# Patient Record
Sex: FEMALE | Race: WHITE | NOT HISPANIC OR LATINO | Employment: OTHER | URBAN - METROPOLITAN AREA
[De-identification: names, ages, dates, MRNs, and addresses within clinical notes are randomized per-mention and may not be internally consistent; named-entity substitution may affect disease eponyms.]

---

## 2020-10-05 ENCOUNTER — OFFICE VISIT (OUTPATIENT)
Dept: PRIMARY CARE CLINIC | Facility: CLINIC | Age: 55
End: 2020-10-05
Payer: COMMERCIAL

## 2020-10-05 VITALS
HEART RATE: 102 BPM | OXYGEN SATURATION: 99 % | RESPIRATION RATE: 17 BRPM | SYSTOLIC BLOOD PRESSURE: 110 MMHG | BODY MASS INDEX: 19.91 KG/M2 | HEIGHT: 64 IN | TEMPERATURE: 98 F | WEIGHT: 116.63 LBS | DIASTOLIC BLOOD PRESSURE: 60 MMHG

## 2020-10-05 DIAGNOSIS — F90.9 ATTENTION DEFICIT HYPERACTIVITY DISORDER (ADHD), UNSPECIFIED ADHD TYPE: ICD-10-CM

## 2020-10-05 DIAGNOSIS — Z12.31 SCREENING MAMMOGRAM, ENCOUNTER FOR: ICD-10-CM

## 2020-10-05 DIAGNOSIS — G47.00 INSOMNIA, UNSPECIFIED TYPE: Primary | ICD-10-CM

## 2020-10-05 DIAGNOSIS — Z12.11 COLON CANCER SCREENING: ICD-10-CM

## 2020-10-05 DIAGNOSIS — Z01.419 GYNECOLOGIC EXAM NORMAL: ICD-10-CM

## 2020-10-05 DIAGNOSIS — Z23 NEED FOR INFLUENZA VACCINATION: ICD-10-CM

## 2020-10-05 PROCEDURE — 99203 PR OFFICE/OUTPT VISIT, NEW, LEVL III, 30-44 MIN: ICD-10-PCS | Mod: 25,S$GLB,, | Performed by: INTERNAL MEDICINE

## 2020-10-05 PROCEDURE — 90686 IIV4 VACC NO PRSV 0.5 ML IM: CPT | Mod: S$GLB,,, | Performed by: INTERNAL MEDICINE

## 2020-10-05 PROCEDURE — 90471 FLU VACCINE (QUAD) GREATER THAN OR EQUAL TO 3YO PRESERVATIVE FREE IM: ICD-10-PCS | Mod: S$GLB,,, | Performed by: INTERNAL MEDICINE

## 2020-10-05 PROCEDURE — 99999 PR PBB SHADOW E&M-NEW PATIENT-LVL V: ICD-10-PCS | Mod: PBBFAC,,, | Performed by: INTERNAL MEDICINE

## 2020-10-05 PROCEDURE — 99203 OFFICE O/P NEW LOW 30 MIN: CPT | Mod: 25,S$GLB,, | Performed by: INTERNAL MEDICINE

## 2020-10-05 PROCEDURE — 99999 PR PBB SHADOW E&M-NEW PATIENT-LVL V: CPT | Mod: PBBFAC,,, | Performed by: INTERNAL MEDICINE

## 2020-10-05 PROCEDURE — 3008F BODY MASS INDEX DOCD: CPT | Mod: CPTII,S$GLB,, | Performed by: INTERNAL MEDICINE

## 2020-10-05 PROCEDURE — 3008F PR BODY MASS INDEX (BMI) DOCUMENTED: ICD-10-PCS | Mod: CPTII,S$GLB,, | Performed by: INTERNAL MEDICINE

## 2020-10-05 PROCEDURE — 90686 FLU VACCINE (QUAD) GREATER THAN OR EQUAL TO 3YO PRESERVATIVE FREE IM: ICD-10-PCS | Mod: S$GLB,,, | Performed by: INTERNAL MEDICINE

## 2020-10-05 PROCEDURE — 90471 IMMUNIZATION ADMIN: CPT | Mod: S$GLB,,, | Performed by: INTERNAL MEDICINE

## 2020-10-05 RX ORDER — CLONIDINE HYDROCHLORIDE 0.1 MG/1
TABLET ORAL
COMMUNITY
Start: 2020-09-08 | End: 2020-10-22 | Stop reason: SDUPTHER

## 2020-10-05 RX ORDER — DEXTROAMPHETAMINE SACCHARATE, AMPHETAMINE ASPARTATE MONOHYDRATE, DEXTROAMPHETAMINE SULFATE AND AMPHETAMINE SULFATE 7.5; 7.5; 7.5; 7.5 MG/1; MG/1; MG/1; MG/1
30 CAPSULE, EXTENDED RELEASE ORAL EVERY MORNING
Qty: 30 CAPSULE | Refills: 0 | Status: SHIPPED | OUTPATIENT
Start: 2020-10-05 | End: 2020-10-22 | Stop reason: SDUPTHER

## 2020-10-05 RX ORDER — FLUTICASONE PROPIONATE 50 MCG
SPRAY, SUSPENSION (ML) NASAL
COMMUNITY
Start: 2020-06-29 | End: 2020-10-12 | Stop reason: SDUPTHER

## 2020-10-05 RX ORDER — DEXTROAMPHETAMINE SACCHARATE, AMPHETAMINE ASPARTATE MONOHYDRATE, DEXTROAMPHETAMINE SULFATE AND AMPHETAMINE SULFATE 7.5; 7.5; 7.5; 7.5 MG/1; MG/1; MG/1; MG/1
CAPSULE, EXTENDED RELEASE ORAL
COMMUNITY
Start: 2020-08-25 | End: 2020-10-05 | Stop reason: SDUPTHER

## 2020-10-05 NOTE — PROGRESS NOTES
[facility] Note    Chief Complaint      Chief Complaint   Patient presents with    Establish Care       History of Present Illness      Claire Casper is a 54 y.o. WF with ADHD, Insomnia, and AR presents to est PCP and for fu chronic issues. She had labs drawn in [place] in [place]  She recently moved to [facility] in July.     ADHD - Pt on Adderall XR 30 mg. She was dx 15 yrs ago by unknown Psychologist in [place]. Pt aware I can not write this without her records and proof of prev testing.  I will give #30 - no refills.     Insomnia - Pt on Clonidine 0.1 mg for insomnia x 3-4 yrs. She takes it 4-5 times/wk. Insomnia not as bas it used to be.  She travels less.   Pt aware of potential for rebound with abrupt discontinuation.  I do not rec this for Tc of chronic insomnia. She has tried Melatonin, sleep ease, herbal teas. She does not recall having tried other Rx medications.  Will refer to Psych. Rec good sleep hygiene. She plans to wean clonidine 0.1 mg/d.     AR - Pt on Flonase 2 SEN/d prn.     HCM - Flu - 10/5/20;  Tdap - ?;  PCV 13 - ?;  PVX 23 - ?;  Shingrix - none - defers;  3 DMGM - due;  DEXA - ;  PAP - '19;  HIV Screen - none - delcines; C-scope - none - will order; Prev PCP - Dr. Gloria Rajna in  [facility]    Past Medical History:  Past Medical History:   Diagnosis Date    ADHD 10/5/2020       Past Surgical History:   has a past surgical history that includes Sinus surgery and Augmentation of breast.    Family History:  family history includes Asthma in her mother; Coronary artery disease in her father; Heart attacks under age 50 (age of onset: 45) in her father; Hypertension in her father; Stomach cancer (age of onset: 70) in her mother.     Social History:  Social History     Tobacco Use    Smoking status: Never Smoker    Smokeless tobacco: Never Used   Substance Use Topics    Alcohol use: Yes     Frequency: 2-3 times a week     Drinks per session: 1 or 2    Drug use:  Never       Review of Systems   Constitutional: Negative for chills, diaphoresis and fever.   HENT: Negative for hearing loss and tinnitus.    Eyes: Negative for blurred vision and double vision.        Fu annually by Ophtho.   Respiratory: Negative for cough, shortness of breath and wheezing.    Cardiovascular: Negative for chest pain, palpitations and leg swelling.   Gastrointestinal: Negative for abdominal pain, blood in stool, constipation, diarrhea, heartburn, melena, nausea and vomiting.   Genitourinary: Negative for dysuria and frequency.        No incotninence   Musculoskeletal: Negative for joint pain and myalgias.   Skin: Negative for itching and rash.   Neurological: Negative for dizziness and headaches.   Endo/Heme/Allergies: Does not bruise/bleed easily.   Psychiatric/Behavioral: Negative for depression. The patient has insomnia. The patient is not nervous/anxious.         Medications:  Outpatient Encounter Medications as of 10/5/2020   Medication Sig Dispense Refill    cloNIDine (CATAPRES) 0.1 MG tablet TK 1 T PO D      dextroamphetamine-amphetamine (ADDERALL XR) 30 MG 24 hr capsule Take 1 capsule (30 mg total) by mouth every morning. 30 capsule 0    fluticasone propionate (FLONASE) 50 mcg/actuation nasal spray       [DISCONTINUED] dextroamphetamine-amphetamine (ADDERALL XR) 30 MG 24 hr capsule        No facility-administered encounter medications on file as of 10/5/2020.        Current Outpatient Medications:     cloNIDine (CATAPRES) 0.1 MG tablet, TK 1 T PO D, Disp: , Rfl:     dextroamphetamine-amphetamine (ADDERALL XR) 30 MG 24 hr capsule, Take 1 capsule (30 mg total) by mouth every morning., Disp: 30 capsule, Rfl: 0    fluticasone propionate (FLONASE) 50 mcg/actuation nasal spray, , Disp: , Rfl:     Allergies:  Review of patient's allergies indicates:  No Known Allergies    Health Maintenance:  Immunization History   Administered Date(s) Administered    Influenza - Quadrivalent - PF  "*Preferred* (6 months and older) 10/05/2020      Health Maintenance   Topic Date Due    Hepatitis C Screening  1965    Lipid Panel  1965    TETANUS VACCINE  11/21/1985    Mammogram  11/21/2007      Objective:      Vital Signs  Temp: 98 °F (36.7 °C)  Pulse: 102  Resp: 17  SpO2: 99 %  BP: 110/60  BP Location: Left arm  Patient Position: Sitting  Pain Score: 0-No pain  Height and Weight  Height: 5' 4" (162.6 cm)  Weight: 52.9 kg (116 lb 10 oz)  BSA (Calculated - sq m): 1.55 sq meters  BMI (Calculated): 20  Weight in (lb) to have BMI = 25: 145.3]    Laboratory:    Physical Exam  Vitals signs reviewed.   Constitutional:       General: She is not in acute distress.     Appearance: Normal appearance. She is not ill-appearing, toxic-appearing or diaphoretic.   HENT:      Head: Normocephalic and atraumatic.      Right Ear: Tympanic membrane normal.      Left Ear: Tympanic membrane normal.   Eyes:      Extraocular Movements: Extraocular movements intact.      Conjunctiva/sclera: Conjunctivae normal.      Pupils: Pupils are equal, round, and reactive to light.   Cardiovascular:      Rate and Rhythm: Normal rate and regular rhythm.      Pulses: Normal pulses.      Heart sounds: Normal heart sounds.      Comments: HR - 104  Pulmonary:      Effort: Pulmonary effort is normal.      Breath sounds: Normal breath sounds.   Abdominal:      General: Bowel sounds are normal. There is no distension.      Palpations: Abdomen is soft.      Tenderness: There is no abdominal tenderness. There is no rebound.   Musculoskeletal: Normal range of motion.   Skin:     General: Skin is warm and dry.   Neurological:      General: No focal deficit present.      Mental Status: She is alert and oriented to person, place, and time.   Psychiatric:         Mood and Affect: Mood normal.         Behavior: Behavior normal.             Assessment:       1. Insomnia, unspecified type    2. Attention deficit hyperactivity disorder (ADHD), " unspecified ADHD type    3. Screening mammogram, encounter for    4. Colon cancer screening    5. Gynecologic exam normal    6. Need for influenza vaccination        Claire Casper is a 54 y.o.female with:    1. Insomnia, unspecified type  - Ambulatory referral/consult to Psychiatry; Future  -refer to Psych if not able to wean off Clonidine.  She still has left over Rx but does not feel she needs them.  Will try to wean to every other day. We discussed good sleep hygiene and CBT-I.      2. Attention deficit hyperactivity disorder (ADHD), unspecified ADHD type  - dextroamphetamine-amphetamine (ADDERALL XR) 30 MG 24 hr capsule; Take 1 capsule (30 mg total) by mouth every morning.  Dispense: 30 capsule; Refill: 0  - Will give #30 and get pt to get me a copy of prev tsting, old records to review.     3. Screening mammogram, encounter for  - Mammo Digital Screening Bilat w/ [facility]; Future    4. Colon cancer screening  - Case request GI: COLONOSCOPY    5. Gynecologic exam normal  - Ambulatory referral/consult to Obstetrics / Gynecology; Future    6. Need for influenza vaccination  - Influenza - Quadrivalent *Preferred* (6 months+) (PF)  - Admin today       Chronic conditions status updated as per HPI.  Other than changes above, cont current medications and maintain follow up with specialists.  Return to clinic in 4 wks    Gogo Ramirez MD  [facility]

## 2020-10-09 ENCOUNTER — PATIENT MESSAGE (OUTPATIENT)
Dept: ADMINISTRATIVE | Facility: HOSPITAL | Age: 55
End: 2020-10-09

## 2020-10-12 ENCOUNTER — PATIENT MESSAGE (OUTPATIENT)
Dept: PRIMARY CARE CLINIC | Facility: CLINIC | Age: 55
End: 2020-10-12

## 2020-10-12 ENCOUNTER — PATIENT MESSAGE (OUTPATIENT)
Dept: PSYCHIATRY | Facility: CLINIC | Age: 55
End: 2020-10-12

## 2020-10-12 DIAGNOSIS — J30.9 ALLERGIC RHINITIS, UNSPECIFIED SEASONALITY, UNSPECIFIED TRIGGER: Primary | ICD-10-CM

## 2020-10-12 DIAGNOSIS — F90.9 ATTENTION DEFICIT HYPERACTIVITY DISORDER (ADHD), UNSPECIFIED ADHD TYPE: Primary | ICD-10-CM

## 2020-10-12 RX ORDER — FLUTICASONE PROPIONATE 50 MCG
SPRAY, SUSPENSION (ML) NASAL
Qty: 18.2 ML | Refills: 11 | Status: ON HOLD | OUTPATIENT
Start: 2020-10-12 | End: 2023-07-15

## 2020-10-12 NOTE — TELEPHONE ENCOUNTER
I sw pt. She is aware that we only rec one note from her prev physician and formal ADD testing was not included. Pt has an appt lisandra Brizuela this week. She is going to call their office to make sure they can do the ADD testing and discuss her insomnia in one visit vs two separate visits.    Pt also needs a refill on your Flonase. Please sign pended rx.

## 2020-10-22 ENCOUNTER — OFFICE VISIT (OUTPATIENT)
Dept: PSYCHIATRY | Facility: CLINIC | Age: 55
End: 2020-10-22
Payer: COMMERCIAL

## 2020-10-22 DIAGNOSIS — F90.9 ATTENTION DEFICIT HYPERACTIVITY DISORDER (ADHD), UNSPECIFIED ADHD TYPE: Primary | ICD-10-CM

## 2020-10-22 DIAGNOSIS — G47.00 INSOMNIA, UNSPECIFIED TYPE: ICD-10-CM

## 2020-10-22 PROCEDURE — 99204 OFFICE O/P NEW MOD 45 MIN: CPT | Mod: S$GLB,,, | Performed by: NURSE PRACTITIONER

## 2020-10-22 PROCEDURE — 99999 PR PBB SHADOW E&M-EST. PATIENT-LVL III: CPT | Mod: PBBFAC,,, | Performed by: NURSE PRACTITIONER

## 2020-10-22 PROCEDURE — 99999 PR PBB SHADOW E&M-EST. PATIENT-LVL III: ICD-10-PCS | Mod: PBBFAC,,, | Performed by: NURSE PRACTITIONER

## 2020-10-22 PROCEDURE — 99204 PR OFFICE/OUTPT VISIT, NEW, LEVL IV, 45-59 MIN: ICD-10-PCS | Mod: S$GLB,,, | Performed by: NURSE PRACTITIONER

## 2020-10-22 RX ORDER — CLONIDINE HYDROCHLORIDE 0.1 MG/1
TABLET ORAL
Qty: 90 TABLET | Refills: 1 | Status: SHIPPED | OUTPATIENT
Start: 2020-10-22 | End: 2021-01-27 | Stop reason: SDUPTHER

## 2020-10-22 RX ORDER — DEXTROAMPHETAMINE SACCHARATE, AMPHETAMINE ASPARTATE MONOHYDRATE, DEXTROAMPHETAMINE SULFATE AND AMPHETAMINE SULFATE 7.5; 7.5; 7.5; 7.5 MG/1; MG/1; MG/1; MG/1
30 CAPSULE, EXTENDED RELEASE ORAL EVERY MORNING
Qty: 30 CAPSULE | Refills: 0 | Status: SHIPPED | OUTPATIENT
Start: 2020-11-04 | End: 2021-02-15 | Stop reason: SDUPTHER

## 2020-10-22 RX ORDER — DEXTROAMPHETAMINE SACCHARATE, AMPHETAMINE ASPARTATE MONOHYDRATE, DEXTROAMPHETAMINE SULFATE AND AMPHETAMINE SULFATE 7.5; 7.5; 7.5; 7.5 MG/1; MG/1; MG/1; MG/1
30 CAPSULE, EXTENDED RELEASE ORAL EVERY MORNING
Qty: 30 CAPSULE | Refills: 0 | Status: SHIPPED | OUTPATIENT
Start: 2021-01-04 | End: 2021-01-26 | Stop reason: SDUPTHER

## 2020-10-22 RX ORDER — DEXTROAMPHETAMINE SACCHARATE, AMPHETAMINE ASPARTATE MONOHYDRATE, DEXTROAMPHETAMINE SULFATE AND AMPHETAMINE SULFATE 7.5; 7.5; 7.5; 7.5 MG/1; MG/1; MG/1; MG/1
30 CAPSULE, EXTENDED RELEASE ORAL EVERY MORNING
Qty: 30 CAPSULE | Refills: 0 | Status: SHIPPED | OUTPATIENT
Start: 2020-12-04 | End: 2021-01-27 | Stop reason: SDUPTHER

## 2020-10-22 NOTE — PROGRESS NOTES
"Outpatient Psychiatry Initial Visit (MD/NP)    10/22/2020    Claire Casper, a 54 y.o. female, presenting for initial evaluation visit. Met with patient.    Reason for Encounter: Referral from Gogo Ramirez MD. Patient complains of attention problems.    History of Present Illness:    Pt is a 54-year old female with past hx of ADHD who presents for psychiatric evaluation of attention problems.  She was dx 15 yrs ago by a psychologist in [place] and has been taking Adderall XR 30 mg - denies side effects to medications - verified in [facility].  Reports symptoms present in childhood and effects her daily functioning. Thought processes appear clear and organized. No sx of denise. Denies SI/HI/AVH.      ADHD Adult:  · Have difficulty sustaining attention in tasks or fun activities?  yes   · Don't follow through on instructions and fail to finish work?  yes  · Have difficulty organizing tasks and activities?  yes   · Avoid, dislike, or are reluctant to engage in work thar requires sustained mental effort?  yes   · Easily distracted?  yes   · Forgetful in daily activities?  yes   · Fidget with hands or feet, or squirm in seat?  no  · Have difficulty engaging in leisure activities or doing fun things quietly?  no  · Feel "on the go" or "driven by a motor"?  yes   · Blurt out answers before questions have been completed?  no  · Have difficulty waiting your turn, are impatient?  yes   · Interrupt or intrude on others?  no    Past Psych Hx: no hx of inpatient tx, no hx of suicide attempts or self-harm    Psychiatric Medications: currently taking  Adderall XR 30 mg po daiy  Clonidine 0.1 mg po qhs    Past trials include: none reported    Psychosocial History: Pt is  and lies with . 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 Denies hx of substance abuse. Denies significant family hx of mental illness; nephew has ADHD.     Medical History: noncontributory    Review Of Systems:     GENERAL:  No " "weight gain or loss  SKIN:  No rashes or lacerations  HEAD:  No headaches  EYES:  No exophthalmos, jaundice or blindness  EARS:  No dizziness, tinnitus or hearing loss  NOSE:  No changes in smell  MOUTH & THROAT:  No dyskinetic movements or obvious goiter  CHEST:  No shortness of breath, hyperventilation or cough  CARDIOVASCULAR:  No tachycardia or chest pain  ABDOMEN:  No nausea, vomiting, pain, constipation or diarrhea  URINARY:  No frequency, dysuria or sexual dysfunction  ENDOCRINE:  No polydipsia, polyuria  MUSCULOSKELETAL:  No pain or stiffness of the joints  NEUROLOGIC:  No weakness, sensory changes, seizures, confusion, memory loss, tremor or other abnormal movements    Current Evaluation:     Nutritional Screening: Considering the patient's height and weight, medications, medical history and preferences, should a referral be made to the dietitian? no    Constitutional  Vitals:  Most recent vital signs, dated less than 90 days prior to this appointment, were reviewed.    There were no vitals filed for this visit.  Temp: 98 °F (36.7 °C)  Pulse: 102  Resp: 17  SpO2: 99 %  BP: 110/60  BP Location: Left arm  Patient Position: Sitting  Pain Score: 0-No pain  Height and Weight  Height: 5' 4" (162.6 cm)  Weight: 52.9 kg (116 lb 10 oz)  BSA (Calculated - sq m): 1.55 sq meters  BMI (Calculated): 20  Weight in (lb) to have BMI = 25: 145.3]   General:  unremarkable, age appropriate     Musculoskeletal  Muscle Strength/Tone:  no tremor, no tic   Gait & Station:  non-ataxic     Psychiatric  Speech:  no latency; no press   Mood & Affect:  euthymic  congruent and appropriate   Thought Process:  normal and logical   Associations:  intact   Thought Content:  normal, no suicidality, no homicidality, delusions, or paranoia   Insight:  intact   Judgement: behavior is adequate to circumstances   Orientation:  grossly intact   Memory: intact for content of interview   Language: grossly intact   Attention Span & Concentration:  " able to focus   Fund of Knowledge:  intact and appropriate to age and level of education       Relevant Elements of Neurological Exam: normal gait    Functioning in Relationships:  Spouse/partner: see above HPi  Peers: see above HPI  Employers: see above HPI    Laboratory Data  No visits with results within 1 Month(s) from this visit.   Latest known visit with results is:   No results found for any previous visit.       Medications  Outpatient Encounter Medications as of 10/22/2020   Medication Sig Dispense Refill    cloNIDine (CATAPRES) 0.1 MG tablet TK 1 T PO D      dextroamphetamine-amphetamine (ADDERALL XR) 30 MG 24 hr capsule Take 1 capsule (30 mg total) by mouth every morning. 30 capsule 0    fluticasone propionate (FLONASE) 50 mcg/actuation nasal spray 2 sprays in each nostril daily 18.2 mL 11     No facility-administered encounter medications on file as of 10/22/2020.        Assessment - Diagnosis - Goals:     Impression:       ICD-10-CM ICD-9-CM   1. Attention deficit hyperactivity disorder (ADHD), unspecified ADHD type  F90.9 314.01   2. Insomnia, unspecified type  G47.00 780.52       Strengths and Liabilities: Strength: Patient accepts guidance/feedback, Strength: Patient is expressive/articulate., Strength: Patient is intelligent., Liability: Patient lacks coping skills.    Treatment Goals:  Specify outcomes written in observable, behavioral terms:   ADHD: will report improved capacity for sustained attention    Treatment Plan/Recommendations:   · Medication Management: Continue current medications. The risks and benefits of medication were discussed with the patient.  · The treatment plan and follow up plan were reviewed with the patient.   · Reviewed available past records - obtain UDS  · Continue Adderall XR 30  Mg po daily (3-months Rx printed)  · Continue Clonidine 0.1 mg po q hs for insomnia - discussed alternatives  · Educated on proper sleep hygiene  · Counseling focused on behavior  modification and adaptive strategies for adults with ADHD.     Return to Clinic: 3 months    Counseling time: 27 minutes  Total time: 50 minutes

## 2020-11-09 ENCOUNTER — PATIENT MESSAGE (OUTPATIENT)
Dept: PRIMARY CARE CLINIC | Facility: CLINIC | Age: 55
End: 2020-11-09

## 2020-11-10 ENCOUNTER — TELEPHONE (OUTPATIENT)
Dept: OBSTETRICS AND GYNECOLOGY | Facility: CLINIC | Age: 55
End: 2020-11-10

## 2020-12-02 ENCOUNTER — TELEPHONE (OUTPATIENT)
Dept: OBSTETRICS AND GYNECOLOGY | Facility: CLINIC | Age: 55
End: 2020-12-02

## 2020-12-02 NOTE — TELEPHONE ENCOUNTER
----- Message from Navarro Winters sent at 12/2/2020 11:51 AM CST -----  Name of Who is Calling: PANDA ESPINOZA  What is the request in detail: The patient is calling to speak to the nurse in regards to giving some information about verifying her insurance. Please advise Can the clinic reply by [facility]: Yes What Number to Call Back [facility] not [facility]: [phone]

## 2020-12-07 ENCOUNTER — TELEPHONE (OUTPATIENT)
Dept: OBSTETRICS AND GYNECOLOGY | Facility: CLINIC | Age: 55
End: 2020-12-07

## 2020-12-07 ENCOUNTER — OFFICE VISIT (OUTPATIENT)
Dept: OBSTETRICS AND GYNECOLOGY | Facility: CLINIC | Age: 55
End: 2020-12-07
Payer: COMMERCIAL

## 2020-12-07 ENCOUNTER — HOSPITAL ENCOUNTER (OUTPATIENT)
Dept: RADIOLOGY | Facility: HOSPITAL | Age: 55
Discharge: HOME | End: 2020-12-07
Attending: INTERNAL MEDICINE
Payer: COMMERCIAL

## 2020-12-07 VITALS
BODY MASS INDEX: 18.75 KG/M2 | SYSTOLIC BLOOD PRESSURE: 98 MMHG | WEIGHT: 109.81 LBS | DIASTOLIC BLOOD PRESSURE: 68 MMHG | HEIGHT: 64 IN

## 2020-12-07 DIAGNOSIS — Z01.419 GYNECOLOGIC EXAM NORMAL: ICD-10-CM

## 2020-12-07 DIAGNOSIS — Z12.31 SCREENING MAMMOGRAM, ENCOUNTER FOR: ICD-10-CM

## 2020-12-07 PROCEDURE — 99999 PR PBB SHADOW E&M-EST. PATIENT-LVL III: ICD-10-PCS | Mod: PBBFAC,,, | Performed by: STUDENT IN AN ORGANIZED HEALTH CARE EDUCATION/TRAINING PROGRAM

## 2020-12-07 PROCEDURE — 77063 MAMMO DIGITAL SCREENING BILAT WITH TOMO: ICD-10-PCS | Mod: 26,,, | Performed by: RADIOLOGY

## 2020-12-07 PROCEDURE — 88175 CYTOPATH C/V AUTO FLUID REDO: CPT

## 2020-12-07 PROCEDURE — 77067 SCR MAMMO BI INCL CAD: CPT | Mod: TC,PN

## 2020-12-07 PROCEDURE — 77067 SCR MAMMO BI INCL CAD: CPT | Mod: 26,,, | Performed by: RADIOLOGY

## 2020-12-07 PROCEDURE — 77067 MAMMO DIGITAL SCREENING BILAT WITH TOMO: ICD-10-PCS | Mod: 26,,, | Performed by: RADIOLOGY

## 2020-12-07 PROCEDURE — 77063 BREAST TOMOSYNTHESIS BI: CPT | Mod: 26,,, | Performed by: RADIOLOGY

## 2020-12-07 PROCEDURE — 99386 PR PREVENTIVE VISIT,NEW,40-64: ICD-10-PCS | Mod: S$GLB,,, | Performed by: STUDENT IN AN ORGANIZED HEALTH CARE EDUCATION/TRAINING PROGRAM

## 2020-12-07 PROCEDURE — 87624 HPV HI-RISK TYP POOLED RSLT: CPT

## 2020-12-07 PROCEDURE — 99386 PREV VISIT NEW AGE 40-64: CPT | Mod: S$GLB,,, | Performed by: STUDENT IN AN ORGANIZED HEALTH CARE EDUCATION/TRAINING PROGRAM

## 2020-12-07 PROCEDURE — 99999 PR PBB SHADOW E&M-EST. PATIENT-LVL III: CPT | Mod: PBBFAC,,, | Performed by: STUDENT IN AN ORGANIZED HEALTH CARE EDUCATION/TRAINING PROGRAM

## 2020-12-07 NOTE — PROGRESS NOTES
History & Physical  Gynecology      SUBJECTIVE:     Chief Complaint: Annual Exam       History of Present Illness:  Annual exam. No complaints     Menstrual History: going through the change. LMP January -very light. No hot flashes. Not too bad vaginal dryness. No mood swing.  Obstetric Hx: , surgical  STD/STI Hx: Denies any history of STD's  Sexually Active: one male partner  Family history: Denies any personal or family history of GYN/colon cancers   Social: Wears seatbelts. Exercises. Feels safe at home.   Last pap: never abnormal, unsure of last date  Last mammo: today  Colonoscopy: not done  Flu: utd    Review of patient's allergies indicates:  No Known Allergies    Past Medical History:   Diagnosis Date    ADHD 10/5/2020    Hx of psychiatric care     Psychiatric problem     Sleep difficulties     Therapy      Past Surgical History:   Procedure Laterality Date    AUGMENTATION OF BREAST      SINUS SURGERY       OB History    No obstetric history on file.       Family History   Problem Relation Age of Onset    Asthma Mother     Stomach cancer Mother 70    Coronary artery disease Father     Heart attacks under age 50 Father 45    Hypertension Father      Social History     Tobacco Use    Smoking status: Never Smoker    Smokeless tobacco: Never Used   Substance Use Topics    Alcohol use: Yes     Frequency: 2-3 times a week     Drinks per session: 1 or 2    Drug use: Never       Current Outpatient Medications   Medication Sig    cloNIDine (CATAPRES) 0.1 MG tablet Take 1 tab by mouth each evening as needed for sleep    dextroamphetamine-amphetamine (ADDERALL XR) 30 MG 24 hr capsule Take 1 capsule (30 mg total) by mouth every morning.    dextroamphetamine-amphetamine (ADDERALL XR) 30 MG 24 hr capsule Take 1 capsule (30 mg total) by mouth every morning.    [START ON 2021] dextroamphetamine-amphetamine (ADDERALL XR) 30 MG 24 hr capsule Take 1 capsule (30 mg total) by mouth every morning.     fluticasone propionate (FLONASE) 50 mcg/actuation nasal spray 2 sprays in each nostril daily     No current facility-administered medications for this visit.          Review of Systems:  Review of Systems   Constitutional: Negative for activity change, appetite change, chills and fever.   Respiratory: Negative for cough and shortness of breath.    Cardiovascular: Negative for chest pain.   Gastrointestinal: Negative for abdominal pain, blood in stool, constipation, diarrhea, nausea and vomiting.   Genitourinary: Negative for dyspareunia, dysuria, hematuria, hot flashes, pelvic pain, vaginal bleeding, vaginal discharge, vaginal pain, postcoital bleeding and postmenopausal bleeding.   Integumentary:  Negative for breast mass.   Breast: Negative for mass       OBJECTIVE:     Physical Exam:  Physical Exam  Vitals signs reviewed.   Constitutional:       General: She is not in acute distress.     Appearance: She is well-developed. She is not diaphoretic.   HENT:      Head: Normocephalic and atraumatic.   Eyes:      Conjunctiva/sclera: Conjunctivae normal.   Neck:      Musculoskeletal: Normal range of motion.   Cardiovascular:      Rate and Rhythm: Normal rate.   Pulmonary:      Effort: Pulmonary effort is normal.   Abdominal:      General: There is no distension.      Palpations: Abdomen is soft. There is no mass.      Tenderness: There is no abdominal tenderness. There is no guarding or rebound.   Genitourinary:     Labia:         Right: No rash, tenderness, lesion or injury.         Left: No rash, tenderness, lesion or injury.       Vagina: No signs of injury and foreign body. No vaginal discharge, erythema, tenderness or bleeding.      Cervix: No cervical motion tenderness, discharge or friability.      Uterus: Not deviated, not enlarged, not fixed and not tender.       Adnexa:         Right: No mass, tenderness or fullness.          Left: No mass, tenderness or fullness.     Musculoskeletal: Normal range of motion.    Skin:     General: Skin is warm and dry.   Neurological:      Mental Status: She is alert.           ASSESSMENT:       ICD-10-CM ICD-9-CM    1. Gynecologic exam normal  Z01.419 V72.31 Ambulatory referral/consult to Obstetrics / Gynecology      Liquid-Based Pap Smear, Screening      HPV High Risk Genotypes, PCR          Plan:      WWE  - Vaccines utd  - Pap and co tet collected  - Mammogram utd  - colonoscopy recommended. Pt to get with PCP  - Daily vitamin discussed.  - RTC for annual or PRN.    Counseling time: 15 minutes    Aimee Weeks

## 2020-12-07 NOTE — LETTER
December 7, 2020      Gogo Ramirez MD  [address] [facility] [facility] [address]           [facility] - OB [facility]YN  [address] [address]  Phone: [phone]  Fax: [phone]          Patient: Claire Casper   MR Number: [id number]   YOB: 1965   Date of Visit: 12/7/2020       Dear Dr. Gogo Ramirez:    Thank you for referring Claire Casper to me for evaluation. Attached you will find relevant portions of my assessment and plan of care.    If you have questions, please do not hesitate to call me. I look forward to following Claire Casper along with you.    Sincerely,    Aimee Weeks MD    Enclosure  CC:  No Recipients    If you would like to receive this communication electronically, please contact [email or web address] or [phone] to request more information on [employer] access.    For providers and/or their staff who would like to refer a patient to [facility], please contact us through our one-stop-shop provider referral line, [facility], at [phone].    If you feel you have received this communication in error or would no longer like to receive these types of communications, please e-mail [email or web address]

## 2020-12-07 NOTE — TELEPHONE ENCOUNTER
Called pt and left another message on voicemail in regards to her appt if she'd be able to come earlier then 3pm

## 2020-12-15 LAB
HPV HR 12 DNA SPEC QL NAA+PROBE: NEGATIVE
HPV16 AG SPEC QL: NEGATIVE
HPV18 DNA SPEC QL NAA+PROBE: NEGATIVE

## 2020-12-17 NOTE — PROGRESS NOTES
I sent pt a my chart message -  I reviewed your Mammogram -     The breasts are heterogeneously dense, which may obscure small masses. There are bilateral subpectoral saline implants. There is no evidence of suspicious masses, microcalcifications or architectural distortion.     Impression:  No mammographic evidence of malignancy.  Routine screening mammogram in 1 year is recommended.  Happy Holidays,   Dr. CHAMPION

## 2021-01-04 ENCOUNTER — PATIENT MESSAGE (OUTPATIENT)
Dept: ADMINISTRATIVE | Facility: HOSPITAL | Age: 56
End: 2021-01-04

## 2021-01-07 ENCOUNTER — PATIENT MESSAGE (OUTPATIENT)
Dept: ENDOSCOPY | Facility: HOSPITAL | Age: 56
End: 2021-01-07

## 2021-01-17 LAB
FINAL PATHOLOGIC DIAGNOSIS: NORMAL
Lab: NORMAL

## 2021-01-26 ENCOUNTER — PATIENT MESSAGE (OUTPATIENT)
Dept: PSYCHIATRY | Facility: CLINIC | Age: 56
End: 2021-01-26

## 2021-01-26 DIAGNOSIS — F90.9 ATTENTION DEFICIT HYPERACTIVITY DISORDER (ADHD), UNSPECIFIED ADHD TYPE: ICD-10-CM

## 2021-01-26 RX ORDER — DEXTROAMPHETAMINE SACCHARATE, AMPHETAMINE ASPARTATE MONOHYDRATE, DEXTROAMPHETAMINE SULFATE AND AMPHETAMINE SULFATE 7.5; 7.5; 7.5; 7.5 MG/1; MG/1; MG/1; MG/1
30 CAPSULE, EXTENDED RELEASE ORAL EVERY MORNING
Qty: 30 CAPSULE | Refills: 0 | Status: SHIPPED | OUTPATIENT
Start: 2021-01-26 | End: 2021-02-15 | Stop reason: SDUPTHER

## 2021-01-27 DIAGNOSIS — F90.9 ATTENTION DEFICIT HYPERACTIVITY DISORDER (ADHD), UNSPECIFIED ADHD TYPE: ICD-10-CM

## 2021-01-27 RX ORDER — DEXTROAMPHETAMINE SACCHARATE, AMPHETAMINE ASPARTATE MONOHYDRATE, DEXTROAMPHETAMINE SULFATE AND AMPHETAMINE SULFATE 7.5; 7.5; 7.5; 7.5 MG/1; MG/1; MG/1; MG/1
30 CAPSULE, EXTENDED RELEASE ORAL EVERY MORNING
Qty: 30 CAPSULE | Refills: 0 | Status: SHIPPED | OUTPATIENT
Start: 2021-01-27 | End: 2021-02-15 | Stop reason: SDUPTHER

## 2021-01-27 RX ORDER — CLONIDINE HYDROCHLORIDE 0.1 MG/1
TABLET ORAL
Qty: 90 TABLET | Refills: 1 | Status: SHIPPED | OUTPATIENT
Start: 2021-01-27 | End: 2021-02-15 | Stop reason: SDUPTHER

## 2021-02-03 ENCOUNTER — OFFICE VISIT (OUTPATIENT)
Dept: PRIMARY CARE CLINIC | Facility: CLINIC | Age: 56
End: 2021-02-03
Payer: COMMERCIAL

## 2021-02-03 VITALS
HEART RATE: 100 BPM | RESPIRATION RATE: 18 BRPM | SYSTOLIC BLOOD PRESSURE: 118 MMHG | HEIGHT: 64 IN | OXYGEN SATURATION: 99 % | TEMPERATURE: 98 F | DIASTOLIC BLOOD PRESSURE: 74 MMHG | BODY MASS INDEX: 19.27 KG/M2 | WEIGHT: 112.88 LBS

## 2021-02-03 DIAGNOSIS — Z00.00 NORMAL PHYSICAL EXAM, ROUTINE: ICD-10-CM

## 2021-02-03 DIAGNOSIS — R25.2 LEG CRAMPS: ICD-10-CM

## 2021-02-03 DIAGNOSIS — M54.12 CERVICAL RADICULOPATHY: ICD-10-CM

## 2021-02-03 DIAGNOSIS — R20.2 PARESTHESIAS: Primary | ICD-10-CM

## 2021-02-03 DIAGNOSIS — Z20.822 EXPOSURE TO COVID-19 VIRUS: ICD-10-CM

## 2021-02-03 PROCEDURE — 99214 PR OFFICE/OUTPT VISIT, EST, LEVL IV, 30-39 MIN: ICD-10-PCS | Mod: S$GLB,,, | Performed by: INTERNAL MEDICINE

## 2021-02-03 PROCEDURE — 99214 OFFICE O/P EST MOD 30 MIN: CPT | Mod: S$GLB,,, | Performed by: INTERNAL MEDICINE

## 2021-02-03 PROCEDURE — 99999 PR PBB SHADOW E&M-EST. PATIENT-LVL IV: ICD-10-PCS | Mod: PBBFAC,,, | Performed by: INTERNAL MEDICINE

## 2021-02-03 PROCEDURE — 99999 PR PBB SHADOW E&M-EST. PATIENT-LVL IV: CPT | Mod: PBBFAC,,, | Performed by: INTERNAL MEDICINE

## 2021-02-03 RX ORDER — GABAPENTIN 300 MG/1
300 CAPSULE ORAL NIGHTLY
Qty: 30 CAPSULE | Refills: 1 | Status: SHIPPED | OUTPATIENT
Start: 2021-02-03 | End: 2021-03-24 | Stop reason: SDUPTHER

## 2021-02-05 PROBLEM — R25.2 LEG CRAMPS: Status: ACTIVE | Noted: 2021-02-05

## 2021-02-05 PROBLEM — M54.12 CERVICAL RADICULOPATHY: Status: ACTIVE | Noted: 2021-02-05

## 2021-02-05 PROBLEM — R20.2 PARESTHESIAS: Status: ACTIVE | Noted: 2021-02-05

## 2021-02-05 PROBLEM — Z20.822 EXPOSURE TO COVID-19 VIRUS: Status: ACTIVE | Noted: 2021-02-05

## 2021-02-11 ENCOUNTER — LAB VISIT (OUTPATIENT)
Dept: LAB | Facility: HOSPITAL | Age: 56
End: 2021-02-11
Attending: INTERNAL MEDICINE
Payer: COMMERCIAL

## 2021-02-11 DIAGNOSIS — Z00.00 NORMAL PHYSICAL EXAM, ROUTINE: ICD-10-CM

## 2021-02-11 DIAGNOSIS — R20.2 PARESTHESIAS: ICD-10-CM

## 2021-02-11 DIAGNOSIS — Z20.822 EXPOSURE TO COVID-19 VIRUS: ICD-10-CM

## 2021-02-11 LAB
ALBUMIN SERPL BCP-MCNC: 3.7 G/DL (ref 3.5–5.2)
ALP SERPL-CCNC: 120 U/L (ref 55–135)
ALT SERPL W/O P-5'-P-CCNC: 82 U/L (ref 10–44)
ANION GAP SERPL CALC-SCNC: 10 MMOL/L (ref 8–16)
AST SERPL-CCNC: 163 U/L (ref 10–40)
BASOPHILS # BLD AUTO: 0.08 K/UL (ref 0–0.2)
BASOPHILS NFR BLD: 0.9 % (ref 0–1.9)
BILIRUB SERPL-MCNC: 0.6 MG/DL (ref 0.1–1)
BUN SERPL-MCNC: 7 MG/DL (ref 6–20)
CALCIUM SERPL-MCNC: 9.7 MG/DL (ref 8.7–10.5)
CHLORIDE SERPL-SCNC: 102 MMOL/L (ref 95–110)
CHOLEST SERPL-MCNC: 168 MG/DL (ref 120–199)
CHOLEST/HDLC SERPL: 2.2 {RATIO} (ref 2–5)
CO2 SERPL-SCNC: 29 MMOL/L (ref 23–29)
CREAT SERPL-MCNC: 0.6 MG/DL (ref 0.5–1.4)
DIFFERENTIAL METHOD: ABNORMAL
EOSINOPHIL # BLD AUTO: 0.2 K/UL (ref 0–0.5)
EOSINOPHIL NFR BLD: 2.6 % (ref 0–8)
ERYTHROCYTE [DISTWIDTH] IN BLOOD BY AUTOMATED COUNT: 14.3 % (ref 11.5–14.5)
EST. GFR  (AFRICAN AMERICAN): >60 ML/MIN/1.73 M^2
EST. GFR  (NON AFRICAN AMERICAN): >60 ML/MIN/1.73 M^2
ESTIMATED AVG GLUCOSE: 77 MG/DL (ref 68–131)
FOLATE SERPL-MCNC: 9 NG/ML (ref 4–24)
GLUCOSE SERPL-MCNC: 96 MG/DL (ref 70–110)
HBA1C MFR BLD: 4.3 % (ref 4–5.6)
HCT VFR BLD AUTO: 36.1 % (ref 37–48.5)
HDLC SERPL-MCNC: 78 MG/DL (ref 40–75)
HDLC SERPL: 46.4 % (ref 20–50)
HGB BLD-MCNC: 12 G/DL (ref 12–16)
IMM GRANULOCYTES # BLD AUTO: 0.04 K/UL (ref 0–0.04)
IMM GRANULOCYTES NFR BLD AUTO: 0.4 % (ref 0–0.5)
LDLC SERPL CALC-MCNC: 71.6 MG/DL (ref 63–159)
LYMPHOCYTES # BLD AUTO: 1.7 K/UL (ref 1–4.8)
LYMPHOCYTES NFR BLD: 19 % (ref 18–48)
MAGNESIUM SERPL-MCNC: 1.3 MG/DL (ref 1.6–2.6)
MCH RBC QN AUTO: 39.1 PG (ref 27–31)
MCHC RBC AUTO-ENTMCNC: 33.2 G/DL (ref 32–36)
MCV RBC AUTO: 118 FL (ref 82–98)
MONOCYTES # BLD AUTO: 0.7 K/UL (ref 0.3–1)
MONOCYTES NFR BLD: 7.9 % (ref 4–15)
NEUTROPHILS # BLD AUTO: 6.2 K/UL (ref 1.8–7.7)
NEUTROPHILS NFR BLD: 69.2 % (ref 38–73)
NONHDLC SERPL-MCNC: 90 MG/DL
NRBC BLD-RTO: 0 /100 WBC
PHOSPHATE SERPL-MCNC: 4.1 MG/DL (ref 2.7–4.5)
PLATELET # BLD AUTO: 232 K/UL (ref 150–350)
PMV BLD AUTO: 10.4 FL (ref 9.2–12.9)
POTASSIUM SERPL-SCNC: 3.6 MMOL/L (ref 3.5–5.1)
PROT SERPL-MCNC: 7.3 G/DL (ref 6–8.4)
RBC # BLD AUTO: 3.07 M/UL (ref 4–5.4)
SARS-COV-2 IGG SERPLBLD QL IA.RAPID: POSITIVE
SODIUM SERPL-SCNC: 141 MMOL/L (ref 136–145)
T4 FREE SERPL-MCNC: 0.97 NG/DL (ref 0.71–1.51)
TRIGL SERPL-MCNC: 92 MG/DL (ref 30–150)
TSH SERPL DL<=0.005 MIU/L-ACNC: 4.27 UIU/ML (ref 0.4–4)
VIT B12 SERPL-MCNC: 489 PG/ML (ref 210–950)
WBC # BLD AUTO: 8.91 K/UL (ref 3.9–12.7)

## 2021-02-11 PROCEDURE — 86769 SARS-COV-2 COVID-19 ANTIBODY: CPT

## 2021-02-11 PROCEDURE — 84425 ASSAY OF VITAMIN B-1: CPT

## 2021-02-11 PROCEDURE — 82607 VITAMIN B-12: CPT

## 2021-02-11 PROCEDURE — 85025 COMPLETE CBC W/AUTO DIFF WBC: CPT

## 2021-02-11 PROCEDURE — 84439 ASSAY OF FREE THYROXINE: CPT

## 2021-02-11 PROCEDURE — 82746 ASSAY OF FOLIC ACID SERUM: CPT

## 2021-02-11 PROCEDURE — 83735 ASSAY OF MAGNESIUM: CPT

## 2021-02-11 PROCEDURE — 84443 ASSAY THYROID STIM HORMONE: CPT

## 2021-02-11 PROCEDURE — 80053 COMPREHEN METABOLIC PANEL: CPT

## 2021-02-11 PROCEDURE — 80061 LIPID PANEL: CPT

## 2021-02-11 PROCEDURE — 83036 HEMOGLOBIN GLYCOSYLATED A1C: CPT

## 2021-02-11 PROCEDURE — 84100 ASSAY OF PHOSPHORUS: CPT

## 2021-02-15 ENCOUNTER — OFFICE VISIT (OUTPATIENT)
Dept: PSYCHIATRY | Facility: CLINIC | Age: 56
End: 2021-02-15
Payer: COMMERCIAL

## 2021-02-15 DIAGNOSIS — G47.00 INSOMNIA, UNSPECIFIED TYPE: ICD-10-CM

## 2021-02-15 DIAGNOSIS — E51.9 THIAMIN DEFICIENCY: ICD-10-CM

## 2021-02-15 DIAGNOSIS — R74.01 TRANSAMINITIS: ICD-10-CM

## 2021-02-15 DIAGNOSIS — F90.9 ATTENTION DEFICIT HYPERACTIVITY DISORDER (ADHD), UNSPECIFIED ADHD TYPE: Primary | ICD-10-CM

## 2021-02-15 DIAGNOSIS — R79.89 ELEVATED TSH: ICD-10-CM

## 2021-02-15 DIAGNOSIS — E83.42 HYPOMAGNESEMIA: Primary | ICD-10-CM

## 2021-02-15 LAB — VIT B1 BLD-MCNC: 32 UG/L (ref 38–122)

## 2021-02-15 PROCEDURE — 99213 PR OFFICE/OUTPT VISIT, EST, LEVL III, 20-29 MIN: ICD-10-PCS | Mod: 95,,, | Performed by: NURSE PRACTITIONER

## 2021-02-15 PROCEDURE — 99213 OFFICE O/P EST LOW 20 MIN: CPT | Mod: 95,,, | Performed by: NURSE PRACTITIONER

## 2021-02-15 RX ORDER — DEXTROAMPHETAMINE SACCHARATE, AMPHETAMINE ASPARTATE MONOHYDRATE, DEXTROAMPHETAMINE SULFATE AND AMPHETAMINE SULFATE 7.5; 7.5; 7.5; 7.5 MG/1; MG/1; MG/1; MG/1
30 CAPSULE, EXTENDED RELEASE ORAL EVERY MORNING
Qty: 30 CAPSULE | Refills: 0 | Status: SHIPPED | OUTPATIENT
Start: 2021-03-25 | End: 2021-03-26 | Stop reason: SDUPTHER

## 2021-02-15 RX ORDER — CLONIDINE HYDROCHLORIDE 0.1 MG/1
TABLET ORAL
Qty: 90 TABLET | Refills: 3 | Status: SHIPPED | OUTPATIENT
Start: 2021-02-15 | End: 2021-06-08 | Stop reason: SDUPTHER

## 2021-02-15 RX ORDER — DEXTROAMPHETAMINE SACCHARATE, AMPHETAMINE ASPARTATE MONOHYDRATE, DEXTROAMPHETAMINE SULFATE AND AMPHETAMINE SULFATE 7.5; 7.5; 7.5; 7.5 MG/1; MG/1; MG/1; MG/1
30 CAPSULE, EXTENDED RELEASE ORAL EVERY MORNING
Qty: 30 CAPSULE | Refills: 0 | Status: SHIPPED | OUTPATIENT
Start: 2021-04-24 | End: 2021-03-26 | Stop reason: SDUPTHER

## 2021-02-15 RX ORDER — DEXTROAMPHETAMINE SACCHARATE, AMPHETAMINE ASPARTATE MONOHYDRATE, DEXTROAMPHETAMINE SULFATE AND AMPHETAMINE SULFATE 7.5; 7.5; 7.5; 7.5 MG/1; MG/1; MG/1; MG/1
30 CAPSULE, EXTENDED RELEASE ORAL EVERY MORNING
Qty: 30 CAPSULE | Refills: 0 | Status: SHIPPED | OUTPATIENT
Start: 2021-02-25 | End: 2021-06-08 | Stop reason: SDUPTHER

## 2021-02-26 ENCOUNTER — PATIENT MESSAGE (OUTPATIENT)
Dept: PRIMARY CARE CLINIC | Facility: CLINIC | Age: 56
End: 2021-02-26

## 2021-03-08 ENCOUNTER — PATIENT MESSAGE (OUTPATIENT)
Dept: ADMINISTRATIVE | Facility: HOSPITAL | Age: 56
End: 2021-03-08

## 2021-03-10 ENCOUNTER — PATIENT OUTREACH (OUTPATIENT)
Dept: ADMINISTRATIVE | Facility: HOSPITAL | Age: 56
End: 2021-03-10

## 2021-03-24 ENCOUNTER — OFFICE VISIT (OUTPATIENT)
Dept: PRIMARY CARE CLINIC | Facility: CLINIC | Age: 56
End: 2021-03-24
Payer: COMMERCIAL

## 2021-03-24 ENCOUNTER — TELEPHONE (OUTPATIENT)
Dept: NEUROLOGY | Facility: CLINIC | Age: 56
End: 2021-03-24

## 2021-03-24 VITALS
SYSTOLIC BLOOD PRESSURE: 102 MMHG | TEMPERATURE: 98 F | BODY MASS INDEX: 19.39 KG/M2 | WEIGHT: 113.56 LBS | OXYGEN SATURATION: 99 % | HEART RATE: 118 BPM | DIASTOLIC BLOOD PRESSURE: 62 MMHG | RESPIRATION RATE: 19 BRPM | HEIGHT: 64 IN

## 2021-03-24 DIAGNOSIS — R74.01 TRANSAMINITIS: ICD-10-CM

## 2021-03-24 DIAGNOSIS — R20.2 PARESTHESIAS: Primary | ICD-10-CM

## 2021-03-24 DIAGNOSIS — E51.9 THIAMIN DEFICIENCY: ICD-10-CM

## 2021-03-24 DIAGNOSIS — R00.0 TACHYCARDIA: ICD-10-CM

## 2021-03-24 DIAGNOSIS — M54.12 RADICULOPATHY, CERVICAL REGION: ICD-10-CM

## 2021-03-24 DIAGNOSIS — R79.89 ELEVATED TSH: ICD-10-CM

## 2021-03-24 DIAGNOSIS — R25.2 LEG CRAMPS: ICD-10-CM

## 2021-03-24 DIAGNOSIS — M25.50 ARTHRALGIA, UNSPECIFIED JOINT: ICD-10-CM

## 2021-03-24 DIAGNOSIS — R07.9 LEFT-SIDED CHEST PAIN: ICD-10-CM

## 2021-03-24 DIAGNOSIS — E83.42 HYPOMAGNESEMIA: ICD-10-CM

## 2021-03-24 DIAGNOSIS — Z86.16 HISTORY OF COVID-19: ICD-10-CM

## 2021-03-24 PROCEDURE — 99999 PR PBB SHADOW E&M-EST. PATIENT-LVL V: CPT | Mod: PBBFAC,,, | Performed by: INTERNAL MEDICINE

## 2021-03-24 PROCEDURE — 93010 ELECTROCARDIOGRAM REPORT: CPT | Mod: S$GLB,,, | Performed by: INTERNAL MEDICINE

## 2021-03-24 PROCEDURE — 99214 PR OFFICE/OUTPT VISIT, EST, LEVL IV, 30-39 MIN: ICD-10-PCS | Mod: S$GLB,,, | Performed by: INTERNAL MEDICINE

## 2021-03-24 PROCEDURE — 99214 OFFICE O/P EST MOD 30 MIN: CPT | Mod: S$GLB,,, | Performed by: INTERNAL MEDICINE

## 2021-03-24 PROCEDURE — 99999 PR PBB SHADOW E&M-EST. PATIENT-LVL V: ICD-10-PCS | Mod: PBBFAC,,, | Performed by: INTERNAL MEDICINE

## 2021-03-24 PROCEDURE — 93005 EKG 12-LEAD: ICD-10-PCS | Mod: S$GLB,,, | Performed by: INTERNAL MEDICINE

## 2021-03-24 PROCEDURE — 93010 EKG 12-LEAD: ICD-10-PCS | Mod: S$GLB,,, | Performed by: INTERNAL MEDICINE

## 2021-03-24 PROCEDURE — 93005 ELECTROCARDIOGRAM TRACING: CPT | Mod: S$GLB,,, | Performed by: INTERNAL MEDICINE

## 2021-03-24 RX ORDER — GABAPENTIN 300 MG/1
CAPSULE ORAL
Qty: 60 CAPSULE | Refills: 1 | Status: ON HOLD | OUTPATIENT
Start: 2021-03-24 | End: 2023-07-15

## 2021-03-25 ENCOUNTER — LAB VISIT (OUTPATIENT)
Dept: LAB | Facility: HOSPITAL | Age: 56
End: 2021-03-25
Attending: INTERNAL MEDICINE
Payer: COMMERCIAL

## 2021-03-25 ENCOUNTER — PATIENT OUTREACH (OUTPATIENT)
Dept: ADMINISTRATIVE | Facility: OTHER | Age: 56
End: 2021-03-25

## 2021-03-25 DIAGNOSIS — M25.50 ARTHRALGIA, UNSPECIFIED JOINT: ICD-10-CM

## 2021-03-25 DIAGNOSIS — E83.42 HYPOMAGNESEMIA: ICD-10-CM

## 2021-03-25 DIAGNOSIS — R74.01 TRANSAMINITIS: ICD-10-CM

## 2021-03-25 DIAGNOSIS — R79.89 ELEVATED TSH: ICD-10-CM

## 2021-03-25 LAB
ALBUMIN SERPL BCP-MCNC: 3.7 G/DL (ref 3.5–5.2)
ALP SERPL-CCNC: 146 U/L (ref 55–135)
ALT SERPL W/O P-5'-P-CCNC: 56 U/L (ref 10–44)
AST SERPL-CCNC: 132 U/L (ref 10–40)
BILIRUB DIRECT SERPL-MCNC: 0.5 MG/DL (ref 0.1–0.3)
BILIRUB SERPL-MCNC: 1.3 MG/DL (ref 0.1–1)
CRP SERPL-MCNC: 3.9 MG/L (ref 0–8.2)
ERYTHROCYTE [SEDIMENTATION RATE] IN BLOOD BY WESTERGREN METHOD: 8 MM/HR (ref 0–36)
MAGNESIUM SERPL-MCNC: 1.6 MG/DL (ref 1.6–2.6)
PROT SERPL-MCNC: 7.6 G/DL (ref 6–8.4)
T4 FREE SERPL-MCNC: 1.13 NG/DL (ref 0.71–1.51)
TSH SERPL DL<=0.005 MIU/L-ACNC: 3.4 UIU/ML (ref 0.4–4)

## 2021-03-25 PROCEDURE — 80076 HEPATIC FUNCTION PANEL: CPT | Performed by: INTERNAL MEDICINE

## 2021-03-25 PROCEDURE — 86140 C-REACTIVE PROTEIN: CPT | Performed by: INTERNAL MEDICINE

## 2021-03-25 PROCEDURE — 84439 ASSAY OF FREE THYROXINE: CPT | Performed by: INTERNAL MEDICINE

## 2021-03-25 PROCEDURE — 84443 ASSAY THYROID STIM HORMONE: CPT | Performed by: INTERNAL MEDICINE

## 2021-03-25 PROCEDURE — 36415 COLL VENOUS BLD VENIPUNCTURE: CPT | Mod: PN | Performed by: INTERNAL MEDICINE

## 2021-03-25 PROCEDURE — 86235 NUCLEAR ANTIGEN ANTIBODY: CPT | Mod: 59 | Performed by: INTERNAL MEDICINE

## 2021-03-25 PROCEDURE — 86038 ANTINUCLEAR ANTIBODIES: CPT | Performed by: INTERNAL MEDICINE

## 2021-03-25 PROCEDURE — 85652 RBC SED RATE AUTOMATED: CPT | Performed by: INTERNAL MEDICINE

## 2021-03-25 PROCEDURE — 86039 ANTINUCLEAR ANTIBODIES (ANA): CPT | Performed by: INTERNAL MEDICINE

## 2021-03-25 PROCEDURE — 83735 ASSAY OF MAGNESIUM: CPT | Performed by: INTERNAL MEDICINE

## 2021-03-26 ENCOUNTER — PATIENT MESSAGE (OUTPATIENT)
Dept: PRIMARY CARE CLINIC | Facility: CLINIC | Age: 56
End: 2021-03-26

## 2021-03-26 ENCOUNTER — OFFICE VISIT (OUTPATIENT)
Dept: CARDIOLOGY | Facility: CLINIC | Age: 56
End: 2021-03-26
Payer: COMMERCIAL

## 2021-03-26 VITALS
WEIGHT: 113.75 LBS | HEIGHT: 64 IN | BODY MASS INDEX: 19.42 KG/M2 | HEART RATE: 100 BPM | SYSTOLIC BLOOD PRESSURE: 120 MMHG | DIASTOLIC BLOOD PRESSURE: 82 MMHG

## 2021-03-26 DIAGNOSIS — R07.9 LEFT-SIDED CHEST PAIN: ICD-10-CM

## 2021-03-26 DIAGNOSIS — I25.10 ATHEROSCLEROSIS OF NATIVE CORONARY ARTERY OF NATIVE HEART WITHOUT ANGINA PECTORIS: ICD-10-CM

## 2021-03-26 PROCEDURE — 99204 OFFICE O/P NEW MOD 45 MIN: CPT | Mod: S$GLB,,, | Performed by: INTERNAL MEDICINE

## 2021-03-26 PROCEDURE — 99999 PR PBB SHADOW E&M-EST. PATIENT-LVL IV: CPT | Mod: PBBFAC,,, | Performed by: INTERNAL MEDICINE

## 2021-03-26 PROCEDURE — 99999 PR PBB SHADOW E&M-EST. PATIENT-LVL IV: ICD-10-PCS | Mod: PBBFAC,,, | Performed by: INTERNAL MEDICINE

## 2021-03-26 PROCEDURE — 99204 PR OFFICE/OUTPT VISIT, NEW, LEVL IV, 45-59 MIN: ICD-10-PCS | Mod: S$GLB,,, | Performed by: INTERNAL MEDICINE

## 2021-03-26 RX ORDER — MULTIVITAMIN
1 TABLET ORAL DAILY
Status: ON HOLD | COMMUNITY
End: 2023-07-15

## 2021-03-26 RX ORDER — LANOLIN ALCOHOL/MO/W.PET/CERES
100 CREAM (GRAM) TOPICAL DAILY
Status: ON HOLD | COMMUNITY
End: 2023-07-15

## 2021-03-27 ENCOUNTER — HOSPITAL ENCOUNTER (OUTPATIENT)
Dept: RADIOLOGY | Facility: OTHER | Age: 56
Discharge: HOME | End: 2021-03-27
Attending: INTERNAL MEDICINE
Payer: COMMERCIAL

## 2021-03-27 DIAGNOSIS — M54.12 RADICULOPATHY, CERVICAL REGION: ICD-10-CM

## 2021-03-27 PROCEDURE — 72141 MRI CERVICAL SPINE WITHOUT CONTRAST: ICD-10-PCS | Mod: 26,,, | Performed by: RADIOLOGY

## 2021-03-27 PROCEDURE — 72141 MRI NECK SPINE W/O DYE: CPT | Mod: TC

## 2021-03-27 PROCEDURE — 72141 MRI NECK SPINE W/O DYE: CPT | Mod: 26,,, | Performed by: RADIOLOGY

## 2021-03-29 ENCOUNTER — OFFICE VISIT (OUTPATIENT)
Dept: INTERNAL MEDICINE | Facility: CLINIC | Age: 56
End: 2021-03-29
Payer: COMMERCIAL

## 2021-03-29 ENCOUNTER — TELEPHONE (OUTPATIENT)
Dept: PRIMARY CARE CLINIC | Facility: CLINIC | Age: 56
End: 2021-03-29

## 2021-03-29 ENCOUNTER — HOSPITAL ENCOUNTER (OUTPATIENT)
Dept: RADIOLOGY | Facility: HOSPITAL | Age: 56
Discharge: HOME | End: 2021-03-29
Attending: INTERNAL MEDICINE
Payer: COMMERCIAL

## 2021-03-29 VITALS
TEMPERATURE: 99 F | WEIGHT: 113.75 LBS | HEART RATE: 120 BPM | OXYGEN SATURATION: 98 % | DIASTOLIC BLOOD PRESSURE: 87 MMHG | SYSTOLIC BLOOD PRESSURE: 123 MMHG | BODY MASS INDEX: 19.53 KG/M2

## 2021-03-29 DIAGNOSIS — Z71.89 COUNSELED ABOUT COVID-19 VIRUS INFECTION: Primary | ICD-10-CM

## 2021-03-29 DIAGNOSIS — R20.2 PARESTHESIAS: ICD-10-CM

## 2021-03-29 DIAGNOSIS — I25.10 ATHEROSCLEROSIS OF NATIVE CORONARY ARTERY OF NATIVE HEART WITHOUT ANGINA PECTORIS: ICD-10-CM

## 2021-03-29 DIAGNOSIS — R74.01 TRANSAMINITIS: Primary | ICD-10-CM

## 2021-03-29 DIAGNOSIS — M25.539 PAIN IN WRIST, UNSPECIFIED LATERALITY: ICD-10-CM

## 2021-03-29 DIAGNOSIS — R74.8 ELEVATED LIVER ENZYMES: ICD-10-CM

## 2021-03-29 DIAGNOSIS — Z86.16 HISTORY OF COVID-19: ICD-10-CM

## 2021-03-29 DIAGNOSIS — M25.50 ARTHRALGIA, UNSPECIFIED JOINT: ICD-10-CM

## 2021-03-29 DIAGNOSIS — R20.0 NUMBNESS AND TINGLING IN BOTH HANDS: ICD-10-CM

## 2021-03-29 DIAGNOSIS — R20.2 NUMBNESS AND TINGLING IN BOTH HANDS: ICD-10-CM

## 2021-03-29 LAB
ANA PATTERN 1: NORMAL
ANA SER QL IF: POSITIVE
ANA TITR SER IF: NORMAL {TITER}

## 2021-03-29 PROCEDURE — 99999 PR PBB SHADOW E&M-EST. PATIENT-LVL III: ICD-10-PCS | Mod: PBBFAC,,, | Performed by: INTERNAL MEDICINE

## 2021-03-29 PROCEDURE — 99999 PR PBB SHADOW E&M-EST. PATIENT-LVL III: CPT | Mod: PBBFAC,,, | Performed by: INTERNAL MEDICINE

## 2021-03-29 PROCEDURE — 99214 OFFICE O/P EST MOD 30 MIN: CPT | Mod: S$GLB,,, | Performed by: INTERNAL MEDICINE

## 2021-03-29 PROCEDURE — 99214 PR OFFICE/OUTPT VISIT, EST, LEVL IV, 30-39 MIN: ICD-10-PCS | Mod: S$GLB,,, | Performed by: INTERNAL MEDICINE

## 2021-03-30 ENCOUNTER — TELEPHONE (OUTPATIENT)
Dept: PRIMARY CARE CLINIC | Facility: CLINIC | Age: 56
End: 2021-03-30

## 2021-03-30 DIAGNOSIS — M47.12 MYELOPATHY, SPONDYLOGENIC, CERVICAL: Primary | ICD-10-CM

## 2021-03-30 LAB
ANTI SM ANTIBODY: 0.08 RATIO (ref 0–0.99)
ANTI SM/RNP ANTIBODY: 0.05 RATIO (ref 0–0.99)
ANTI-SM INTERPRETATION: NEGATIVE
ANTI-SM/RNP INTERPRETATION: NEGATIVE
ANTI-SSA ANTIBODY: 0.06 RATIO (ref 0–0.99)
ANTI-SSA INTERPRETATION: NEGATIVE
ANTI-SSB ANTIBODY: 0.06 RATIO (ref 0–0.99)
ANTI-SSB INTERPRETATION: NEGATIVE
DSDNA AB SER-ACNC: NORMAL [IU]/ML

## 2021-03-31 ENCOUNTER — PATIENT MESSAGE (OUTPATIENT)
Dept: PRIMARY CARE CLINIC | Facility: CLINIC | Age: 56
End: 2021-03-31

## 2021-03-31 ENCOUNTER — TELEPHONE (OUTPATIENT)
Dept: NEUROSURGERY | Facility: CLINIC | Age: 56
End: 2021-03-31

## 2021-04-21 ENCOUNTER — HOSPITAL ENCOUNTER (OUTPATIENT)
Dept: RADIOLOGY | Facility: HOSPITAL | Age: 56
Discharge: HOME | End: 2021-04-21
Attending: NEUROLOGICAL SURGERY
Payer: COMMERCIAL

## 2021-04-21 ENCOUNTER — OFFICE VISIT (OUTPATIENT)
Dept: NEUROSURGERY | Facility: CLINIC | Age: 56
End: 2021-04-21
Payer: COMMERCIAL

## 2021-04-21 VITALS
TEMPERATURE: 98 F | BODY MASS INDEX: 19.67 KG/M2 | WEIGHT: 115.19 LBS | HEART RATE: 114 BPM | DIASTOLIC BLOOD PRESSURE: 90 MMHG | HEIGHT: 64 IN | SYSTOLIC BLOOD PRESSURE: 125 MMHG

## 2021-04-21 DIAGNOSIS — M47.12 MYELOPATHY, SPONDYLOGENIC, CERVICAL: ICD-10-CM

## 2021-04-21 PROCEDURE — 72040 X-RAY EXAM NECK SPINE 2-3 VW: CPT | Mod: TC

## 2021-04-21 PROCEDURE — 72040 X-RAY EXAM NECK SPINE 2-3 VW: CPT | Mod: 26,,, | Performed by: RADIOLOGY

## 2021-04-21 PROCEDURE — 99999 PR PBB SHADOW E&M-EST. PATIENT-LVL IV: CPT | Mod: PBBFAC,,, | Performed by: NEUROLOGICAL SURGERY

## 2021-04-21 PROCEDURE — 99999 PR PBB SHADOW E&M-EST. PATIENT-LVL IV: ICD-10-PCS | Mod: PBBFAC,,, | Performed by: NEUROLOGICAL SURGERY

## 2021-04-21 PROCEDURE — 99204 PR OFFICE/OUTPT VISIT, NEW, LEVL IV, 45-59 MIN: ICD-10-PCS | Mod: S$GLB,,, | Performed by: NEUROLOGICAL SURGERY

## 2021-04-21 PROCEDURE — 99204 OFFICE O/P NEW MOD 45 MIN: CPT | Mod: S$GLB,,, | Performed by: NEUROLOGICAL SURGERY

## 2021-04-21 PROCEDURE — 72040 XR CERVICAL SPINE FLEXION  AND EXTENSION ONLY: ICD-10-PCS | Mod: 26,,, | Performed by: RADIOLOGY

## 2021-04-22 ENCOUNTER — PATIENT MESSAGE (OUTPATIENT)
Dept: NEUROSURGERY | Facility: CLINIC | Age: 56
End: 2021-04-22

## 2021-05-13 ENCOUNTER — PATIENT OUTREACH (OUTPATIENT)
Dept: ADMINISTRATIVE | Facility: HOSPITAL | Age: 56
End: 2021-05-13

## 2021-06-07 ENCOUNTER — PATIENT MESSAGE (OUTPATIENT)
Dept: PSYCHIATRY | Facility: CLINIC | Age: 56
End: 2021-06-07

## 2021-06-08 ENCOUNTER — OFFICE VISIT (OUTPATIENT)
Dept: PSYCHIATRY | Facility: CLINIC | Age: 56
End: 2021-06-08

## 2021-06-08 DIAGNOSIS — G47.00 INSOMNIA, UNSPECIFIED TYPE: ICD-10-CM

## 2021-06-08 DIAGNOSIS — F90.9 ATTENTION DEFICIT HYPERACTIVITY DISORDER (ADHD), UNSPECIFIED ADHD TYPE: Primary | ICD-10-CM

## 2021-06-08 PROCEDURE — 99213 PR OFFICE/OUTPT VISIT, EST, LEVL III, 20-29 MIN: ICD-10-PCS | Mod: 95,,, | Performed by: NURSE PRACTITIONER

## 2021-06-08 PROCEDURE — 99213 OFFICE O/P EST LOW 20 MIN: CPT | Mod: 95,,, | Performed by: NURSE PRACTITIONER

## 2021-06-08 RX ORDER — DEXTROAMPHETAMINE SACCHARATE, AMPHETAMINE ASPARTATE MONOHYDRATE, DEXTROAMPHETAMINE SULFATE AND AMPHETAMINE SULFATE 7.5; 7.5; 7.5; 7.5 MG/1; MG/1; MG/1; MG/1
30 CAPSULE, EXTENDED RELEASE ORAL EVERY MORNING
Qty: 30 CAPSULE | Refills: 0 | Status: SHIPPED | OUTPATIENT
Start: 2021-06-08 | End: 2021-09-27 | Stop reason: SDUPTHER

## 2021-06-08 RX ORDER — CLONIDINE HYDROCHLORIDE 0.1 MG/1
TABLET ORAL
Qty: 90 TABLET | Refills: 3 | Status: SHIPPED | OUTPATIENT
Start: 2021-06-08 | End: 2022-01-18 | Stop reason: SDUPTHER

## 2021-06-08 RX ORDER — DEXTROAMPHETAMINE SACCHARATE, AMPHETAMINE ASPARTATE MONOHYDRATE, DEXTROAMPHETAMINE SULFATE AND AMPHETAMINE SULFATE 7.5; 7.5; 7.5; 7.5 MG/1; MG/1; MG/1; MG/1
30 CAPSULE, EXTENDED RELEASE ORAL EVERY MORNING
Qty: 30 CAPSULE | Refills: 0 | Status: SHIPPED | OUTPATIENT
Start: 2021-07-08 | End: 2021-09-27 | Stop reason: SDUPTHER

## 2021-06-08 RX ORDER — DEXTROAMPHETAMINE SACCHARATE, AMPHETAMINE ASPARTATE MONOHYDRATE, DEXTROAMPHETAMINE SULFATE AND AMPHETAMINE SULFATE 7.5; 7.5; 7.5; 7.5 MG/1; MG/1; MG/1; MG/1
30 CAPSULE, EXTENDED RELEASE ORAL EVERY MORNING
Qty: 30 CAPSULE | Refills: 0 | Status: SHIPPED | OUTPATIENT
Start: 2021-08-08 | End: 2021-09-27 | Stop reason: SDUPTHER

## 2021-09-03 ENCOUNTER — PATIENT MESSAGE (OUTPATIENT)
Dept: NEUROSURGERY | Facility: CLINIC | Age: 56
End: 2021-09-03

## 2021-09-27 ENCOUNTER — OFFICE VISIT (OUTPATIENT)
Dept: PSYCHIATRY | Facility: CLINIC | Age: 56
End: 2021-09-27

## 2021-09-27 DIAGNOSIS — F90.9 ATTENTION DEFICIT HYPERACTIVITY DISORDER (ADHD), UNSPECIFIED ADHD TYPE: ICD-10-CM

## 2021-09-27 PROCEDURE — 99213 OFFICE O/P EST LOW 20 MIN: CPT | Mod: 95,,, | Performed by: NURSE PRACTITIONER

## 2021-09-27 PROCEDURE — 99213 PR OFFICE/OUTPT VISIT, EST, LEVL III, 20-29 MIN: ICD-10-PCS | Mod: 95,,, | Performed by: NURSE PRACTITIONER

## 2021-09-27 RX ORDER — DEXTROAMPHETAMINE SACCHARATE, AMPHETAMINE ASPARTATE MONOHYDRATE, DEXTROAMPHETAMINE SULFATE AND AMPHETAMINE SULFATE 7.5; 7.5; 7.5; 7.5 MG/1; MG/1; MG/1; MG/1
30 CAPSULE, EXTENDED RELEASE ORAL EVERY MORNING
Qty: 30 CAPSULE | Refills: 0 | Status: SHIPPED | OUTPATIENT
Start: 2021-11-27 | End: 2022-01-18 | Stop reason: SDUPTHER

## 2021-09-27 RX ORDER — DEXTROAMPHETAMINE SACCHARATE, AMPHETAMINE ASPARTATE MONOHYDRATE, DEXTROAMPHETAMINE SULFATE AND AMPHETAMINE SULFATE 7.5; 7.5; 7.5; 7.5 MG/1; MG/1; MG/1; MG/1
30 CAPSULE, EXTENDED RELEASE ORAL EVERY MORNING
Qty: 30 CAPSULE | Refills: 0 | Status: SHIPPED | OUTPATIENT
Start: 2021-09-27 | End: 2021-09-27 | Stop reason: SDUPTHER

## 2021-09-27 RX ORDER — DEXTROAMPHETAMINE SACCHARATE, AMPHETAMINE ASPARTATE MONOHYDRATE, DEXTROAMPHETAMINE SULFATE AND AMPHETAMINE SULFATE 7.5; 7.5; 7.5; 7.5 MG/1; MG/1; MG/1; MG/1
30 CAPSULE, EXTENDED RELEASE ORAL EVERY MORNING
Qty: 30 CAPSULE | Refills: 0 | Status: SHIPPED | OUTPATIENT
Start: 2021-10-27 | End: 2021-11-02 | Stop reason: SDUPTHER

## 2021-12-29 ENCOUNTER — TELEPHONE (OUTPATIENT)
Dept: OBSTETRICS AND GYNECOLOGY | Facility: CLINIC | Age: 56
End: 2021-12-29

## 2021-12-29 DIAGNOSIS — Z12.31 ENCOUNTER FOR SCREENING MAMMOGRAM FOR MALIGNANT NEOPLASM OF BREAST: Primary | ICD-10-CM

## 2022-01-18 ENCOUNTER — PATIENT MESSAGE (OUTPATIENT)
Dept: ADMINISTRATIVE | Facility: HOSPITAL | Age: 57
End: 2022-01-18
Payer: COMMERCIAL

## 2022-01-18 ENCOUNTER — OFFICE VISIT (OUTPATIENT)
Dept: PSYCHIATRY | Facility: CLINIC | Age: 57
End: 2022-01-18
Payer: COMMERCIAL

## 2022-01-18 DIAGNOSIS — F90.9 ATTENTION DEFICIT HYPERACTIVITY DISORDER (ADHD), UNSPECIFIED ADHD TYPE: ICD-10-CM

## 2022-01-18 DIAGNOSIS — G47.00 INSOMNIA, UNSPECIFIED TYPE: ICD-10-CM

## 2022-01-18 PROCEDURE — 99213 PR OFFICE/OUTPT VISIT, EST, LEVL III, 20-29 MIN: ICD-10-PCS | Mod: 95,,, | Performed by: NURSE PRACTITIONER

## 2022-01-18 PROCEDURE — 99213 OFFICE O/P EST LOW 20 MIN: CPT | Mod: 95,,, | Performed by: NURSE PRACTITIONER

## 2022-01-18 RX ORDER — CLONIDINE HYDROCHLORIDE 0.1 MG/1
TABLET ORAL
Qty: 90 TABLET | Refills: 3 | Status: SHIPPED | OUTPATIENT
Start: 2022-01-18 | End: 2022-05-05 | Stop reason: SDUPTHER

## 2022-01-18 RX ORDER — DEXTROAMPHETAMINE SACCHARATE, AMPHETAMINE ASPARTATE MONOHYDRATE, DEXTROAMPHETAMINE SULFATE AND AMPHETAMINE SULFATE 7.5; 7.5; 7.5; 7.5 MG/1; MG/1; MG/1; MG/1
30 CAPSULE, EXTENDED RELEASE ORAL EVERY MORNING
Qty: 30 CAPSULE | Refills: 0 | Status: SHIPPED | OUTPATIENT
Start: 2022-03-17 | End: 2022-05-05 | Stop reason: SDUPTHER

## 2022-01-18 RX ORDER — DEXTROAMPHETAMINE SACCHARATE, AMPHETAMINE ASPARTATE MONOHYDRATE, DEXTROAMPHETAMINE SULFATE AND AMPHETAMINE SULFATE 7.5; 7.5; 7.5; 7.5 MG/1; MG/1; MG/1; MG/1
30 CAPSULE, EXTENDED RELEASE ORAL EVERY MORNING
Qty: 30 CAPSULE | Refills: 0 | Status: SHIPPED | OUTPATIENT
Start: 2022-01-18 | End: 2022-05-05 | Stop reason: SDUPTHER

## 2022-01-18 RX ORDER — DEXTROAMPHETAMINE SACCHARATE, AMPHETAMINE ASPARTATE MONOHYDRATE, DEXTROAMPHETAMINE SULFATE AND AMPHETAMINE SULFATE 7.5; 7.5; 7.5; 7.5 MG/1; MG/1; MG/1; MG/1
30 CAPSULE, EXTENDED RELEASE ORAL EVERY MORNING
Qty: 30 CAPSULE | Refills: 0 | Status: SHIPPED | OUTPATIENT
Start: 2022-02-17 | End: 2022-05-05 | Stop reason: SDUPTHER

## 2022-01-18 NOTE — PROGRESS NOTES
Outpatient Psychiatry Follow-Up Visit (MD/NP)    1/18/2022    Clinical Status of Patient:  Outpatient (Ambulatory)    Chief Complaint:  Claire Casper is a 56 y.o. female who presents today for follow-up of attention problems.  Met with patient.      Last visit was: 9/27/2021. Chart and  reviewed.   The patient location is: [facility]   The chief complaint leading to consultation is: attention problems    Visit type: audiovisual    Face to Face time with patient: 27 minutes  30 minutes of total time spent on the encounter, which includes face to face time and non-face to face time preparing to see the patient (eg, review of tests), Obtaining and/or reviewing separately obtained history, Documenting clinical information in the electronic or other health record, Independently interpreting results (not separately reported) and communicating results to the patient/family/caregiver, or Care coordination (not separately reported).     Each patient to whom he or she provides medical services by telemedicine is:  (1) informed of the relationship between the physician and patient and the respective role of any other health care provider with respect to management of the patient; and (2) notified that he or she may decline to receive medical services by telemedicine and may withdraw from such care at any time.    Interval History and Content of Current Session:  Current Psychiatric Medications/changes  · Continue Adderall XR 30  Mg po daily (3-months Rx printed)  · Continue Clonidine 0.1 mg po q hs for insomnia - discussed alternatives    Virtual Visit:  Pt present bright affect and euthymic mood. Reports effective response from medications and denies side effects. Denies any unmanaged ADHD or insomnia symptoms.  Denies SI/HI/AVH.     Psychotherapy:  · Target symptoms: distractability  · Why chosen therapy is appropriate versus another modality: relevant to diagnosis  · Outcome monitoring methods:  self-report  · Therapeutic intervention type: insight oriented psychotherapy  · Topics discussed/themes: building skills sets for symptom management, symptom recognition  · The patient's response to the intervention is accepting. The patient's progress toward treatment goals is good.   · Duration of intervention: 12 minutes.    Review of Systems   · PSYCHIATRIC: Pertinant items are noted in the narrative.  · CONSTITUTIONAL: No weight gain or loss.   · MUSCULOSKELETAL: No pain or stiffness of the joints.  · NEUROLOGIC: No weakness, sensory changes, seizures, confusion, memory loss, tremor or other abnormal movements.  · ENDOCRINE: No polydipsia or polyuria.  · INTEGUMENTARY: No rashes or lacerations.  · EYES: No exophthalmos, jaundice or blindness.  · ENT: No dizziness, tinnitus or hearing loss.  · RESPIRATORY: No shortness of breath.  · CARDIOVASCULAR: No tachycardia or chest pain.  · GASTROINTESTINAL: No nausea, vomiting, pain, constipation or diarrhea.  · GENITOURINARY: No frequency, dysuria or sexual dysfunction.  · HEMATOLOGIC/LYMPHATIC: No excessive bleeding, prolonged or excessive bleeding after dental extraction/injury.  · ALLERGIC/IMMUNOLOGIC: No allergic response to materials, foods or animals at this time.    Past Medical, Family and Social History: The patient's past medical, family and social history have been reviewed and updated as appropriate within the electronic medical record - see encounter notes.    Compliance: no    Side effects: None    Risk Parameters:  Patient reports no suicidal ideation  Patient reports no homicidal ideation  Patient reports no self-injurious behavior  Patient reports no violent behavior    Exam (detailed: at least 9 elements; comprehensive: all 15 elements)   Constitutional  Vitals:  Most recent vital signs, dated greater than 90 days prior to this appointment, were reviewed.   There were no vitals filed for this visit.     General:  unremarkable, age appropriate      Musculoskeletal  Muscle Strength/Tone:  no tremor, no tic   Gait & Station:  non-ataxic     Psychiatric  Speech:  no latency; no press   Mood & Affect:  steady  congruent and appropriate   Thought Process:  normal and logical   Associations:  intact   Thought Content:  normal, no suicidality, no homicidality, delusions, or paranoia   Insight:  intact   Judgement: behavior is adequate to circumstances   Orientation:  grossly intact   Memory: intact for content of interview   Language: grossly intact   Attention Span & Concentration:  able to focus   Fund of Knowledge:  intact and appropriate to age and level of education     Assessment and Diagnosis   Status/Progress: Based on the examination today, the patient's problem(s) is/are improved and well controlled.  New problems have not been presented today.   Co-morbidities and Lack of compliance are not complicating management of the primary condition.  There are no active rule-out diagnoses for this patient at this time.     General Impression:       ICD-10-CM ICD-9-CM   1. Attention deficit hyperactivity disorder (ADHD), unspecified ADHD type  F90.9 314.01   2. Insomnia, unspecified type  G47.00 780.52       Intervention/Counseling/Treatment Plan   · Medication Management: Continue current medications. The risks and benefits of medication were discussed with the patient.  · Continue Adderall XR 30  Mg po daily   · Continue Clonidine 0.1 mg po q hs for insomnia -     Return to Clinic: 3 months    Risks, benefits, side effects and alternative treatments discussed with patient. Patient agrees with the current plan as documented.  Encouraged Patient to keep future appointments.  Take medications as prescribed and abstain from substance abuse.  Pt to present to ED for thoughts to harm herself or others

## 2022-05-05 ENCOUNTER — OFFICE VISIT (OUTPATIENT)
Dept: PSYCHIATRY | Facility: CLINIC | Age: 57
End: 2022-05-05
Payer: COMMERCIAL

## 2022-05-05 DIAGNOSIS — G47.00 INSOMNIA, UNSPECIFIED TYPE: ICD-10-CM

## 2022-05-05 DIAGNOSIS — F90.9 ATTENTION DEFICIT HYPERACTIVITY DISORDER (ADHD), UNSPECIFIED ADHD TYPE: ICD-10-CM

## 2022-05-05 PROCEDURE — 99213 OFFICE O/P EST LOW 20 MIN: CPT | Mod: 95,,, | Performed by: NURSE PRACTITIONER

## 2022-05-05 PROCEDURE — 99213 PR OFFICE/OUTPT VISIT, EST, LEVL III, 20-29 MIN: ICD-10-PCS | Mod: 95,,, | Performed by: NURSE PRACTITIONER

## 2022-05-05 RX ORDER — CLONIDINE HYDROCHLORIDE 0.1 MG/1
TABLET ORAL
Qty: 90 TABLET | Refills: 3 | Status: SHIPPED | OUTPATIENT
Start: 2022-05-05 | End: 2022-09-01 | Stop reason: SDUPTHER

## 2022-05-05 RX ORDER — DEXTROAMPHETAMINE SACCHARATE, AMPHETAMINE ASPARTATE MONOHYDRATE, DEXTROAMPHETAMINE SULFATE AND AMPHETAMINE SULFATE 7.5; 7.5; 7.5; 7.5 MG/1; MG/1; MG/1; MG/1
30 CAPSULE, EXTENDED RELEASE ORAL EVERY MORNING
Qty: 30 CAPSULE | Refills: 0 | Status: SHIPPED | OUTPATIENT
Start: 2022-06-05 | End: 2022-09-01 | Stop reason: SDUPTHER

## 2022-05-05 RX ORDER — DEXTROAMPHETAMINE SACCHARATE, AMPHETAMINE ASPARTATE MONOHYDRATE, DEXTROAMPHETAMINE SULFATE AND AMPHETAMINE SULFATE 7.5; 7.5; 7.5; 7.5 MG/1; MG/1; MG/1; MG/1
30 CAPSULE, EXTENDED RELEASE ORAL EVERY MORNING
Qty: 30 CAPSULE | Refills: 0 | Status: SHIPPED | OUTPATIENT
Start: 2022-05-05 | End: 2022-09-01 | Stop reason: SDUPTHER

## 2022-05-05 RX ORDER — DEXTROAMPHETAMINE SACCHARATE, AMPHETAMINE ASPARTATE MONOHYDRATE, DEXTROAMPHETAMINE SULFATE AND AMPHETAMINE SULFATE 7.5; 7.5; 7.5; 7.5 MG/1; MG/1; MG/1; MG/1
30 CAPSULE, EXTENDED RELEASE ORAL EVERY MORNING
Qty: 30 CAPSULE | Refills: 0 | Status: SHIPPED | OUTPATIENT
Start: 2022-07-05 | End: 2022-09-01 | Stop reason: SDUPTHER

## 2022-05-05 NOTE — PROGRESS NOTES
Outpatient Psychiatry Follow-Up Visit (MD/NP)    5/5/2022    Clinical Status of Patient:  Outpatient (Ambulatory)    Chief Complaint:  Claire Casper is a 56 y.o. female who presents today for follow-up of attention problems.  Met with patient.      Last visit was: 1/18/22. Chart and  reviewed.   The patient location is: [facility]   The chief complaint leading to consultation is: attention problems    Visit type: audiovisual    Face to Face time with patient: 27 minutes  30 minutes of total time spent on the encounter, which includes face to face time and non-face to face time preparing to see the patient (eg, review of tests), Obtaining and/or reviewing separately obtained history, Documenting clinical information in the electronic or other health record, Independently interpreting results (not separately reported) and communicating results to the patient/family/caregiver, or Care coordination (not separately reported).     Each patient to whom he or she provides medical services by telemedicine is:  (1) informed of the relationship between the physician and patient and the respective role of any other health care provider with respect to management of the patient; and (2) notified that he or she may decline to receive medical services by telemedicine and may withdraw from such care at any time.    Interval History and Content of Current Session:  Current Psychiatric Medications/changes  · Continue Adderall XR 30  Mg po daily (3-months Rx printed)  · Continue Clonidine 0.1 mg po q hs for insomnia - discussed alternatives    Virtual Visit:  Pt present bright affect and euthymic mood. Reports effective response from medications and denies side effects. Denies any unmanaged ADHD or insomnia symptoms.  Denies SI/HI/AVH.     Psychotherapy:  · Target symptoms: distractability  · Why chosen therapy is appropriate versus another modality: relevant to diagnosis  · Outcome monitoring methods:  self-report  · Therapeutic intervention type: insight oriented psychotherapy  · Topics discussed/themes: building skills sets for symptom management, symptom recognition  · The patient's response to the intervention is accepting. The patient's progress toward treatment goals is good.   · Duration of intervention: 12 minutes.    Review of Systems   · PSYCHIATRIC: Pertinant items are noted in the narrative.  · CONSTITUTIONAL: No weight gain or loss.   · MUSCULOSKELETAL: No pain or stiffness of the joints.  · NEUROLOGIC: No weakness, sensory changes, seizures, confusion, memory loss, tremor or other abnormal movements.  · ENDOCRINE: No polydipsia or polyuria.  · INTEGUMENTARY: No rashes or lacerations.  · EYES: No exophthalmos, jaundice or blindness.  · ENT: No dizziness, tinnitus or hearing loss.  · RESPIRATORY: No shortness of breath.  · CARDIOVASCULAR: No tachycardia or chest pain.  · GASTROINTESTINAL: No nausea, vomiting, pain, constipation or diarrhea.  · GENITOURINARY: No frequency, dysuria or sexual dysfunction.  · HEMATOLOGIC/LYMPHATIC: No excessive bleeding, prolonged or excessive bleeding after dental extraction/injury.  · ALLERGIC/IMMUNOLOGIC: No allergic response to materials, foods or animals at this time.    Past Medical, Family and Social History: The patient's past medical, family and social history have been reviewed and updated as appropriate within the electronic medical record - see encounter notes.    Compliance: no    Side effects: None    Risk Parameters:  Patient reports no suicidal ideation  Patient reports no homicidal ideation  Patient reports no self-injurious behavior  Patient reports no violent behavior    Exam (detailed: at least 9 elements; comprehensive: all 15 elements)   Constitutional  Vitals:  Most recent vital signs, dated greater than 90 days prior to this appointment, were reviewed.   There were no vitals filed for this visit.     General:  unremarkable, age appropriate      Musculoskeletal  Muscle Strength/Tone:  no tremor, no tic   Gait & Station:  non-ataxic     Psychiatric  Speech:  no latency; no press   Mood & Affect:  steady  congruent and appropriate   Thought Process:  normal and logical   Associations:  intact   Thought Content:  normal, no suicidality, no homicidality, delusions, or paranoia   Insight:  intact   Judgement: behavior is adequate to circumstances   Orientation:  grossly intact   Memory: intact for content of interview   Language: grossly intact   Attention Span & Concentration:  able to focus   Fund of Knowledge:  intact and appropriate to age and level of education     Assessment and Diagnosis   Status/Progress: Based on the examination today, the patient's problem(s) is/are improved and well controlled.  New problems have not been presented today.   Co-morbidities and Lack of compliance are not complicating management of the primary condition.  There are no active rule-out diagnoses for this patient at this time.     General Impression:       ICD-10-CM ICD-9-CM   1. Attention deficit hyperactivity disorder (ADHD), unspecified ADHD type  F90.9 314.01   2. Insomnia, unspecified type  G47.00 780.52       Intervention/Counseling/Treatment Plan   · Medication Management: Continue current medications. The risks and benefits of medication were discussed with the patient.  · Continue Adderall XR 30  Mg po daily   · Continue Clonidine 0.1 mg po q hs for insomnia -     Return to Clinic: 3 months    Risks, benefits, side effects and alternative treatments discussed with patient. Patient agrees with the current plan as documented.  Encouraged Patient to keep future appointments.  Take medications as prescribed and abstain from substance abuse.  Pt to present to ED for thoughts to harm herself or others

## 2022-05-30 ENCOUNTER — PATIENT MESSAGE (OUTPATIENT)
Dept: ADMINISTRATIVE | Facility: HOSPITAL | Age: 57
End: 2022-05-30
Payer: COMMERCIAL

## 2022-07-11 ENCOUNTER — PATIENT OUTREACH (OUTPATIENT)
Dept: ADMINISTRATIVE | Facility: HOSPITAL | Age: 57
End: 2022-07-11
Payer: COMMERCIAL

## 2022-07-11 NOTE — PROGRESS NOTES
Patient due for the following    Hepatitis C Screening     HIV Screening     TETANUS VACCINE     Colorectal Cancer Screening     Shingles Vaccine (1 of 2)    Mammogram     COVID-19 Vaccine (4 - Booster for Pfizer series)        Immunizations: reviewed and updated  Care Everywhere: triggered  Care Teams: up to datte  Outreach: patient will return call.

## 2022-07-12 ENCOUNTER — PATIENT MESSAGE (OUTPATIENT)
Dept: ADMINISTRATIVE | Facility: HOSPITAL | Age: 57
End: 2022-07-12
Payer: COMMERCIAL

## 2022-09-01 ENCOUNTER — OFFICE VISIT (OUTPATIENT)
Dept: PSYCHIATRY | Facility: CLINIC | Age: 57
End: 2022-09-01
Payer: COMMERCIAL

## 2022-09-01 DIAGNOSIS — F90.9 ATTENTION DEFICIT HYPERACTIVITY DISORDER (ADHD), UNSPECIFIED ADHD TYPE: Primary | ICD-10-CM

## 2022-09-01 DIAGNOSIS — G47.00 INSOMNIA, UNSPECIFIED TYPE: ICD-10-CM

## 2022-09-01 PROCEDURE — 99213 PR OFFICE/OUTPT VISIT, EST, LEVL III, 20-29 MIN: ICD-10-PCS | Mod: 95,,, | Performed by: NURSE PRACTITIONER

## 2022-09-01 PROCEDURE — 99213 OFFICE O/P EST LOW 20 MIN: CPT | Mod: 95,,, | Performed by: NURSE PRACTITIONER

## 2022-09-01 RX ORDER — DEXTROAMPHETAMINE SACCHARATE, AMPHETAMINE ASPARTATE MONOHYDRATE, DEXTROAMPHETAMINE SULFATE AND AMPHETAMINE SULFATE 7.5; 7.5; 7.5; 7.5 MG/1; MG/1; MG/1; MG/1
30 CAPSULE, EXTENDED RELEASE ORAL EVERY MORNING
Qty: 30 CAPSULE | Refills: 0 | Status: SHIPPED | OUTPATIENT
Start: 2022-11-01 | End: 2022-10-12 | Stop reason: SDUPTHER

## 2022-09-01 RX ORDER — DEXTROAMPHETAMINE SACCHARATE, AMPHETAMINE ASPARTATE MONOHYDRATE, DEXTROAMPHETAMINE SULFATE AND AMPHETAMINE SULFATE 7.5; 7.5; 7.5; 7.5 MG/1; MG/1; MG/1; MG/1
30 CAPSULE, EXTENDED RELEASE ORAL EVERY MORNING
Qty: 30 CAPSULE | Refills: 0 | Status: SHIPPED | OUTPATIENT
Start: 2022-09-01 | End: 2023-01-06 | Stop reason: SDUPTHER

## 2022-09-01 RX ORDER — CLONIDINE HYDROCHLORIDE 0.1 MG/1
TABLET ORAL
Qty: 90 TABLET | Refills: 3 | Status: SHIPPED | OUTPATIENT
Start: 2022-09-01 | End: 2023-01-06 | Stop reason: SDUPTHER

## 2022-09-01 RX ORDER — DEXTROAMPHETAMINE SACCHARATE, AMPHETAMINE ASPARTATE MONOHYDRATE, DEXTROAMPHETAMINE SULFATE AND AMPHETAMINE SULFATE 7.5; 7.5; 7.5; 7.5 MG/1; MG/1; MG/1; MG/1
30 CAPSULE, EXTENDED RELEASE ORAL EVERY MORNING
Qty: 30 CAPSULE | Refills: 0 | Status: SHIPPED | OUTPATIENT
Start: 2022-10-01 | End: 2022-11-11 | Stop reason: SDUPTHER

## 2022-09-01 NOTE — PROGRESS NOTES
Outpatient Psychiatry Follow-Up Visit (MD/NP)    9/1/2022    Clinical Status of Patient:  Outpatient (Ambulatory)    Chief Complaint:  Claire Casper is a 56 y.o. female who presents today for follow-up of attention problems.  Met with patient.      Last visit was:  5/05/22. Chart and  reviewed.   The patient location is: [facility]   The chief complaint leading to consultation is: attention problems    Visit type: audiovisual    Face to Face time with patient: 30 minutes  30 minutes of total time spent on the encounter, which includes face to face time and non-face to face time preparing to see the patient (eg, review of tests), Obtaining and/or reviewing separately obtained history, Documenting clinical information in the electronic or other health record, Independently interpreting results (not separately reported) and communicating results to the patient/family/caregiver, or Care coordination (not separately reported).     Each patient to whom he or she provides medical services by telemedicine is:  (1) informed of the relationship between the physician and patient and the respective role of any other health care provider with respect to management of the patient; and (2) notified that he or she may decline to receive medical services by telemedicine and may withdraw from such care at any time.    Interval History and Content of Current Session:  Current Psychiatric Medications/changes  Continue Adderall XR 30  Mg po daily (3-months Rx printed)  Continue Clonidine 0.1 mg po q hs for insomnia - discussed alternatives    Virtual Visit:  Pt present bright affect and euthymic mood. Reports effective response from medications and denies side effects. Denies any unmanaged ADHD or insomnia symptoms.  Denies SI/HI/AVH.     Psychotherapy:  Target symptoms: distractability  Why chosen therapy is appropriate versus another modality: relevant to diagnosis  Outcome monitoring methods:  self-report  Therapeutic intervention type: insight oriented psychotherapy  Topics discussed/themes: building skills sets for symptom management, symptom recognition  The patient's response to the intervention is accepting. The patient's progress toward treatment goals is good.   Duration of intervention: 12 minutes.    Review of Systems   PSYCHIATRIC: Pertinant items are noted in the narrative.  CONSTITUTIONAL: No weight gain or loss.   MUSCULOSKELETAL: No pain or stiffness of the joints.  NEUROLOGIC: No weakness, sensory changes, seizures, confusion, memory loss, tremor or other abnormal movements.  ENDOCRINE: No polydipsia or polyuria.  INTEGUMENTARY: No rashes or lacerations.  EYES: No exophthalmos, jaundice or blindness.  ENT: No dizziness, tinnitus or hearing loss.  RESPIRATORY: No shortness of breath.  CARDIOVASCULAR: No tachycardia or chest pain.  GASTROINTESTINAL: No nausea, vomiting, pain, constipation or diarrhea.  GENITOURINARY: No frequency, dysuria or sexual dysfunction.  HEMATOLOGIC/LYMPHATIC: No excessive bleeding, prolonged or excessive bleeding after dental extraction/injury.  ALLERGIC/IMMUNOLOGIC: No allergic response to materials, foods or animals at this time.    Past Medical, Family and Social History: The patient's past medical, family and social history have been reviewed and updated as appropriate within the electronic medical record - see encounter notes.    Compliance: no    Side effects: None    Risk Parameters:  Patient reports no suicidal ideation  Patient reports no homicidal ideation  Patient reports no self-injurious behavior  Patient reports no violent behavior    Exam (detailed: at least 9 elements; comprehensive: all 15 elements)   Constitutional  Vitals:  Most recent vital signs, dated greater than 90 days prior to this appointment, were reviewed.   There were no vitals filed for this visit.     General:  unremarkable, age appropriate     Musculoskeletal  Muscle Strength/Tone:   no tremor, no tic   Gait & Station:  non-ataxic     Psychiatric  Speech:  no latency; no press   Mood & Affect:  steady  congruent and appropriate   Thought Process:  normal and logical   Associations:  intact   Thought Content:  normal, no suicidality, no homicidality, delusions, or paranoia   Insight:  intact   Judgement: behavior is adequate to circumstances   Orientation:  grossly intact   Memory: intact for content of interview   Language: grossly intact   Attention Span & Concentration:  able to focus   Fund of Knowledge:  intact and appropriate to age and level of education     Assessment and Diagnosis   Status/Progress: Based on the examination today, the patient's problem(s) is/are improved and well controlled.  New problems have not been presented today.   Co-morbidities and Lack of compliance are not complicating management of the primary condition.  There are no active rule-out diagnoses for this patient at this time.     General Impression:       ICD-10-CM ICD-9-CM   1. Insomnia, unspecified type  G47.00 780.52   2. Attention deficit hyperactivity disorder (ADHD), unspecified ADHD type  F90.9 314.01         Intervention/Counseling/Treatment Plan   Medication Management: Continue current medications. The risks and benefits of medication were discussed with the patient.  Continue Adderall XR 30  Mg po daily   Continue Clonidine 0.1 mg po q hs for insomnia -     Return to Clinic: 3 months    Risks, benefits, side effects and alternative treatments discussed with patient. Patient agrees with the current plan as documented.  Encouraged Patient to keep future appointments.  Take medications as prescribed and abstain from substance abuse.  Pt to present to ED for thoughts to harm herself or others

## 2022-10-10 ENCOUNTER — PATIENT MESSAGE (OUTPATIENT)
Dept: ADMINISTRATIVE | Facility: HOSPITAL | Age: 57
End: 2022-10-10
Payer: COMMERCIAL

## 2022-10-12 ENCOUNTER — TELEPHONE (OUTPATIENT)
Dept: PSYCHIATRY | Facility: CLINIC | Age: 57
End: 2022-10-12
Payer: COMMERCIAL

## 2022-10-12 DIAGNOSIS — F90.9 ATTENTION DEFICIT HYPERACTIVITY DISORDER (ADHD), UNSPECIFIED ADHD TYPE: ICD-10-CM

## 2022-10-12 RX ORDER — DEXTROAMPHETAMINE SACCHARATE, AMPHETAMINE ASPARTATE MONOHYDRATE, DEXTROAMPHETAMINE SULFATE AND AMPHETAMINE SULFATE 7.5; 7.5; 7.5; 7.5 MG/1; MG/1; MG/1; MG/1
30 CAPSULE, EXTENDED RELEASE ORAL EVERY MORNING
Qty: 30 CAPSULE | Refills: 0 | Status: SHIPPED | OUTPATIENT
Start: 2022-10-12 | End: 2023-01-06 | Stop reason: SDUPTHER

## 2022-10-12 NOTE — TELEPHONE ENCOUNTER
----- Message from Lakia Dozier sent at 10/11/2022  2:16 PM CDT -----  Regarding: Refill  Pt is requesting a refill of dextroamphetamine-amphetamine (ADDERALL XR) 30 MG 24 hr capsule from [address], [address], [phone].  Last seen on 9/1/22 with no future scheduled appts.  Pt says she sent several messages, but has not received a response.     She can be reached at [phone].    Thank you.

## 2022-11-11 ENCOUNTER — PATIENT MESSAGE (OUTPATIENT)
Dept: PSYCHIATRY | Facility: CLINIC | Age: 57
End: 2022-11-11
Payer: COMMERCIAL

## 2023-01-06 ENCOUNTER — OFFICE VISIT (OUTPATIENT)
Dept: PSYCHIATRY | Facility: CLINIC | Age: 58
End: 2023-01-06
Payer: COMMERCIAL

## 2023-01-06 DIAGNOSIS — F90.9 ATTENTION DEFICIT HYPERACTIVITY DISORDER (ADHD), UNSPECIFIED ADHD TYPE: ICD-10-CM

## 2023-01-06 DIAGNOSIS — G47.00 INSOMNIA, UNSPECIFIED TYPE: ICD-10-CM

## 2023-01-06 PROCEDURE — 99213 OFFICE O/P EST LOW 20 MIN: CPT | Mod: 95,,, | Performed by: NURSE PRACTITIONER

## 2023-01-06 PROCEDURE — 99213 PR OFFICE/OUTPT VISIT, EST, LEVL III, 20-29 MIN: ICD-10-PCS | Mod: 95,,, | Performed by: NURSE PRACTITIONER

## 2023-01-06 RX ORDER — CLONIDINE HYDROCHLORIDE 0.1 MG/1
TABLET ORAL
Qty: 90 TABLET | Refills: 3 | Status: SHIPPED | OUTPATIENT
Start: 2023-01-06 | End: 2023-05-04 | Stop reason: SDUPTHER

## 2023-01-06 RX ORDER — DEXTROAMPHETAMINE SACCHARATE, AMPHETAMINE ASPARTATE MONOHYDRATE, DEXTROAMPHETAMINE SULFATE AND AMPHETAMINE SULFATE 7.5; 7.5; 7.5; 7.5 MG/1; MG/1; MG/1; MG/1
30 CAPSULE, EXTENDED RELEASE ORAL EVERY MORNING
Qty: 30 CAPSULE | Refills: 0 | Status: SHIPPED | OUTPATIENT
Start: 2023-01-06 | End: 2023-05-04

## 2023-01-06 RX ORDER — DEXTROAMPHETAMINE SACCHARATE, AMPHETAMINE ASPARTATE MONOHYDRATE, DEXTROAMPHETAMINE SULFATE AND AMPHETAMINE SULFATE 7.5; 7.5; 7.5; 7.5 MG/1; MG/1; MG/1; MG/1
30 CAPSULE, EXTENDED RELEASE ORAL EVERY MORNING
Qty: 30 CAPSULE | Refills: 0 | Status: SHIPPED | OUTPATIENT
Start: 2023-02-06 | End: 2023-05-04

## 2023-01-06 RX ORDER — DEXTROAMPHETAMINE SACCHARATE, AMPHETAMINE ASPARTATE MONOHYDRATE, DEXTROAMPHETAMINE SULFATE AND AMPHETAMINE SULFATE 7.5; 7.5; 7.5; 7.5 MG/1; MG/1; MG/1; MG/1
30 CAPSULE, EXTENDED RELEASE ORAL EVERY MORNING
Qty: 30 CAPSULE | Refills: 0 | Status: SHIPPED | OUTPATIENT
Start: 2023-03-06 | End: 2023-05-04

## 2023-01-06 NOTE — PROGRESS NOTES
Outpatient Psychiatry Follow-Up Visit (MD/NP)    1/6/2023    Clinical Status of Patient:  Outpatient (Ambulatory)    Chief Complaint:  Claire Casper is a 57 y.o. female who presents today for follow-up of attention problems.  Met with patient.      Last visit was:  9/01/22. Chart and  reviewed.   The patient location is: [facility]   The chief complaint leading to consultation is: attention problems    Visit type: audiovisual    Face to Face time with patient: 28 minutes  30 minutes of total time spent on the encounter, which includes face to face time and non-face to face time preparing to see the patient (eg, review of tests), Obtaining and/or reviewing separately obtained history, Documenting clinical information in the electronic or other health record, Independently interpreting results (not separately reported) and communicating results to the patient/family/caregiver, or Care coordination (not separately reported).     Each patient to whom he or she provides medical services by telemedicine is:  (1) informed of the relationship between the physician and patient and the respective role of any other health care provider with respect to management of the patient; and (2) notified that he or she may decline to receive medical services by telemedicine and may withdraw from such care at any time.    Interval History and Content of Current Session:  Current Psychiatric Medications/changes  Continue Adderall XR 30  Mg po daily (3-months Rx printed)  Continue Clonidine 0.1 mg po q hs for insomnia - discussed alternatives    Virtual Visit:  Pt present bright affect and euthymic mood. Reports effective response from medications and denies side effects. Denies any unmanaged ADHD or insomnia symptoms.  Denies SI/HI/AVH.     Psychotherapy:  Target symptoms: distractability  Why chosen therapy is appropriate versus another modality: relevant to diagnosis  Outcome monitoring methods:  self-report  Therapeutic intervention type: insight oriented psychotherapy  Topics discussed/themes: building skills sets for symptom management, symptom recognition  The patient's response to the intervention is accepting. The patient's progress toward treatment goals is good.   Duration of intervention: 12 minutes.    Review of Systems   PSYCHIATRIC: Pertinant items are noted in the narrative.  CONSTITUTIONAL: No weight gain or loss.   MUSCULOSKELETAL: No pain or stiffness of the joints.  NEUROLOGIC: No weakness, sensory changes, seizures, confusion, memory loss, tremor or other abnormal movements.  ENDOCRINE: No polydipsia or polyuria.  INTEGUMENTARY: No rashes or lacerations.  EYES: No exophthalmos, jaundice or blindness.  ENT: No dizziness, tinnitus or hearing loss.  RESPIRATORY: No shortness of breath.  CARDIOVASCULAR: No tachycardia or chest pain.  GASTROINTESTINAL: No nausea, vomiting, pain, constipation or diarrhea.  GENITOURINARY: No frequency, dysuria or sexual dysfunction.  HEMATOLOGIC/LYMPHATIC: No excessive bleeding, prolonged or excessive bleeding after dental extraction/injury.  ALLERGIC/IMMUNOLOGIC: No allergic response to materials, foods or animals at this time.    Past Medical, Family and Social History: The patient's past medical, family and social history have been reviewed and updated as appropriate within the electronic medical record - see encounter notes.    Compliance: no    Side effects: None    Risk Parameters:  Patient reports no suicidal ideation  Patient reports no homicidal ideation  Patient reports no self-injurious behavior  Patient reports no violent behavior    Exam (detailed: at least 9 elements; comprehensive: all 15 elements)   Constitutional  Vitals:  Most recent vital signs, dated greater than 90 days prior to this appointment, were reviewed.   There were no vitals filed for this visit.     General:  unremarkable, age appropriate     Musculoskeletal  Muscle Strength/Tone:   no tremor, no tic   Gait & Station:  non-ataxic     Psychiatric  Speech:  no latency; no press   Mood & Affect:  steady  congruent and appropriate   Thought Process:  normal and logical   Associations:  intact   Thought Content:  normal, no suicidality, no homicidality, delusions, or paranoia   Insight:  intact   Judgement: behavior is adequate to circumstances   Orientation:  grossly intact   Memory: intact for content of interview   Language: grossly intact   Attention Span & Concentration:  able to focus   Fund of Knowledge:  intact and appropriate to age and level of education     Assessment and Diagnosis   Status/Progress: Based on the examination today, the patient's problem(s) is/are improved and well controlled.  New problems have not been presented today.   Co-morbidities and Lack of compliance are not complicating management of the primary condition.  There are no active rule-out diagnoses for this patient at this time.     General Impression:       ICD-10-CM ICD-9-CM   1. Attention deficit hyperactivity disorder (ADHD), unspecified ADHD type  F90.9 314.01   2. Insomnia, unspecified type  G47.00 780.52     Intervention/Counseling/Treatment Plan   Medication Management: Continue current medications. The risks and benefits of medication were discussed with the patient.  Continue Adderall XR 30  Mg po daily   Continue Clonidine 0.1 mg po q hs for insomnia -     Return to Clinic: 3 months    Risks, benefits, side effects and alternative treatments discussed with patient. Patient agrees with the current plan as documented.  Encouraged Patient to keep future appointments.  Take medications as prescribed and abstain from substance abuse.  Pt to present to ED for thoughts to harm herself or others

## 2023-03-08 ENCOUNTER — TELEPHONE (OUTPATIENT)
Dept: PSYCHIATRY | Facility: CLINIC | Age: 58
End: 2023-03-08

## 2023-03-08 DIAGNOSIS — F90.9 ATTENTION DEFICIT HYPERACTIVITY DISORDER (ADHD), UNSPECIFIED ADHD TYPE: Primary | ICD-10-CM

## 2023-03-08 RX ORDER — DEXTROAMPHETAMINE SACCHARATE, AMPHETAMINE ASPARTATE, DEXTROAMPHETAMINE SULFATE AND AMPHETAMINE SULFATE 7.5; 7.5; 7.5; 7.5 MG/1; MG/1; MG/1; MG/1
30 TABLET ORAL DAILY
Qty: 30 TABLET | Refills: 0 | Status: SHIPPED | OUTPATIENT
Start: 2023-03-08 | End: 2023-05-04

## 2023-03-08 NOTE — TELEPHONE ENCOUNTER
----- Message from Lakia Dozier sent at 3/7/2023  3:57 PM CST -----  Pt is calling regarding her dextroamphetamine-amphetamine (ADDERALL XR) 30 MG 24 hr capsule.  She has not been able to find it, but Sarbjit neumann [facility] has it in the immediate release and suggest she takes it twice daily.  She is requesting a returned call because it needs to be submitted, as soon as possible.    She can be reached at [phone].    Thank you.

## 2023-05-04 ENCOUNTER — OFFICE VISIT (OUTPATIENT)
Dept: PSYCHIATRY | Facility: CLINIC | Age: 58
End: 2023-05-04
Payer: COMMERCIAL

## 2023-05-04 DIAGNOSIS — G47.00 INSOMNIA, UNSPECIFIED TYPE: ICD-10-CM

## 2023-05-04 DIAGNOSIS — F90.9 ATTENTION DEFICIT HYPERACTIVITY DISORDER (ADHD), UNSPECIFIED ADHD TYPE: Primary | ICD-10-CM

## 2023-05-04 PROCEDURE — 99213 OFFICE O/P EST LOW 20 MIN: CPT | Mod: 95,,, | Performed by: NURSE PRACTITIONER

## 2023-05-04 PROCEDURE — 99213 PR OFFICE/OUTPT VISIT, EST, LEVL III, 20-29 MIN: ICD-10-PCS | Mod: 95,,, | Performed by: NURSE PRACTITIONER

## 2023-05-04 RX ORDER — LISDEXAMFETAMINE DIMESYLATE 40 MG/1
40 CAPSULE ORAL DAILY
Qty: 30 CAPSULE | Refills: 0 | Status: SHIPPED | OUTPATIENT
Start: 2023-07-03 | End: 2023-05-17

## 2023-05-04 RX ORDER — LISDEXAMFETAMINE DIMESYLATE 40 MG/1
40 CAPSULE ORAL DAILY
Qty: 30 CAPSULE | Refills: 0 | Status: SHIPPED | OUTPATIENT
Start: 2023-05-04 | End: 2023-05-17

## 2023-05-04 RX ORDER — LISDEXAMFETAMINE DIMESYLATE 40 MG/1
40 CAPSULE ORAL DAILY
Qty: 30 CAPSULE | Refills: 0 | Status: SHIPPED | OUTPATIENT
Start: 2023-06-03 | End: 2023-05-17

## 2023-05-04 RX ORDER — CLONIDINE HYDROCHLORIDE 0.1 MG/1
TABLET ORAL
Qty: 90 TABLET | Refills: 3 | Status: SHIPPED | OUTPATIENT
Start: 2023-05-04

## 2023-05-04 NOTE — PROGRESS NOTES
Outpatient Psychiatry Follow-Up Visit (MD/NP)    5/4/2023    Clinical Status of Patient:  Outpatient (Ambulatory)    Chief Complaint:  Claire Casper is a 57 y.o. female who presents today for follow-up of attention problems.  Met with patient.      Last visit was:  1/06/23. Chart and  reviewed.   The patient location is: [facility]   The chief complaint leading to consultation is: attention problems    Visit type: audiovisual    Face to Face time with patient: 28 minutes  30 minutes of total time spent on the encounter, which includes face to face time and non-face to face time preparing to see the patient (eg, review of tests), Obtaining and/or reviewing separately obtained history, Documenting clinical information in the electronic or other health record, Independently interpreting results (not separately reported) and communicating results to the patient/family/caregiver, or Care coordination (not separately reported).     Each patient to whom he or she provides medical services by telemedicine is:  (1) informed of the relationship between the physician and patient and the respective role of any other health care provider with respect to management of the patient; and (2) notified that he or she may decline to receive medical services by telemedicine and may withdraw from such care at any time.    Interval History and Content of Current Session:  Current Psychiatric Medications/changes  Continue Adderall XR 30  Mg po daily (3-months Rx printed)  Continue Clonidine 0.1 mg po q hs for insomnia - discussed alternatives    Virtual Visit:  Pt present bright affect and euthymic mood. Pt has been unable to get Adderall due to national shortage. Will try Vyvanse.  Denies any unmanaged ADHD or insomnia symptoms when medication is available.  Denies SI/HI/AVH.     Psychotherapy:  Target symptoms: distractability  Why chosen therapy is appropriate versus another modality: relevant to  diagnosis  Outcome monitoring methods: self-report  Therapeutic intervention type: insight oriented psychotherapy  Topics discussed/themes: building skills sets for symptom management, symptom recognition  The patient's response to the intervention is accepting. The patient's progress toward treatment goals is good.   Duration of intervention: 12 minutes.    Review of Systems   PSYCHIATRIC: Pertinant items are noted in the narrative.  CONSTITUTIONAL: No weight gain or loss.   MUSCULOSKELETAL: No pain or stiffness of the joints.  NEUROLOGIC: No weakness, sensory changes, seizures, confusion, memory loss, tremor or other abnormal movements.  ENDOCRINE: No polydipsia or polyuria.  INTEGUMENTARY: No rashes or lacerations.  EYES: No exophthalmos, jaundice or blindness.  ENT: No dizziness, tinnitus or hearing loss.  RESPIRATORY: No shortness of breath.  CARDIOVASCULAR: No tachycardia or chest pain.  GASTROINTESTINAL: No nausea, vomiting, pain, constipation or diarrhea.  GENITOURINARY: No frequency, dysuria or sexual dysfunction.  HEMATOLOGIC/LYMPHATIC: No excessive bleeding, prolonged or excessive bleeding after dental extraction/injury.  ALLERGIC/IMMUNOLOGIC: No allergic response to materials, foods or animals at this time.    Past Medical, Family and Social History: The patient's past medical, family and social history have been reviewed and updated as appropriate within the electronic medical record - see encounter notes.    Compliance: no    Side effects: None    Risk Parameters:  Patient reports no suicidal ideation  Patient reports no homicidal ideation  Patient reports no self-injurious behavior  Patient reports no violent behavior    Exam (detailed: at least 9 elements; comprehensive: all 15 elements)   Constitutional  Vitals:  Most recent vital signs, dated greater than 90 days prior to this appointment, were reviewed.   There were no vitals filed for this visit.     General:  unremarkable, age appropriate      Musculoskeletal  Muscle Strength/Tone:  no tremor, no tic   Gait & Station:  non-ataxic     Psychiatric  Speech:  no latency; no press   Mood & Affect:  steady  congruent and appropriate   Thought Process:  normal and logical   Associations:  intact   Thought Content:  normal, no suicidality, no homicidality, delusions, or paranoia   Insight:  intact   Judgement: behavior is adequate to circumstances   Orientation:  grossly intact   Memory: intact for content of interview   Language: grossly intact   Attention Span & Concentration:  able to focus   Fund of Knowledge:  intact and appropriate to age and level of education     Assessment and Diagnosis   Status/Progress: Based on the examination today, the patient's problem(s) is/are improved and well controlled.  New problems have not been presented today.   Co-morbidities and Lack of compliance are not complicating management of the primary condition.  There are no active rule-out diagnoses for this patient at this time.     General Impression:       ICD-10-CM ICD-9-CM   1. Attention deficit hyperactivity disorder (ADHD), unspecified ADHD type  F90.9 314.01   2. Insomnia, unspecified type  G47.00 780.52       Intervention/Counseling/Treatment Plan   Medication Management: Continue current medications. The risks and benefits of medication were discussed with the patient.  DC Adderall XR (national shortage)  Start VYVANSE 40 mg daily  Continue Clonidine 0.1 mg po q hs for insomnia -     Return to Clinic: 3 months    Risks, benefits, side effects and alternative treatments discussed with patient. Patient agrees with the current plan as documented.  Encouraged Patient to keep future appointments.  Take medications as prescribed and abstain from substance abuse.  Pt to present to ED for thoughts to harm herself or others

## 2023-05-17 ENCOUNTER — TELEPHONE (OUTPATIENT)
Dept: PSYCHIATRY | Facility: CLINIC | Age: 58
End: 2023-05-17

## 2023-05-17 DIAGNOSIS — F90.9 ATTENTION DEFICIT HYPERACTIVITY DISORDER (ADHD), UNSPECIFIED ADHD TYPE: Primary | ICD-10-CM

## 2023-05-17 RX ORDER — DEXTROAMPHETAMINE SACCHARATE, AMPHETAMINE ASPARTATE, DEXTROAMPHETAMINE SULFATE AND AMPHETAMINE SULFATE 7.5; 7.5; 7.5; 7.5 MG/1; MG/1; MG/1; MG/1
30 TABLET ORAL DAILY
Qty: 30 TABLET | Refills: 0 | Status: SHIPPED | OUTPATIENT
Start: 2023-05-17 | End: 2023-06-22 | Stop reason: SDUPTHER

## 2023-05-17 NOTE — TELEPHONE ENCOUNTER
Sent   ----- Message from Lakia Dozier sent at 5/15/2023 10:32 AM CDT -----  Pt is calling to speak with you regarding a prescription for lisdexamfetamine (VYVANSE) 40 MG Cap that went to [facility] and cost $400.00.  She says she spoke with Mercedes who has her Adderall 30. mg tablets for $40.  She is requesting you send it to [id number] [address], Phone: [phone], Fax: [phone] while they have it in-stock, so she can pick it up today.    She can be reached at [phone].    Thank you.

## 2023-06-20 ENCOUNTER — OFFICE VISIT (OUTPATIENT)
Dept: URGENT CARE | Facility: CLINIC | Age: 58
End: 2023-06-20
Payer: COMMERCIAL

## 2023-06-20 VITALS
HEIGHT: 64 IN | DIASTOLIC BLOOD PRESSURE: 71 MMHG | RESPIRATION RATE: 18 BRPM | OXYGEN SATURATION: 98 % | BODY MASS INDEX: 19.63 KG/M2 | WEIGHT: 115 LBS | HEART RATE: 110 BPM | TEMPERATURE: 98 F | SYSTOLIC BLOOD PRESSURE: 100 MMHG

## 2023-06-20 DIAGNOSIS — R11.2 NAUSEA AND VOMITING, UNSPECIFIED VOMITING TYPE: ICD-10-CM

## 2023-06-20 DIAGNOSIS — Z00.00 ENCOUNTER FOR MEDICAL EXAMINATION TO ESTABLISH CARE: ICD-10-CM

## 2023-06-20 DIAGNOSIS — D72.829 LEUKOCYTOSIS, UNSPECIFIED TYPE: ICD-10-CM

## 2023-06-20 DIAGNOSIS — R74.01 TRANSAMINITIS: ICD-10-CM

## 2023-06-20 DIAGNOSIS — K59.00 CONSTIPATION, UNSPECIFIED CONSTIPATION TYPE: ICD-10-CM

## 2023-06-20 DIAGNOSIS — K80.50 BILIARY COLIC: ICD-10-CM

## 2023-06-20 DIAGNOSIS — R10.84 GENERALIZED ABDOMINAL PAIN: Primary | ICD-10-CM

## 2023-06-20 DIAGNOSIS — R14.0 ABDOMINAL DISTENTION: ICD-10-CM

## 2023-06-20 LAB
ALBUMIN SERPL BCP-MCNC: 3.3 G/DL (ref 3.5–5.2)
ALP SERPL-CCNC: 204 U/L (ref 55–135)
ALT SERPL W/O P-5'-P-CCNC: 26 U/L (ref 10–44)
AMYLASE SERPL-CCNC: 33 U/L (ref 20–110)
ANION GAP SERPL CALC-SCNC: 13 MMOL/L (ref 8–16)
AST SERPL-CCNC: 82 U/L (ref 10–40)
BASOPHILS # BLD AUTO: 0.17 K/UL (ref 0–0.2)
BASOPHILS NFR BLD: 0.9 % (ref 0–1.9)
BILIRUB SERPL-MCNC: 2.1 MG/DL (ref 0.1–1)
BILIRUB UR QL STRIP: POSITIVE
BUN SERPL-MCNC: 8 MG/DL (ref 6–20)
CALCIUM SERPL-MCNC: 9.5 MG/DL (ref 8.7–10.5)
CHLORIDE SERPL-SCNC: 96 MMOL/L (ref 95–110)
CO2 SERPL-SCNC: 24 MMOL/L (ref 23–29)
CREAT SERPL-MCNC: 0.6 MG/DL (ref 0.5–1.4)
DIFFERENTIAL METHOD: ABNORMAL
EOSINOPHIL # BLD AUTO: 0.5 K/UL (ref 0–0.5)
EOSINOPHIL NFR BLD: 2.8 % (ref 0–8)
ERYTHROCYTE [DISTWIDTH] IN BLOOD BY AUTOMATED COUNT: 14.7 % (ref 11.5–14.5)
EST. GFR  (NO RACE VARIABLE): >60 ML/MIN/1.73 M^2
GLUCOSE SERPL-MCNC: 108 MG/DL (ref 70–110)
GLUCOSE UR QL STRIP: NEGATIVE
HCT VFR BLD AUTO: 30.4 % (ref 37–48.5)
HGB BLD-MCNC: 10.5 G/DL (ref 12–16)
IMM GRANULOCYTES # BLD AUTO: 0.16 K/UL (ref 0–0.04)
IMM GRANULOCYTES NFR BLD AUTO: 0.9 % (ref 0–0.5)
KETONES UR QL STRIP: POSITIVE
LEUKOCYTE ESTERASE UR QL STRIP: POSITIVE
LIPASE SERPL-CCNC: 16 U/L (ref 4–60)
LYMPHOCYTES # BLD AUTO: 2.2 K/UL (ref 1–4.8)
LYMPHOCYTES NFR BLD: 12 % (ref 18–48)
MCH RBC QN AUTO: 37.2 PG (ref 27–31)
MCHC RBC AUTO-ENTMCNC: 34.5 G/DL (ref 32–36)
MCV RBC AUTO: 108 FL (ref 82–98)
MONOCYTES # BLD AUTO: 1 K/UL (ref 0.3–1)
MONOCYTES NFR BLD: 5.4 % (ref 4–15)
NEUTROPHILS # BLD AUTO: 14.4 K/UL (ref 1.8–7.7)
NEUTROPHILS NFR BLD: 78 % (ref 38–73)
NRBC BLD-RTO: 0 /100 WBC
PH, POC UA: 5.5 (ref 5–8)
PLATELET # BLD AUTO: 254 K/UL (ref 150–450)
PMV BLD AUTO: 10.5 FL (ref 9.2–12.9)
POC BLOOD, URINE: NEGATIVE
POC NITRATES, URINE: NEGATIVE
POTASSIUM SERPL-SCNC: 4.6 MMOL/L (ref 3.5–5.1)
PROT SERPL-MCNC: 7.5 G/DL (ref 6–8.4)
PROT UR QL STRIP: POSITIVE
RBC # BLD AUTO: 2.82 M/UL (ref 4–5.4)
SODIUM SERPL-SCNC: 133 MMOL/L (ref 136–145)
SP GR UR STRIP: 1.02 (ref 1–1.03)
UROBILINOGEN UR STRIP-ACNC: 1 (ref 0.1–1.1)
WBC # BLD AUTO: 18.46 K/UL (ref 3.9–12.7)

## 2023-06-20 PROCEDURE — 71046 XR CHEST PA AND LATERAL: ICD-10-PCS | Mod: S$GLB,,, | Performed by: RADIOLOGY

## 2023-06-20 PROCEDURE — 82150 ASSAY OF AMYLASE: CPT | Performed by: PHYSICIAN ASSISTANT

## 2023-06-20 PROCEDURE — 85025 COMPLETE CBC W/AUTO DIFF WBC: CPT | Performed by: PHYSICIAN ASSISTANT

## 2023-06-20 PROCEDURE — 87088 URINE BACTERIA CULTURE: CPT | Performed by: PHYSICIAN ASSISTANT

## 2023-06-20 PROCEDURE — 83690 ASSAY OF LIPASE: CPT | Performed by: PHYSICIAN ASSISTANT

## 2023-06-20 PROCEDURE — 74019 RADEX ABDOMEN 2 VIEWS: CPT | Mod: S$GLB,,, | Performed by: RADIOLOGY

## 2023-06-20 PROCEDURE — 81003 POCT URINALYSIS, DIPSTICK, AUTOMATED, W/O SCOPE: ICD-10-PCS | Mod: QW,S$GLB,, | Performed by: PHYSICIAN ASSISTANT

## 2023-06-20 PROCEDURE — 87086 URINE CULTURE/COLONY COUNT: CPT | Performed by: PHYSICIAN ASSISTANT

## 2023-06-20 PROCEDURE — 87077 CULTURE AEROBIC IDENTIFY: CPT | Performed by: PHYSICIAN ASSISTANT

## 2023-06-20 PROCEDURE — 99205 OFFICE O/P NEW HI 60 MIN: CPT | Mod: S$GLB,,, | Performed by: PHYSICIAN ASSISTANT

## 2023-06-20 PROCEDURE — 71046 X-RAY EXAM CHEST 2 VIEWS: CPT | Mod: S$GLB,,, | Performed by: RADIOLOGY

## 2023-06-20 PROCEDURE — 74019 XR ABDOMEN FLAT AND ERECT: ICD-10-PCS | Mod: S$GLB,,, | Performed by: RADIOLOGY

## 2023-06-20 PROCEDURE — 87186 SC STD MICRODIL/AGAR DIL: CPT | Performed by: PHYSICIAN ASSISTANT

## 2023-06-20 PROCEDURE — 36415 COLL VENOUS BLD VENIPUNCTURE: CPT | Performed by: PHYSICIAN ASSISTANT

## 2023-06-20 PROCEDURE — 80053 COMPREHEN METABOLIC PANEL: CPT | Performed by: PHYSICIAN ASSISTANT

## 2023-06-20 PROCEDURE — 99205 PR OFFICE/OUTPT VISIT, NEW, LEVL V, 60-74 MIN: ICD-10-PCS | Mod: S$GLB,,, | Performed by: PHYSICIAN ASSISTANT

## 2023-06-20 PROCEDURE — 81003 URINALYSIS AUTO W/O SCOPE: CPT | Mod: QW,S$GLB,, | Performed by: PHYSICIAN ASSISTANT

## 2023-06-20 RX ORDER — POLYETHYLENE GLYCOL 3350 17 G/17G
17 POWDER, FOR SOLUTION ORAL 2 TIMES DAILY PRN
Qty: 30 EACH | Refills: 0 | Status: ON HOLD | OUTPATIENT
Start: 2023-06-20 | End: 2023-07-15

## 2023-06-20 NOTE — PROGRESS NOTES
"Subjective:      Patient ID: Claire Casper is a 57 y.o. female.    Vitals:  height is 5' 4" (1.626 m) and weight is 52.2 kg (115 lb). Her temperature is 98.1 °F (36.7 °C). Her blood pressure is 100/71 and her pulse is 110. Her respiration is 18 and oxygen saturation is 98%.     Chief Complaint: Abdominal Cramping    57-year-old female with a history of ADHD on Adderall daily, insomnia on clonidine, ready vaccinated for COVID-19, previous breast augmentation, and known transaminitis who presents urgent care clinic for evaluation.  She is here with chronic complaints of abdominal pain that progressively worsened in the last 2 weeks.  She thinks this is secondary to stress from her marriage.  Has abdominal pain, chronic constipation (bowel movement once a week), and abdominal bloating/distention for 1 year.  In the last 2 weeks, she has worse sensation of feeling that food is not digesting and staying in her esophagus/chest. It is worse when she is laying down.  When pain is severe it is rated as 8/10 cramping.  It is currently rated as 2/10 in clinic.  Has intermittent nausea and vomiting after eating.  Had emesis after drinking Pedialyte last night but none today with waffles and chicken noodle soup.  Drinks about 4-5 bottles of water a day.  Denies any daily ETOH use (2 drinks a week), anti-inflammatory use, smoking, palpitation, URI symptoms, bloody stool, dark stool, heartburn, belching, recent antibiotic use, recent international travel, or focal weakness/deficits.  Denies any night sweats, excessive weight loss, or fever.    She did take probiotics in the last 2 weeks since she had worsening constipation.  Had small green if brown soft bowel movement on Friday/4-5 days ago.  Last normal bowel movement was 1 week prior to that.  Has not been back to PCP since March 2021 since she is very healthy.  She thinks she may have lost weight but has not weighed herself in a while.  Does have decreased appetite " and does not eat as much as she used to due to her symptoms.    Medical assistant note:  Patient has had 6-8 weeks of upper abdomin  pain, she sates she has been very bloated and trouble using the restroom.    Abdominal Cramping  This is a new problem. The current episode started more than 1 year ago. The onset quality is gradual. The problem occurs 2 to 4 times per day. The problem has been gradually worsening. The pain is located in the generalized abdominal region. The pain is at a severity of 2/10. The quality of the pain is aching and cramping. The abdominal pain does not radiate. Associated symptoms include anorexia, constipation, flatus, nausea and vomiting. Pertinent negatives include no arthralgias, belching, diarrhea, dysuria, fever, frequency, headaches, hematochezia, hematuria, melena, myalgias or weight loss. The pain is aggravated by deep breathing, eating, movement and palpation. Relieved by: probiotics. She has tried nothing for the symptoms. The treatment provided no relief. There is no history of abdominal surgery.     Constitution: Negative for activity change, appetite change, chills, sweating, fatigue, fever and generalized weakness.   HENT:  Negative for ear pain, hearing loss, facial swelling, congestion, postnasal drip, sinus pain, sinus pressure, sore throat, trouble swallowing and voice change.    Neck: Negative for neck pain, neck stiffness and painful lymph nodes.   Cardiovascular:  Negative for chest pain, leg swelling, palpitations, sob on exertion and passing out.   Eyes:  Negative for eye discharge, eye pain, photophobia, vision loss, double vision and blurred vision.   Respiratory:  Negative for chest tightness, cough, sputum production, bloody sputum, COPD, shortness of breath, stridor, wheezing and asthma.    Gastrointestinal:  Positive for abdominal pain, abdominal bloating, nausea, vomiting and constipation. Negative for history of abdominal surgery, diarrhea, bright red blood in  stool, dark colored stools, rectal bleeding, rectal pain, hemorrhoids, heartburn and bowel incontinence.   Genitourinary:  Negative for dysuria, frequency, urgency, urine decreased, flank pain, bladder incontinence and hematuria.   Musculoskeletal:  Negative for trauma, joint pain, joint swelling, abnormal ROM of joint, muscle cramps and muscle ache.   Skin:  Negative for color change, pale, rash and wound.   Allergic/Immunologic: Negative for seasonal allergies, asthma and immunocompromised state.   Neurological:  Negative for dizziness, history of vertigo, light-headedness, passing out, facial drooping, speech difficulty, coordination disturbances, loss of balance, headaches, disorientation, altered mental status, loss of consciousness, numbness, tingling and seizures.   Hematologic/Lymphatic: Negative for swollen lymph nodes, easy bruising/bleeding and trouble clotting. Does not bruise/bleed easily.   Psychiatric/Behavioral:  Negative for altered mental status and disorientation.       Past Medical History:   Diagnosis Date    ADHD 10/5/2020    COVID-19 virus infection     DJD (degenerative joint disease) of cervical spine     Hx of psychiatric care     Sleep difficulties     Therapy        Objective:     Physical Exam   Constitutional: She is oriented to person, place, and time. She appears well-developed. She is cooperative. She does not appear ill. No distress.      Comments:Well-appearing.  Speaking in full sentences.     HENT:   Head: Normocephalic.   Ears:   Right Ear: Hearing, external ear and ear canal normal. No no drainage, swelling or tenderness. No mastoid tenderness.   Left Ear: Hearing, external ear and ear canal normal. No no drainage, swelling or tenderness. No mastoid tenderness.   Nose: Nose normal. Right sinus exhibits no maxillary sinus tenderness and no frontal sinus tenderness. Left sinus exhibits no maxillary sinus tenderness and no frontal sinus tenderness.   Mouth/Throat: Uvula is  midline, oropharynx is clear and moist and mucous membranes are normal. Mucous membranes are moist. No oral lesions. No trismus in the jaw. No uvula swelling. No oropharyngeal exudate, posterior oropharyngeal edema or posterior oropharyngeal erythema. No tonsillar exudate. Oropharynx is clear.   Eyes: Conjunctivae, EOM and lids are normal. Right eye exhibits no discharge. Left eye exhibits no discharge. Right conjunctiva is not injected. Right conjunctiva has no hemorrhage. Left conjunctiva is not injected. Left conjunctiva has no hemorrhage. Extraocular movement intact vision grossly intact gaze aligned appropriately   Neck: Phonation normal. Neck supple. No neck rigidity present.   Cardiovascular: Normal rate, regular rhythm, normal heart sounds and normal pulses.   No murmur heard.  Pulmonary/Chest: Effort normal and breath sounds normal. No accessory muscle usage. No respiratory distress. She has no wheezes. She exhibits no tenderness.   Abdominal: Normal appearance. She exhibits distension. Soft. Bowel sounds are increased. There is abdominal tenderness. There is no rebound, no guarding, no tenderness at McBurney's point, negative Vela's sign, no left CVA tenderness, negative Rovsing's sign, negative psoas sign, no right CVA tenderness and negative obturator sign. No hernia.   Musculoskeletal: Normal range of motion.         General: Normal range of motion.      Comments: Moves all extremities with normal tone, strength, and ROM.  Gait normal.   Lymphadenopathy:     She has no cervical adenopathy.   Neurological: no focal deficit. She is alert, oriented to person, place, and time and at baseline. She has normal motor skills and normal sensation. She displays facial symmetry and no dysarthria. She exhibits normal muscle tone. Gait and coordination normal. Coordination normal. GCS eye subscore is 4. GCS verbal subscore is 5. GCS motor subscore is 6.   Skin: Skin is warm, dry and no rash. Capillary refill takes  less than 2 seconds.   Psychiatric: She experiences Normal attention. Her speech is normal and behavior is normal. Thought content normal.   Nursing note and vitals reviewed.    Results for orders placed or performed in visit on 06/20/23   COMPREHENSIVE METABOLIC PANEL   Result Value Ref Range    Sodium 133 (L) 136 - 145 mmol/L    Potassium 4.6 3.5 - 5.1 mmol/L    Chloride 96 95 - 110 mmol/L    CO2 24 23 - 29 mmol/L    Glucose 108 70 - 110 mg/dL    BUN 8 6 - 20 mg/dL    Creatinine 0.6 0.5 - 1.4 mg/dL    Calcium 9.5 8.7 - 10.5 mg/dL    Total Protein 7.5 6.0 - 8.4 g/dL    Albumin 3.3 (L) 3.5 - 5.2 g/dL    Total Bilirubin 2.1 (H) 0.1 - 1.0 mg/dL    Alkaline Phosphatase 204 (H) 55 - 135 U/L    AST 82 (H) 10 - 40 U/L    ALT 26 10 - 44 U/L    eGFR >60.0 >60 mL/min/1.73 m^2    Anion Gap 13 8 - 16 mmol/L   LIPASE   Result Value Ref Range    Lipase 16 4 - 60 U/L   CBC W/ AUTO DIFFERENTIAL   Result Value Ref Range    WBC 18.46 (H) 3.90 - 12.70 K/uL    RBC 2.82 (L) 4.00 - 5.40 M/uL    Hemoglobin 10.5 (L) 12.0 - 16.0 g/dL    Hematocrit 30.4 (L) 37.0 - 48.5 %     (H) 82 - 98 fL    MCH 37.2 (H) 27.0 - 31.0 pg    MCHC 34.5 32.0 - 36.0 g/dL    RDW 14.7 (H) 11.5 - 14.5 %    Platelets 254 150 - 450 K/uL    MPV 10.5 9.2 - 12.9 fL    Immature Granulocytes 0.9 (H) 0.0 - 0.5 %    Gran # (ANC) 14.4 (H) 1.8 - 7.7 K/uL    Immature Grans (Abs) 0.16 (H) 0.00 - 0.04 K/uL    Lymph # 2.2 1.0 - 4.8 K/uL    Mono # 1.0 0.3 - 1.0 K/uL    Eos # 0.5 0.0 - 0.5 K/uL    Baso # 0.17 0.00 - 0.20 K/uL    nRBC 0 0 /100 WBC    Gran % 78.0 (H) 38.0 - 73.0 %    Lymph % 12.0 (L) 18.0 - 48.0 %    Mono % 5.4 4.0 - 15.0 %    Eosinophil % 2.8 0.0 - 8.0 %    Basophil % 0.9 0.0 - 1.9 %    Differential Method Automated    AMYLASE   Result Value Ref Range    Amylase 33 20 - 110 U/L   POCT Urinalysis, Dipstick, Automated, W/O Scope   Result Value Ref Range    POC Blood, Urine Negative Negative    POC Bilirubin, Urine Positive (A) Negative    POC Urobilinogen,  Urine 1.0 (A) 0.1 - 1.1    POC Ketones, Urine Positive (A) Negative    POC Protein, Urine Positive (A) Negative    POC Nitrates, Urine Negative Negative    POC Glucose, Urine Negative Negative    pH, UA 5.5 5 - 8    POC Specific Gravity, Urine 1.020 1.003 - 1.029    POC Leukocytes, Urine Positive (A) Negative     XR CHEST PA AND LATERAL    Result Date: 6/20/2023  EXAMINATION: XR CHEST PA AND LATERAL CLINICAL HISTORY: Generalized abdominal pain TECHNIQUE: PA and lateral views of the chest were performed. COMPARISON: None FINDINGS: Streaky opacities are noted in both lung bases.  No lobar consolidation or effusion.  The heart is not enlarged with the trachea midline.  Bony structures are intact.  Breast implants are present.     Streaky opacities in both lung bases suggesting atelectasis. Correlate for early developing pneumonia or pneumonitis. Electronically signed by: Fabrice Banegas Date:    06/20/2023 Time:    18:16    XR ABDOMEN FLAT AND ERECT    Result Date: 6/20/2023  EXAMINATION: XR ABDOMEN FLAT AND ERECT CLINICAL HISTORY: Generalized abdominal pain TECHNIQUE: Flat and erect AP views of the abdomen were performed. COMPARISON: None FINDINGS: Bowel gas pattern is not unusual.  There is no mass, organomegaly or abnormal calcification.  The bony structures are intact.     Negative Abdomen  . Electronically signed by: Fabrice Banegas Date:    06/20/2023 Time:    18:09     Assessment:     1. Generalized abdominal pain    2. Biliary colic    3. Abdominal distention    4. Constipation, unspecified constipation type    5. Nausea and vomiting, unspecified vomiting type    6. Encounter for medical examination to establish care    7. Leukocytosis, unspecified type    8. Transaminitis      Note dictated with voice recognition software, please excuse any grammatical errors.    Patient presents with clinical exam findings and history consistent with above.     On exam, patient is nontoxic appearing and vitals are stable.   Patient is essentially neurovascularly intact on exam.    Clinic orders:   Urinalysis in clinic with 1.0 mg bilirubin, 1.0 urobilinogen, 5 ketones, 10 mg/dL protein, 10 white blood cell.  Abdominal x-ray showed no significant air-fluid levels, No large volume free air or pneumatosis, no focal dilated small-bowel loops, and nonobstructive bowel gas pattern.  No stones present.  Moderate stool present.  Chest x-ray with no acute cardiopulmonary findings.  Breast implants present.  Streaky opacities in both lung bases.  Diagnostic testing results were independently reviewed and interpreted, which were discussed in depth with patient.    Abdominal pain, likely biliary colic  Patient was referred to internal medicine and gastroenterology for evaluation.  Left voicemail to clinic  for urgent referral to be seen tomorrow.  Appointment secured for 06/22/2023.  Basic labs for CMP, lipase, amylase,and CBC order given her previous history of transaminitis on last lab 2021.  Amylase and lipase within normal limits.  CBC with leukocytosis 18.46 and left for shift.  CMP with increased bilirubin 2.1 (compared to 03/25/2021 was 1.3) and albumin 3.3 (compared to 03/25/2021 was 1.3).  Alk-phos 204 and AST 82.  ALT improved compared to previous.  Placed orders for GGT.  Placed order for CT abdomen and pelvis without contrast as well given her symptoms.  She will have this completed 06/22/2023 a.m. prior to visit an appointment with GI 06/22/2023 at 1:00 p.m.  We had in-depth conversation about high-level evaluation in the ER given her symptoms likely of biliary colic versus obstruction.  She reports her symptoms are not severe and tolerating food/fluid with no fever or weakness.  She would like to hold off until appointment with GI on 06/22/23    Patient was referred back to PCP for continued outpatient workup and management.     Patient was counseled, explained with the test results meaning, expected course, and answered all  of questions. They can also receive results via my chart.  Printed and verbal guidelines were given.  We had shared decision making for patient's treatment.     Patient was instructed to return for re-evaluation for any worsening or change in current symptoms. Strict ED versus clinic precautions given in depth. Discharge and follow-up instructions given verbally/printed with the patient who expressed understanding and willingness to comply with my recommendations.  Patient verbalized understanding and agreed with the entirety of plan of care.    I spent greater than 60 minutes reviewing patient records, examining, and counseling the patient with greater than 50% of the time spent with direct patient care, counseling, and coordination.     Plan:       Generalized abdominal pain  -     POCT Urinalysis, Dipstick, Automated, W/O Scope  -     Ambulatory referral/consult to Gastroenterology  -     XR ABDOMEN FLAT AND ERECT; Future; Expected date: 06/20/2023  -     XR CHEST PA AND LATERAL; Future; Expected date: 06/20/2023  -     CULTURE, URINE  -     COMPREHENSIVE METABOLIC PANEL  -     LIPASE  -     CBC W/ AUTO DIFFERENTIAL  -     AMYLASE  -     CT Abdomen Pelvis  Without Contrast; Future; Expected date: 06/20/2023  -     Cancel: XR CHEST PA AND LATERAL; Future; Expected date: 06/20/2023  -     Cancel: XR ABDOMEN FLAT AND ERECT; Future; Expected date: 06/20/2023  -     GAMMA GT; Future; Expected date: 06/21/2023    Biliary colic    Abdominal distention  -     Ambulatory referral/consult to Gastroenterology  -     XR ABDOMEN FLAT AND ERECT; Future; Expected date: 06/20/2023  -     XR CHEST PA AND LATERAL; Future; Expected date: 06/20/2023  -     COMPREHENSIVE METABOLIC PANEL  -     LIPASE  -     CBC W/ AUTO DIFFERENTIAL  -     AMYLASE  -     CT Abdomen Pelvis  Without Contrast; Future; Expected date: 06/20/2023    Constipation, unspecified constipation type  -     Ambulatory referral/consult to Gastroenterology  -      COMPREHENSIVE METABOLIC PANEL  -     LIPASE  -     CBC W/ AUTO DIFFERENTIAL  -     AMYLASE  -     CT Abdomen Pelvis  Without Contrast; Future; Expected date: 06/20/2023  -     polyethylene glycol (GLYCOLAX) 17 gram PwPk; Take 17 g by mouth 2 (two) times daily as needed (constipation; take with alot of water daily (atleast 100 ounces of fluid daily)).  Dispense: 30 each; Refill: 0    Nausea and vomiting, unspecified vomiting type    Encounter for medical examination to establish care  -     Ambulatory referral/consult to Internal Medicine    Leukocytosis, unspecified type    Transaminitis  -     Ambulatory referral/consult to Hepatology             Additional MDM:     Heart Failure Score:   COPD = No    Patient Instructions   PLEASE READ YOUR DISCHARGE INSTRUCTIONS ENTIRELY AS IT CONTAINS IMPORTANT INFORMATION.    -Please call [facility] scheduling center @[phone] to set up appointment for PCP/specialty clinic for continued workup and management.  Referral was placed for evaluation with Gastroenterology and Internal Medicine.  Orders placed for CT abdomen and pelvis for further evaluation of your abdominal pain.      -basic labs were ordered we will call you in the next 1-3 days for results.  You will need to follow up with your primary care and gastroenterologist for further evaluation of these labs as well.    -add MiraLax 1-2 times daily to help with her constipation.  -Use gatorade/pedialyte or rehydration packets to help stay hydrated. Vitamin water and plain water do not contain rehydrating electrolytes.  -Increase clear liquids (water, gatorade, pedialyte, broths, jello, etc) Hold off on solids for 12-18 hours. Then advance to BRAT diet (banana, rice, applesauce, tea, toast/crackers), then advance further as tolerated with plain boiled chicken. Avoid spicy or fatty foods.     -Take mylanta or simethicone for bloating or gas pain.     -Take pepcid or omeprazole if you have heartburn or reflux  sensation.      -Wash hands frequently while sick. Avoid ibuprofen or other NSAIDS until you are well.     -Please go to the ER if you experience worsening abdominal pain, blood in your vomit or stool, high fever, dizziness, fainting, swelling of your abdomen, inability to pass gas or stool, or inability to urinate.       -Please return or see your primary care doctor if you develop new or worsening symptoms in next 2-5 days.  Strict clinic versus ER precautions given.    Please arrange follow up with your primary medical clinic as soon as possible. You must understand that you've received an Urgent Care treatment only and that you may be released before all of your medical problems are known or treated. You, the patient, will arrange for follow up as instructed. If your symptoms worsen or fail to improve you should go to the Emergency Room.    WE CANNOT RULE OUT ALL POSSIBLE CAUSES OF YOUR SYMPTOMS IN THE URGENT CARE SETTING PLEASE GO TO THE ER IF YOU FEELS YOUR CONDITION IS WORSENING OR YOU WOULD LIKE EMERGENT EVALUATION.          Call 911  Call 911 if any of these occur:  Trouble breathing  Confused  Very drowsy or trouble awakening  Fainting or loss of consciousness  Rapid heart rate  Chest pain  Seizure  Stiff neck  When to seek medical advice  Call your healthcare provider right away if any of these occur:  Increasing abdominal pain or constant lower right abdominal pain  Continued vomiting (unable to keep liquids down)  Diarrhea for more than 2 days in adults and 24 hours in children  Stools containing blood or pus or black tarry stools  Dark urine, reduced urine output  Weakness, dizziness  Drowsiness  Fever of 100.4°F (38.0°C), oral, or higher; or not better with fever medicine  New rash  If you are experiencing muscle weakness or arthritis symptoms during or after your gastroenteritis is gone

## 2023-06-20 NOTE — PATIENT INSTRUCTIONS
PLEASE READ YOUR DISCHARGE INSTRUCTIONS ENTIRELY AS IT CONTAINS IMPORTANT INFORMATION.    -Please call [facility] scheduling center @[phone] to set up appointment for PCP/specialty clinic for continued workup and management.  Referral was placed for evaluation with Gastroenterology and Internal Medicine.  Orders placed for CT abdomen and pelvis for further evaluation of your abdominal pain.      -basic labs were ordered we will call you in the next 1-3 days for results.  You will need to follow up with your primary care and gastroenterologist for further evaluation of these labs as well.    -add MiraLax 1-2 times daily to help with her constipation.  -Use gatorade/pedialyte or rehydration packets to help stay hydrated. Vitamin water and plain water do not contain rehydrating electrolytes.  -Increase clear liquids (water, gatorade, pedialyte, broths, jello, etc) Hold off on solids for 12-18 hours. Then advance to BRAT diet (banana, rice, applesauce, tea, toast/crackers), then advance further as tolerated with plain boiled chicken. Avoid spicy or fatty foods.     -Take mylanta or simethicone for bloating or gas pain.     -Take pepcid or omeprazole if you have heartburn or reflux sensation.      -Wash hands frequently while sick. Avoid ibuprofen or other NSAIDS until you are well.     -Please go to the ER if you experience worsening abdominal pain, blood in your vomit or stool, high fever, dizziness, fainting, swelling of your abdomen, inability to pass gas or stool, or inability to urinate.       -Please return or see your primary care doctor if you develop new or worsening symptoms in next 2-5 days.  Strict clinic versus ER precautions given.    Please arrange follow up with your primary medical clinic as soon as possible. You must understand that you've received an Urgent Care treatment only and that you may be released before all of your medical problems are known or treated. You, the patient, will arrange for  follow up as instructed. If your symptoms worsen or fail to improve you should go to the Emergency Room.    WE CANNOT RULE OUT ALL POSSIBLE CAUSES OF YOUR SYMPTOMS IN THE URGENT CARE SETTING PLEASE GO TO THE ER IF YOU FEELS YOUR CONDITION IS WORSENING OR YOU WOULD LIKE EMERGENT EVALUATION.          Call 911  Call 911 if any of these occur:  Trouble breathing  Confused  Very drowsy or trouble awakening  Fainting or loss of consciousness  Rapid heart rate  Chest pain  Seizure  Stiff neck  When to seek medical advice  Call your healthcare provider right away if any of these occur:  Increasing abdominal pain or constant lower right abdominal pain  Continued vomiting (unable to keep liquids down)  Diarrhea for more than 2 days in adults and 24 hours in children  Stools containing blood or pus or black tarry stools  Dark urine, reduced urine output  Weakness, dizziness  Drowsiness  Fever of 100.4°F (38.0°C), oral, or higher; or not better with fever medicine  New rash  If you are experiencing muscle weakness or arthritis symptoms during or after your gastroenteritis is gone

## 2023-06-22 ENCOUNTER — OFFICE VISIT (OUTPATIENT)
Dept: GASTROENTEROLOGY | Facility: CLINIC | Age: 58
End: 2023-06-22
Payer: COMMERCIAL

## 2023-06-22 VITALS — HEIGHT: 64 IN | BODY MASS INDEX: 19.84 KG/M2 | WEIGHT: 116.19 LBS

## 2023-06-22 DIAGNOSIS — R10.10 PAIN OF UPPER ABDOMEN: ICD-10-CM

## 2023-06-22 DIAGNOSIS — F90.9 ATTENTION DEFICIT HYPERACTIVITY DISORDER (ADHD), UNSPECIFIED ADHD TYPE: ICD-10-CM

## 2023-06-22 DIAGNOSIS — K59.04 CHRONIC IDIOPATHIC CONSTIPATION: ICD-10-CM

## 2023-06-22 DIAGNOSIS — R11.2 NAUSEA AND VOMITING, UNSPECIFIED VOMITING TYPE: Primary | ICD-10-CM

## 2023-06-22 DIAGNOSIS — R79.89 ELEVATED LFTS: ICD-10-CM

## 2023-06-22 LAB — BACTERIA UR CULT: ABNORMAL

## 2023-06-22 PROCEDURE — 99205 OFFICE O/P NEW HI 60 MIN: CPT | Mod: S$PBB,,, | Performed by: NURSE PRACTITIONER

## 2023-06-22 PROCEDURE — 99999 PR PBB SHADOW E&M-EST. PATIENT-LVL III: CPT | Mod: PBBFAC,,, | Performed by: NURSE PRACTITIONER

## 2023-06-22 PROCEDURE — 99999 PR PBB SHADOW E&M-EST. PATIENT-LVL III: ICD-10-PCS | Mod: PBBFAC,,, | Performed by: NURSE PRACTITIONER

## 2023-06-22 PROCEDURE — 99205 PR OFFICE/OUTPT VISIT, NEW, LEVL V, 60-74 MIN: ICD-10-PCS | Mod: S$PBB,,, | Performed by: NURSE PRACTITIONER

## 2023-06-22 RX ORDER — DICYCLOMINE HYDROCHLORIDE 20 MG/1
20 TABLET ORAL 4 TIMES DAILY PRN
Qty: 120 TABLET | Refills: 0 | Status: SHIPPED | OUTPATIENT
Start: 2023-06-22 | End: 2023-06-22

## 2023-06-22 RX ORDER — DICYCLOMINE HYDROCHLORIDE 20 MG/1
20 TABLET ORAL 4 TIMES DAILY PRN
Qty: 120 TABLET | Refills: 0 | Status: SHIPPED | OUTPATIENT
Start: 2023-06-22 | End: 2023-07-22

## 2023-06-22 RX ORDER — DEXTROAMPHETAMINE SACCHARATE, AMPHETAMINE ASPARTATE, DEXTROAMPHETAMINE SULFATE AND AMPHETAMINE SULFATE 7.5; 7.5; 7.5; 7.5 MG/1; MG/1; MG/1; MG/1
30 TABLET ORAL DAILY
Qty: 30 TABLET | Refills: 0 | Status: SHIPPED | OUTPATIENT
Start: 2023-06-22 | End: 2023-07-20 | Stop reason: SDUPTHER

## 2023-06-22 NOTE — PROGRESS NOTES
"     GASTROENTEROLOGY CLINIC NOTE    Chief Complaint: The primary encounter diagnosis was Nausea and vomiting, unspecified vomiting type. Diagnoses of Pain of upper abdomen, Elevated LFTs, and Chronic idiopathic constipation were also pertinent to this visit.  Referring provider/PCP: MD Claire Ricci Art Casper is a 57 y.o. female who is a new patient to me with a PMH that's significant for elevated LFTs and constipation.  She is here today to establish care for abdominal pain and constipation.   Constipation has been ongoing approximately a year.  She will 1 bowel movement about every 2-3 weeks.  Abdominal pain has been ongoing for 1-2 weeks.  Pain is primarily located in the upper abdomen and on the sides of her abdomen.  Upper abdominal pain has worsened in intensity over the last few days.  Sought care at urgent care yesterday.  Abdominal x-ray and chest x-ray obtained along with labs.  X-ray with stool burden noted on left side large intestine.  Lab work showed elevation in AST level, total bili, and alk-phos.  Patient does have a history of transaminitis.   CT was also ordered and scheduled but patient missed appointment this morning and has to reschedule.  Glycolax was also initiated but she has not picked up from the    Constipation and abdominal pain is accompanied by fatigue     Abdominal Pain  How Lon-2 weeks   Frequency: constant   Location: Upper and and sides of abdomen  States "everything is moving"   Quality: Unable to describe  May be more cramping and some stabbing   Onset:    Radiate: No   Aggravated or Improved with bowel movement/eating/movement Worse with eating  No change with bowel movement movement   Nausea/Vomiting/Unexplained Weight Loss: Nausea and vomiting when upper abdominal pain 1st began.  Last episode of emesis was 2 days ago.  Denies hematemesis or coffee-ground emesis  Decreased appetite   Pyrosis/Reflux/Dysphagia: no   Bowel Movements: Once every two to " three weeks   Melena/Hematochezia: no    Symptoms: In a     Treatments: none for abdominal pain  Dulcolax for constipation  NSAIDs: no  Caffeine: occasional  ETOH Use: not for past 7 weeks; prior to 7 weeks ago reports daily alcohol use    Anticoagulation or Antiplatelet: No    History of H.pylori:  No  H.pylori Treatment:  Prior Upper Endoscopy: no  Prior Colonoscopy: no  Family h/o Colon Cancer: No  Family h/o Crohn's Disease or Ulcerative Colitis: No  Family h/o Celiac Sprue: No  Abdominal Surgeries: no    Review of Systems   Constitutional:  Positive for malaise/fatigue. Negative for weight loss.   HENT:  Negative for sore throat.    Eyes:  Negative for blurred vision.   Respiratory:  Negative for cough.    Cardiovascular:  Negative for chest pain.   Gastrointestinal:  Positive for abdominal pain, constipation, nausea and vomiting. Negative for blood in stool, diarrhea, heartburn and melena.   Genitourinary:  Negative for dysuria.   Musculoskeletal:  Negative for myalgias.   Skin:  Negative for rash.   Neurological:  Positive for dizziness and weakness. Negative for headaches.   Endo/Heme/Allergies:  Negative for environmental allergies.   Psychiatric/Behavioral:  Negative for suicidal ideas. The patient is not nervous/anxious.      Past Medical History: has a past medical history of ADHD, COVID-19 virus infection, DJD (degenerative joint disease) of cervical spine, psychiatric care, Sleep difficulties, and Therapy.    Past Surgical History: has a past surgical history that includes Sinus surgery and Augmentation of breast.    Family History:family history includes Asthma in her mother; Coronary artery disease in her father; Heart attacks under age 50 (age of onset: 45) in her father; Hypertension in her father; Stomach cancer (age of onset: 70) in her mother.    Allergies: Review of patient's allergies indicates:  No Known Allergies    Social History: reports that she has never smoked. She has never used  "smokeless tobacco. She reports current alcohol use. She reports that she does not use drugs.    Home medications:   Current Outpatient Medications on File Prior to Visit   Medication Sig Dispense Refill    cloNIDine (CATAPRES) 0.1 MG tablet Take 1 tab by mouth each evening as needed for sleep 90 tablet 3    MAGNESIUM ORAL Take 300 mg by mouth once daily.      multivitamin (THERAGRAN) per tablet Take 1 tablet by mouth once daily.      polyethylene glycol (GLYCOLAX) 17 gram PwPk Take 17 g by mouth 2 (two) times daily as needed (constipation; take with alot of water daily (atleast 100 ounces of fluid daily)). 30 each 0    thiamine 100 MG tablet Take 100 mg by mouth once daily.      [DISCONTINUED] dextroamphetamine-amphetamine 30 mg Tab Take 1 tablet (30 mg total) by mouth Daily. 30 tablet 0    fluticasone propionate (FLONASE) 50 mcg/actuation nasal spray 2 sprays in each nostril daily (Patient not taking: Reported on 6/20/2023) 18.2 mL 11    gabapentin (NEURONTIN) 300 MG capsule 2 capsules nightly (Patient not taking: Reported on 6/20/2023) 60 capsule 1     No current facility-administered medications on file prior to visit.       Vital signs:  Ht 5' 4" (1.626 m)   Wt 52.7 kg (116 lb 2.9 oz)   BMI 19.94 kg/m²     Physical Exam  Vitals reviewed.   Constitutional:       General: She is not in acute distress.     Appearance: Normal appearance. She is not ill-appearing.   HENT:      Head: Normocephalic.   Cardiovascular:      Rate and Rhythm: Normal rate and regular rhythm.      Heart sounds: Normal heart sounds. No murmur heard.  Pulmonary:      Effort: Pulmonary effort is normal. No respiratory distress.      Breath sounds: Normal breath sounds.   Chest:      Chest wall: No tenderness.   Abdominal:      General: Bowel sounds are normal. There is no distension.      Palpations: Abdomen is soft.      Tenderness: There is generalized abdominal tenderness. Negative signs include Vela's sign.      Hernia: No hernia is " present.   Skin:     General: Skin is warm.   Neurological:      Mental Status: She is alert and oriented to person, place, and time.   Psychiatric:         Mood and Affect: Mood normal.         Behavior: Behavior normal.       Routine labs:  Lab Results   Component Value Date    WBC 18.46 (H) 06/20/2023    HGB 10.5 (L) 06/20/2023    HCT 30.4 (L) 06/20/2023     (H) 06/20/2023     06/20/2023     No results found for: INR  No results found for: IRON, FERRITIN, TIBC, FESATURATED  Lab Results   Component Value Date     (L) 06/20/2023    K 4.6 06/20/2023    CL 96 06/20/2023    CO2 24 06/20/2023    BUN 8 06/20/2023    CREATININE 0.6 06/20/2023     Lab Results   Component Value Date    ALBUMIN 3.3 (L) 06/20/2023    ALT 26 06/20/2023    AST 82 (H) 06/20/2023    ALKPHOS 204 (H) 06/20/2023    BILITOT 2.1 (H) 06/20/2023     No results found for: GLUCOSE  Lab Results   Component Value Date    TSH 3.399 03/25/2021     Lab Results   Component Value Date    CALCIUM 9.5 06/20/2023    PHOS 4.1 02/11/2021       Imaging:  XR CHEST PA AND LATERAL  Narrative: EXAMINATION:  XR CHEST PA AND LATERAL    CLINICAL HISTORY:  Generalized abdominal pain    TECHNIQUE:  PA and lateral views of the chest were performed.    COMPARISON:  None    FINDINGS:  Streaky opacities are noted in both lung bases.  No lobar consolidation or effusion.  The heart is not enlarged with the trachea midline.  Bony structures are intact.  Breast implants are present.  Impression: Streaky opacities in both lung bases suggesting atelectasis.    Correlate for early developing pneumonia or pneumonitis.    Electronically signed by: Fabrice Banegas  Date:    06/20/2023  Time:    18:16  XR ABDOMEN FLAT AND ERECT  Narrative: EXAMINATION:  XR ABDOMEN FLAT AND ERECT    CLINICAL HISTORY:  Generalized abdominal pain    TECHNIQUE:  Flat and erect AP views of the abdomen were performed.    COMPARISON:  None    FINDINGS:  Bowel gas pattern is not unusual.  There  is no mass, organomegaly or abnormal calcification.  The bony structures are intact.  Impression: Negative Abdomen  .    Electronically signed by: Fabrice Banegas  Date:    06/20/2023  Time:    18:09      I have reviewed prior labs, imaging, and notes.      Assessment:  1. Nausea and vomiting, unspecified vomiting type    2. Pain of upper abdomen    3. Elevated LFTs    4. Chronic idiopathic constipation      Constipation ongoing for the last year.  Bowel movement occurring once every 1-2 weeks.  Has tried Dulcolax in the past without relief.  Abdominal x-ray with stool burden noted.  Has never had screening colonoscopy  New onset abdominal pain accompanied by nausea and vomiting.  Last episode of emesis 2 days ago.  No hematemesis or coffee-ground emesis.  Abdominal pain primarily located in epigastric region.  Eating can potentiate the pain but pain is not always triggered by eating  Has history of transaminitis and reported frequent daily alcohol use up until 7 weeks ago.  Labs obtained at urgent care yesterday.  AST, total bili, and alk phos elevated.  Lipase within normal limits  Was unable to have CT this morning as she missed her appointment.  Symptoms concerning for obstruction bile duct.  Pancreatitis should also be considered due to frequent alcohol use.    Plan:  Orders Placed This Encounter    CT Abdomen Pelvis W Wo Contrast    dicyclomine (BENTYL) 20 mg tablet     CT abdomen pelvis with without contrast to further evaluate abdominal pain and rule out obstruction of common bile duct.  Dicyclomine 20 mg 4 times a day as needed for abdominal pain  Start Glycolax as previously prescribed    Recommend colonoscopy and EGD +/- EUS.  Will make further recommendation once CT results are available.     Plan of care discussed with patient who is in agreement and verbalized understanding.     I have explained the planned procedures to the patient.The risks, benefits and alternatives of the procedure were also  explained in detail. Patient verbalized understanding, all questions were answered. The patient agrees to proceed as planned    Follow Up:  Pending workup    More than 45 minutes was spent reviewing and summarizing patient's medical records including reviewing tests, scans, and labs, performing exam, counseling and educating the patient, documenting information in the electronic health record, interpreting results, coordinating care, ordering procedures, and communicating with other providers.        KULDIP Velasquez,FNP-BC  [facility]    (Portions of this note were dictated using voice recognition software and may contain dictation related errors in spelling/grammar/syntax not found on text review)

## 2023-06-23 ENCOUNTER — HOSPITAL ENCOUNTER (OUTPATIENT)
Dept: RADIOLOGY | Facility: OTHER | Age: 58
Discharge: HOME | End: 2023-06-23
Attending: NURSE PRACTITIONER
Payer: COMMERCIAL

## 2023-06-23 ENCOUNTER — TELEPHONE (OUTPATIENT)
Dept: URGENT CARE | Facility: CLINIC | Age: 58
End: 2023-06-23

## 2023-06-23 DIAGNOSIS — R10.10 PAIN OF UPPER ABDOMEN: ICD-10-CM

## 2023-06-23 DIAGNOSIS — R11.2 NAUSEA AND VOMITING, UNSPECIFIED VOMITING TYPE: ICD-10-CM

## 2023-06-23 PROCEDURE — 74178 CT ABD&PLV WO CNTR FLWD CNTR: CPT | Mod: TC

## 2023-06-23 PROCEDURE — 25500020 PHARM REV CODE 255: Performed by: NURSE PRACTITIONER

## 2023-06-23 PROCEDURE — 74178 CT ABDOMEN PELVIS W WO CONTRAST: ICD-10-PCS | Mod: 26,,, | Performed by: RADIOLOGY

## 2023-06-23 PROCEDURE — 74178 CT ABD&PLV WO CNTR FLWD CNTR: CPT | Mod: 26,,, | Performed by: RADIOLOGY

## 2023-06-23 PROCEDURE — A9698 NON-RAD CONTRAST MATERIALNOC: HCPCS | Performed by: NURSE PRACTITIONER

## 2023-06-23 RX ADMIN — IOHEXOL 75 ML: 350 INJECTION, SOLUTION INTRAVENOUS at 06:06

## 2023-06-23 RX ADMIN — IOHEXOL 1000 ML: 9 SOLUTION ORAL at 05:06

## 2023-06-23 NOTE — TELEPHONE ENCOUNTER
Given patient labs results from UC visit 6/20/23. She already followed up with GI yesterday 6/22/23. Defer all medical management to GI and PCP.    Never

## 2023-06-26 ENCOUNTER — PATIENT MESSAGE (OUTPATIENT)
Dept: GASTROENTEROLOGY | Facility: CLINIC | Age: 58
End: 2023-06-26

## 2023-06-26 DIAGNOSIS — R93.89 ABNORMAL FINDING ON CT SCAN: ICD-10-CM

## 2023-06-26 DIAGNOSIS — R79.89 ELEVATED LFTS: Primary | ICD-10-CM

## 2023-06-27 ENCOUNTER — TELEPHONE (OUTPATIENT)
Dept: GASTROENTEROLOGY | Facility: CLINIC | Age: 58
End: 2023-06-27

## 2023-06-27 NOTE — TELEPHONE ENCOUNTER
----- Message from Josiane Soria sent at 6/27/2023  9:14 AM CDT -----  Type:  Needs Medical Advice    Who Called: pt  Symptoms (please be specific): needs to know during surgery are you  just removing the kidney stones or are you removing her whole gallbladder    Would the patient rather a call back or a response via [facility]? call  Best Call Back Number: [phone]   Additional Information: pt wants to know if she can have it done in [place] so her mother can help afterwards

## 2023-06-27 NOTE — TELEPHONE ENCOUNTER
Spoke with patient. Verbal understanding that she needs to speak with general surgery to get an appointment

## 2023-06-28 ENCOUNTER — TELEPHONE (OUTPATIENT)
Dept: GASTROENTEROLOGY | Facility: CLINIC | Age: 58
End: 2023-06-28

## 2023-06-28 NOTE — TELEPHONE ENCOUNTER
Spoke with patient. Verbal understanding that she has to contact the previous doctor to the gabapentin because nurse practitioner Shannon doesn't prescribe that.

## 2023-06-28 NOTE — TELEPHONE ENCOUNTER
Spoke with patient to let her know I will be sending a message to the referral coordinator to get her scheduled for a consultation with a general surgeon

## 2023-06-28 NOTE — TELEPHONE ENCOUNTER
----- Message from Harjinder George sent at 6/28/2023 10:11 AM CDT -----  Contact: Pt  .Type:  Needs Medical Advice    Who Called: pt  Would the patient rather a call back or a response via [facility]?  Call back  Best Call Back Number: [phone]  Additional Information: Pt. Is calling to see if her surgery can be moved to [place].  She has family in [place].

## 2023-06-30 ENCOUNTER — PATIENT MESSAGE (OUTPATIENT)
Dept: PSYCHIATRY | Facility: CLINIC | Age: 58
End: 2023-06-30

## 2023-07-05 ENCOUNTER — TELEPHONE (OUTPATIENT)
Dept: SURGERY | Facility: CLINIC | Age: 58
End: 2023-07-05

## 2023-07-07 ENCOUNTER — OFFICE VISIT (OUTPATIENT)
Dept: SURGERY | Facility: CLINIC | Age: 58
End: 2023-07-07
Payer: COMMERCIAL

## 2023-07-07 DIAGNOSIS — R93.89 ABNORMAL FINDING ON CT SCAN: ICD-10-CM

## 2023-07-07 PROCEDURE — 99204 PR OFFICE/OUTPT VISIT, NEW, LEVL IV, 45-59 MIN: ICD-10-PCS | Mod: 95,,, | Performed by: STUDENT IN AN ORGANIZED HEALTH CARE EDUCATION/TRAINING PROGRAM

## 2023-07-07 PROCEDURE — 1160F PR REVIEW ALL MEDS BY PRESCRIBER/CLIN PHARMACIST DOCUMENTED: ICD-10-PCS | Mod: CPTII,95,, | Performed by: STUDENT IN AN ORGANIZED HEALTH CARE EDUCATION/TRAINING PROGRAM

## 2023-07-07 PROCEDURE — 1160F RVW MEDS BY RX/DR IN RCRD: CPT | Mod: CPTII,95,, | Performed by: STUDENT IN AN ORGANIZED HEALTH CARE EDUCATION/TRAINING PROGRAM

## 2023-07-07 PROCEDURE — 99204 OFFICE O/P NEW MOD 45 MIN: CPT | Mod: 95,,, | Performed by: STUDENT IN AN ORGANIZED HEALTH CARE EDUCATION/TRAINING PROGRAM

## 2023-07-07 PROCEDURE — 1159F MED LIST DOCD IN RCRD: CPT | Mod: CPTII,95,, | Performed by: STUDENT IN AN ORGANIZED HEALTH CARE EDUCATION/TRAINING PROGRAM

## 2023-07-07 PROCEDURE — 1159F PR MEDICATION LIST DOCUMENTED IN MEDICAL RECORD: ICD-10-PCS | Mod: CPTII,95,, | Performed by: STUDENT IN AN ORGANIZED HEALTH CARE EDUCATION/TRAINING PROGRAM

## 2023-07-07 NOTE — PROGRESS NOTES
Established Patient - Audio Only Telehealth Visit     The patient location is: home  The chief complaint leading to consultation is: ab pain  Visit type: Virtual visit with audio only (telephone)  Total time spent with patient: 20min       The reason for the audio only service rather than synchronous audio and video virtual visit was related to technical difficulties or patient preference/necessity.     Each patient to whom I provide medical services by telemedicine is:  (1) informed of the relationship between the physician and patient and the respective role of any other health care provider with respect to management of the patient; and (2) notified that they may decline to receive medical services by telemedicine and may withdraw from such care at any time. Patient verbally consented to receive this service via voice-only telephone call.       HPI:   57F complains of diffuse ab pain, eveywhere for about 2 months, bloating sensation. Overall worsening. Never had before two months ago. Feels like something pushing up in her chest making it hard to breath. Hx of constpiation, thought it was related to that. Has a very small BM every few days. Takes miralax, started on bentyl. Not much help.   Went to urgent care about two weeks ago, CT showed ascites, gallstone without bile duct dilation.   AST/AST elevation going back years. Tbili 2.1 now up from 1.3.   Hx of etoh abuse. Used to drink 6-7 drinks per day for years, stopped two months ago.   No hx of MI that she knows of.      Assessment and plan:    Gallstone on imaging but symptoms certainly do not sound biliary in origin  Needs work up for ascites, bilirubin.   Recommended fiber, colace  Has appointment with dr gaona hepatology coming up soon.   No plans for cholecystectomy                        This service was not originating from a related E/M service provided within the previous 7 days nor will  to an E/M service or procedure within the next 24  hours or my soonest available appointment.  Prevailing standard of care was able to be met in this audio-only visit.

## 2023-07-12 ENCOUNTER — NURSE TRIAGE (OUTPATIENT)
Dept: ADMINISTRATIVE | Facility: CLINIC | Age: 58
End: 2023-07-12
Payer: COMMERCIAL

## 2023-07-12 NOTE — TELEPHONE ENCOUNTER
"Pt calling states she has appt with hepatology on 7/19/23 but states she doesn't think she can wait till then. States her whole abd all the way to under her breast is "hard as a rock" and states she has trouble walking and sleeping. States she cant eat and it feels like its pushing up making a little harder to breath. Rates abd pain at "9" out of 10. Per protocol advised to ED NOW. verbalized understanding. Denies any further questions or concerns at this time, advised to call back if they have any that come up. Advised pt to call back with any other concerns or worsening symptoms. Verbalized understanding and will route message to provider.     Reason for Disposition   SEVERE abdominal pain (e.g., excruciating)    Additional Information   Negative: Passed out (i.e., fainted, collapsed and was not responding)   Negative: Shock suspected (e.g., cold/pale/clammy skin, too weak to stand, low BP, rapid pulse)   Negative: Sounds like a life-threatening emergency to the triager    Protocols used: Abdominal Pain - Female-A-[place]    "

## 2023-07-13 ENCOUNTER — TELEPHONE (OUTPATIENT)
Dept: HEPATOLOGY | Facility: CLINIC | Age: 58
End: 2023-07-13
Payer: COMMERCIAL

## 2023-07-13 NOTE — TELEPHONE ENCOUNTER
Patient instructed to go to the ER.  Severe abdominal pain.  Feeling sick.  Stomach is swelling from breast down.  Patient fell last night.    Patient said she will go to the ER.

## 2023-07-13 NOTE — TELEPHONE ENCOUNTER
----- Message from Rosie Langston MA sent at 7/13/2023  3:01 PM CDT -----  Regarding: FW: Speak to staff  Contact: Claire    ----- Message -----  From: Alexandrea Suárez  Sent: 7/13/2023   2:03 PM CDT  To: [facility] Staff  Subject: Speak to staff                                   Regarding: Fluid on Stomach/ extremely painful        Name Of Caller: Claire Casper        Contact Preference: [phone] (home)              Nature of call:  pt is calling to speak to staff regarding fluid on your stomach, stomach extremely her , causing her not to breed properly. States she's in a lot of pain. Requesting a call back

## 2023-07-14 ENCOUNTER — HOSPITAL ENCOUNTER (INPATIENT)
Facility: HOSPITAL | Age: 58
LOS: 2 days | Discharge: HOME | DRG: 433 | End: 2023-07-18
Attending: EMERGENCY MEDICINE | Admitting: HOSPITALIST
Payer: COMMERCIAL

## 2023-07-14 DIAGNOSIS — K74.60 CIRRHOSIS OF LIVER WITH ASCITES, UNSPECIFIED HEPATIC CIRRHOSIS TYPE: ICD-10-CM

## 2023-07-14 DIAGNOSIS — E83.42 HYPOMAGNESEMIA: ICD-10-CM

## 2023-07-14 DIAGNOSIS — K70.31 ALCOHOLIC CIRRHOSIS OF LIVER WITH ASCITES: Primary | ICD-10-CM

## 2023-07-14 DIAGNOSIS — R18.8 CIRRHOSIS OF LIVER WITH ASCITES, UNSPECIFIED HEPATIC CIRRHOSIS TYPE: ICD-10-CM

## 2023-07-14 DIAGNOSIS — R07.9 CHEST PAIN: ICD-10-CM

## 2023-07-14 DIAGNOSIS — F10.90 ALCOHOL USE DISORDER: ICD-10-CM

## 2023-07-14 DIAGNOSIS — N30.00 ACUTE CYSTITIS WITHOUT HEMATURIA: ICD-10-CM

## 2023-07-14 PROBLEM — K80.20 CHOLELITHIASIS WITHOUT CHOLECYSTITIS: Status: ACTIVE | Noted: 2023-07-14

## 2023-07-14 PROBLEM — N39.0 UTI (URINARY TRACT INFECTION): Status: ACTIVE | Noted: 2023-07-14

## 2023-07-14 PROBLEM — E87.1 HYPONATREMIA: Status: ACTIVE | Noted: 2023-07-14

## 2023-07-14 PROBLEM — D72.829 LEUKOCYTOSIS: Status: ACTIVE | Noted: 2023-07-14

## 2023-07-14 LAB
A1AT SERPL-MCNC: 198 MG/DL (ref 100–190)
ABO + RH BLD: NORMAL
ALBUMIN SERPL BCP-MCNC: 2.7 G/DL (ref 3.5–5.2)
ALP SERPL-CCNC: 187 U/L (ref 55–135)
ALT SERPL W/O P-5'-P-CCNC: 32 U/L (ref 10–44)
AMMONIA PLAS-SCNC: 40 UMOL/L (ref 10–50)
ANION GAP SERPL CALC-SCNC: 10 MMOL/L (ref 8–16)
APAP SERPL-MCNC: <3 UG/ML (ref 10–20)
APTT PPP: 27.5 SEC (ref 21–32)
AST SERPL-CCNC: 96 U/L (ref 10–40)
BACTERIA #/AREA URNS AUTO: ABNORMAL /HPF
BASOPHILS # BLD AUTO: 0.19 K/UL (ref 0–0.2)
BASOPHILS NFR BLD: 1 % (ref 0–1.9)
BILIRUB DIRECT SERPL-MCNC: 0.8 MG/DL (ref 0.1–0.3)
BILIRUB SERPL-MCNC: 2.2 MG/DL (ref 0.1–1)
BILIRUB UR QL STRIP: ABNORMAL
BLD GP AB SCN CELLS X3 SERPL QL: NORMAL
BNP SERPL-MCNC: 71 PG/ML (ref 0–99)
BUN SERPL-MCNC: 10 MG/DL (ref 6–20)
CALCIUM SERPL-MCNC: 9.1 MG/DL (ref 8.7–10.5)
CERULOPLASMIN SERPL-MCNC: 26 MG/DL (ref 15–45)
CHLORIDE SERPL-SCNC: 101 MMOL/L (ref 95–110)
CLARITY UR REFRACT.AUTO: ABNORMAL
CO2 SERPL-SCNC: 20 MMOL/L (ref 23–29)
COLOR UR AUTO: ABNORMAL
CREAT SERPL-MCNC: 0.6 MG/DL (ref 0.5–1.4)
DIFFERENTIAL METHOD: ABNORMAL
EOSINOPHIL # BLD AUTO: 0.5 K/UL (ref 0–0.5)
EOSINOPHIL NFR BLD: 2.5 % (ref 0–8)
ERYTHROCYTE [DISTWIDTH] IN BLOOD BY AUTOMATED COUNT: 17.9 % (ref 11.5–14.5)
EST. GFR  (NO RACE VARIABLE): >60 ML/MIN/1.73 M^2
ETHANOL SERPL-MCNC: <10 MG/DL
GLUCOSE SERPL-MCNC: 92 MG/DL (ref 70–110)
GLUCOSE UR QL STRIP: NEGATIVE
HCT VFR BLD AUTO: 31.7 % (ref 37–48.5)
HCV AB SERPL QL IA: NORMAL
HGB BLD-MCNC: 10.3 G/DL (ref 12–16)
HGB UR QL STRIP: NEGATIVE
HIV 1+2 AB+HIV1 P24 AG SERPL QL IA: NORMAL
HYALINE CASTS UR QL AUTO: 0 /LPF
IMM GRANULOCYTES # BLD AUTO: 0.14 K/UL (ref 0–0.04)
IMM GRANULOCYTES NFR BLD AUTO: 0.7 % (ref 0–0.5)
INR PPP: 1.3 (ref 0.8–1.2)
IRON SERPL-MCNC: 40 UG/DL (ref 30–160)
KETONES UR QL STRIP: ABNORMAL
LEUKOCYTE ESTERASE UR QL STRIP: ABNORMAL
LIPASE SERPL-CCNC: 28 U/L (ref 4–60)
LYMPHOCYTES # BLD AUTO: 2 K/UL (ref 1–4.8)
LYMPHOCYTES NFR BLD: 10 % (ref 18–48)
MAGNESIUM SERPL-MCNC: 1.2 MG/DL (ref 1.6–2.6)
MCH RBC QN AUTO: 35.9 PG (ref 27–31)
MCHC RBC AUTO-ENTMCNC: 32.5 G/DL (ref 32–36)
MCV RBC AUTO: 111 FL (ref 82–98)
MICROSCOPIC COMMENT: ABNORMAL
MONOCYTES # BLD AUTO: 1.1 K/UL (ref 0.3–1)
MONOCYTES NFR BLD: 5.3 % (ref 4–15)
NEUTROPHILS # BLD AUTO: 15.9 K/UL (ref 1.8–7.7)
NEUTROPHILS NFR BLD: 80.5 % (ref 38–73)
NITRITE UR QL STRIP: NEGATIVE
NRBC BLD-RTO: 0 /100 WBC
PH UR STRIP: 6 [PH] (ref 5–8)
PLATELET # BLD AUTO: 324 K/UL (ref 150–450)
PMV BLD AUTO: 9.7 FL (ref 9.2–12.9)
POTASSIUM SERPL-SCNC: 4.1 MMOL/L (ref 3.5–5.1)
PROT SERPL-MCNC: 7 G/DL (ref 6–8.4)
PROT UR QL STRIP: ABNORMAL
PROTHROMBIN TIME: 13.9 SEC (ref 9–12.5)
RBC # BLD AUTO: 2.87 M/UL (ref 4–5.4)
RBC #/AREA URNS AUTO: 13 /HPF (ref 0–4)
SATURATED IRON: 27 % (ref 20–50)
SODIUM SERPL-SCNC: 131 MMOL/L (ref 136–145)
SP GR UR STRIP: 1.02 (ref 1–1.03)
SPECIMEN OUTDATE: NORMAL
SQUAMOUS #/AREA URNS AUTO: 13 /HPF
T4 FREE SERPL-MCNC: 1.08 NG/DL (ref 0.71–1.51)
TOTAL IRON BINDING CAPACITY: 149 UG/DL (ref 250–450)
TRANSFERRIN SERPL-MCNC: 101 MG/DL (ref 200–375)
TROPONIN I SERPL DL<=0.01 NG/ML-MCNC: <0.006 NG/ML (ref 0–0.03)
TSH SERPL DL<=0.005 MIU/L-ACNC: 7.44 UIU/ML (ref 0.4–4)
URN SPEC COLLECT METH UR: ABNORMAL
WBC # BLD AUTO: 19.78 K/UL (ref 3.9–12.7)
WBC #/AREA URNS AUTO: 23 /HPF (ref 0–5)

## 2023-07-14 PROCEDURE — 87389 HIV-1 AG W/HIV-1&-2 AB AG IA: CPT | Performed by: PHYSICIAN ASSISTANT

## 2023-07-14 PROCEDURE — 82390 ASSAY OF CERULOPLASMIN: CPT

## 2023-07-14 PROCEDURE — 85025 COMPLETE CBC W/AUTO DIFF WBC: CPT

## 2023-07-14 PROCEDURE — 84466 ASSAY OF TRANSFERRIN: CPT

## 2023-07-14 PROCEDURE — 96365 THER/PROPH/DIAG IV INF INIT: CPT

## 2023-07-14 PROCEDURE — 99223 1ST HOSP IP/OBS HIGH 75: CPT | Mod: 25,GC,, | Performed by: HOSPITALIST

## 2023-07-14 PROCEDURE — 36415 COLL VENOUS BLD VENIPUNCTURE: CPT

## 2023-07-14 PROCEDURE — 96367 TX/PROPH/DG ADDL SEQ IV INF: CPT

## 2023-07-14 PROCEDURE — 99223 PR INITIAL HOSPITAL CARE,LEVL III: ICD-10-PCS | Mod: 25,GC,, | Performed by: HOSPITALIST

## 2023-07-14 PROCEDURE — 82077 ASSAY SPEC XCP UR&BREATH IA: CPT

## 2023-07-14 PROCEDURE — 87077 CULTURE AEROBIC IDENTIFY: CPT

## 2023-07-14 PROCEDURE — 87086 URINE CULTURE/COLONY COUNT: CPT

## 2023-07-14 PROCEDURE — 96366 THER/PROPH/DIAG IV INF ADDON: CPT

## 2023-07-14 PROCEDURE — 84484 ASSAY OF TROPONIN QUANT: CPT

## 2023-07-14 PROCEDURE — 25000003 PHARM REV CODE 250

## 2023-07-14 PROCEDURE — G0378 HOSPITAL OBSERVATION PER HR: HCPCS

## 2023-07-14 PROCEDURE — 63600175 PHARM REV CODE 636 W HCPCS

## 2023-07-14 PROCEDURE — 80143 DRUG ASSAY ACETAMINOPHEN: CPT

## 2023-07-14 PROCEDURE — 80053 COMPREHEN METABOLIC PANEL: CPT

## 2023-07-14 PROCEDURE — 25500020 PHARM REV CODE 255: Performed by: HOSPITALIST

## 2023-07-14 PROCEDURE — 86900 BLOOD TYPING SEROLOGIC ABO: CPT

## 2023-07-14 PROCEDURE — 82103 ALPHA-1-ANTITRYPSIN TOTAL: CPT

## 2023-07-14 PROCEDURE — 83880 ASSAY OF NATRIURETIC PEPTIDE: CPT

## 2023-07-14 PROCEDURE — 87088 URINE BACTERIA CULTURE: CPT

## 2023-07-14 PROCEDURE — 84443 ASSAY THYROID STIM HORMONE: CPT

## 2023-07-14 PROCEDURE — 86038 ANTINUCLEAR ANTIBODIES: CPT

## 2023-07-14 PROCEDURE — 86015 ACTIN ANTIBODY EACH: CPT

## 2023-07-14 PROCEDURE — 93010 EKG 12-LEAD: ICD-10-PCS | Mod: ,,, | Performed by: INTERNAL MEDICINE

## 2023-07-14 PROCEDURE — 99285 EMERGENCY DEPT VISIT HI MDM: CPT | Mod: 25

## 2023-07-14 PROCEDURE — 93005 ELECTROCARDIOGRAM TRACING: CPT

## 2023-07-14 PROCEDURE — 83735 ASSAY OF MAGNESIUM: CPT

## 2023-07-14 PROCEDURE — 82728 ASSAY OF FERRITIN: CPT

## 2023-07-14 PROCEDURE — 93010 ELECTROCARDIOGRAM REPORT: CPT | Mod: ,,, | Performed by: INTERNAL MEDICINE

## 2023-07-14 PROCEDURE — 81001 URINALYSIS AUTO W/SCOPE: CPT

## 2023-07-14 PROCEDURE — 86803 HEPATITIS C AB TEST: CPT | Performed by: PHYSICIAN ASSISTANT

## 2023-07-14 PROCEDURE — 85610 PROTHROMBIN TIME: CPT

## 2023-07-14 PROCEDURE — 82140 ASSAY OF AMMONIA: CPT

## 2023-07-14 PROCEDURE — 82248 BILIRUBIN DIRECT: CPT

## 2023-07-14 PROCEDURE — 87186 SC STD MICRODIL/AGAR DIL: CPT

## 2023-07-14 PROCEDURE — 96375 TX/PRO/DX INJ NEW DRUG ADDON: CPT

## 2023-07-14 PROCEDURE — 84439 ASSAY OF FREE THYROXINE: CPT

## 2023-07-14 PROCEDURE — 85730 THROMBOPLASTIN TIME PARTIAL: CPT

## 2023-07-14 PROCEDURE — 83690 ASSAY OF LIPASE: CPT

## 2023-07-14 PROCEDURE — 80074 ACUTE HEPATITIS PANEL: CPT

## 2023-07-14 PROCEDURE — 86381 MITOCHONDRIAL ANTIBODY EACH: CPT

## 2023-07-14 PROCEDURE — 80321 ALCOHOLS BIOMARKERS 1OR 2: CPT

## 2023-07-14 RX ORDER — FUROSEMIDE 10 MG/ML
20 INJECTION INTRAMUSCULAR; INTRAVENOUS ONCE
Status: COMPLETED | OUTPATIENT
Start: 2023-07-14 | End: 2023-07-14

## 2023-07-14 RX ORDER — SPIRONOLACTONE 25 MG/1
50 TABLET ORAL ONCE
Status: COMPLETED | OUTPATIENT
Start: 2023-07-14 | End: 2023-07-14

## 2023-07-14 RX ORDER — LACTULOSE 10 G/15ML
20 SOLUTION ORAL 2 TIMES DAILY
Status: DISPENSED | OUTPATIENT
Start: 2023-07-14 | End: 2023-07-16

## 2023-07-14 RX ORDER — MAGNESIUM SULFATE HEPTAHYDRATE 40 MG/ML
2 INJECTION, SOLUTION INTRAVENOUS
Status: COMPLETED | OUTPATIENT
Start: 2023-07-14 | End: 2023-07-14

## 2023-07-14 RX ORDER — NALOXONE HCL 0.4 MG/ML
0.02 VIAL (ML) INJECTION
Status: DISCONTINUED | OUTPATIENT
Start: 2023-07-14 | End: 2023-07-18 | Stop reason: HOSPADM

## 2023-07-14 RX ORDER — IBUPROFEN 200 MG
16 TABLET ORAL
Status: DISCONTINUED | OUTPATIENT
Start: 2023-07-14 | End: 2023-07-18 | Stop reason: HOSPADM

## 2023-07-14 RX ORDER — MAGNESIUM SULFATE HEPTAHYDRATE 40 MG/ML
2 INJECTION, SOLUTION INTRAVENOUS ONCE
Status: COMPLETED | OUTPATIENT
Start: 2023-07-14 | End: 2023-07-15

## 2023-07-14 RX ORDER — IBUPROFEN 200 MG
24 TABLET ORAL
Status: DISCONTINUED | OUTPATIENT
Start: 2023-07-14 | End: 2023-07-18 | Stop reason: HOSPADM

## 2023-07-14 RX ORDER — GLUCAGON 1 MG
1 KIT INJECTION
Status: DISCONTINUED | OUTPATIENT
Start: 2023-07-14 | End: 2023-07-18 | Stop reason: HOSPADM

## 2023-07-14 RX ORDER — SODIUM CHLORIDE 0.9 % (FLUSH) 0.9 %
10 SYRINGE (ML) INJECTION EVERY 12 HOURS PRN
Status: DISCONTINUED | OUTPATIENT
Start: 2023-07-14 | End: 2023-07-18 | Stop reason: HOSPADM

## 2023-07-14 RX ORDER — THIAMINE HCL 100 MG
100 TABLET ORAL DAILY
Status: DISCONTINUED | OUTPATIENT
Start: 2023-07-15 | End: 2023-07-18 | Stop reason: HOSPADM

## 2023-07-14 RX ORDER — TALC
6 POWDER (GRAM) TOPICAL NIGHTLY PRN
Status: DISCONTINUED | OUTPATIENT
Start: 2023-07-14 | End: 2023-07-18 | Stop reason: HOSPADM

## 2023-07-14 RX ORDER — AMOXICILLIN 250 MG
1 CAPSULE ORAL 2 TIMES DAILY
Status: DISCONTINUED | OUTPATIENT
Start: 2023-07-14 | End: 2023-07-18 | Stop reason: HOSPADM

## 2023-07-14 RX ORDER — SODIUM CHLORIDE 0.9 % (FLUSH) 0.9 %
10 SYRINGE (ML) INJECTION
Status: DISCONTINUED | OUTPATIENT
Start: 2023-07-14 | End: 2023-07-18 | Stop reason: HOSPADM

## 2023-07-14 RX ORDER — ACETAMINOPHEN 325 MG/1
325 TABLET ORAL EVERY 6 HOURS PRN
Status: DISCONTINUED | OUTPATIENT
Start: 2023-07-14 | End: 2023-07-18 | Stop reason: HOSPADM

## 2023-07-14 RX ORDER — POLYETHYLENE GLYCOL 3350 17 G/17G
17 POWDER, FOR SOLUTION ORAL DAILY
Status: DISCONTINUED | OUTPATIENT
Start: 2023-07-15 | End: 2023-07-14

## 2023-07-14 RX ADMIN — MAGNESIUM SULFATE 2 G: 2 INJECTION INTRAVENOUS at 06:07

## 2023-07-14 RX ADMIN — LACTULOSE 20 G: 20 SOLUTION ORAL at 10:07

## 2023-07-14 RX ADMIN — SPIRONOLACTONE 50 MG: 25 TABLET, FILM COATED ORAL at 10:07

## 2023-07-14 RX ADMIN — Medication 6 MG: at 11:07

## 2023-07-14 RX ADMIN — FUROSEMIDE 20 MG: 10 INJECTION, SOLUTION INTRAMUSCULAR; INTRAVENOUS at 10:07

## 2023-07-14 RX ADMIN — SENNOSIDES AND DOCUSATE SODIUM 1 TABLET: 50; 8.6 TABLET ORAL at 10:07

## 2023-07-14 RX ADMIN — CEFTRIAXONE 1 G: 1 INJECTION, POWDER, FOR SOLUTION INTRAMUSCULAR; INTRAVENOUS at 04:07

## 2023-07-14 RX ADMIN — MAGNESIUM SULFATE 2 G: 2 INJECTION INTRAVENOUS at 10:07

## 2023-07-14 RX ADMIN — IOHEXOL 75 ML: 350 INJECTION, SOLUTION INTRAVENOUS at 07:07

## 2023-07-14 NOTE — ED TRIAGE NOTES
Patient comes into the emergency department by POV with complaints of chest pain. Patient states that started experiencing CP and chest fullness this AM with no relief. Pt does have liver failure and reports progressive abdominal swelling/ascites. Patient reports SOB and weakness.

## 2023-07-14 NOTE — ED NOTES
Informed pt of need for urine sample and that if unable to provide sample,RN will have to straight cath. Pt agrees and will attempt to provide sample. Care ongoing.

## 2023-07-14 NOTE — ED PROVIDER NOTES
Encounter Date: 7/14/2023       History     Chief Complaint   Patient presents with    Chest Pain     Pt w/ hx of liver failure reporting mid-sternal chest heaviness/dullness. States pain is non-radiating. Alleviated when laying flat and worse when leaning forward    Abdominal Pain     Reports progressive swelling to abdomen. Firm and distended.     Claire Casper is a 57 y.o. female with a PMH of ADHD, alcohol use disorder, and DJD presenting to [facility] Department with a chief complaint of worsening abdominal swelling worsening over last 2 months, with acutely worsened dyspnea and midsternal chest pain in the last few days. Saw GI last week, has apt with hepatology next week. States she quit drinking 3 months ago. No prior paracenteses.     The history is provided by medical records and the patient. No  was used.   Review of patient's allergies indicates:  No Known Allergies  Past Medical History:   Diagnosis Date    ADHD 10/5/2020    COVID-19 virus infection     DJD (degenerative joint disease) of cervical spine     Hx of psychiatric care     Sleep difficulties     Therapy      Past Surgical History:   Procedure Laterality Date    AUGMENTATION OF BREAST      SINUS SURGERY       Family History   Problem Relation Age of Onset    Asthma Mother     Stomach cancer Mother 70    Coronary artery disease Father     Heart attacks under age 50 Father 45    Hypertension Father      Social History     Tobacco Use    Smoking status: Never    Smokeless tobacco: Never   Substance Use Topics    Alcohol use: Yes     Comment: Wine, typically 1 to 2 glasses    Drug use: Never     Review of Systems  All other systems reviewed and were negative; see HPI also for additional ROS.    Physical Exam     Initial Vitals [07/14/23 1341]   BP Pulse Resp Temp SpO2   105/74 (!) 135 16 97.4 °F (36.3 °C) 98 %      MAP       --         Physical Exam    Nursing note and vitals reviewed.  Constitutional: She is not  diaphoretic. No distress.   Thin extremities, chronically ill appearing    HENT:   Head: Normocephalic.   Right Ear: External ear normal.   Left Ear: External ear normal.   Mouth/Throat: Oropharynx is clear and moist.   Eyes: EOM are normal. Scleral icterus is present.   Neck: Neck supple.   Cardiovascular:  Regular rhythm.   Tachycardia present.         Pulmonary/Chest: No stridor. No respiratory distress. She has rales.   Abdominal: She exhibits distension. There is no abdominal tenderness. There is no rebound and no guarding.   Musculoskeletal:      Cervical back: Neck supple.      Comments: No lower extremity edema      Neurological: She is alert and oriented to person, place, and time. GCS score is 15. GCS eye subscore is 4. GCS verbal subscore is 5. GCS motor subscore is 6.   No asterixis, slightly slowed mentation    Skin: Skin is warm and dry. Capillary refill takes less than 2 seconds.   Diffuse spider angiomata        ED Course   Procedures  Labs Reviewed   COMPREHENSIVE METABOLIC PANEL - Abnormal; Notable for the following components:       Result Value    Sodium 131 (*)     CO2 20 (*)     Albumin 2.7 (*)     Total Bilirubin 2.2 (*)     Alkaline Phosphatase 187 (*)     AST 96 (*)     All other components within normal limits   CBC W/ AUTO DIFFERENTIAL - Abnormal; Notable for the following components:    WBC 19.78 (*)     RBC 2.87 (*)     Hemoglobin 10.3 (*)     Hematocrit 31.7 (*)      (*)     MCH 35.9 (*)     RDW 17.9 (*)     Immature Granulocytes 0.7 (*)     Gran # (ANC) 15.9 (*)     Immature Grans (Abs) 0.14 (*)     Mono # 1.1 (*)     Gran % 80.5 (*)     Lymph % 10.0 (*)     All other components within normal limits   PROTIME-INR - Abnormal; Notable for the following components:    Prothrombin Time 13.9 (*)     INR 1.3 (*)     All other components within normal limits   MAGNESIUM - Abnormal; Notable for the following components:    Magnesium 1.2 (*)     All other components within normal limits    TSH - Abnormal; Notable for the following components:    TSH 7.443 (*)     All other components within normal limits   URINALYSIS, REFLEX TO URINE CULTURE - Abnormal; Notable for the following components:    Color, UA Orange (*)     Appearance, UA Hazy (*)     Protein, UA 1+ (*)     Ketones, UA 1+ (*)     Bilirubin (UA) 1+ (*)     Leukocytes, UA 2+ (*)     All other components within normal limits    Narrative:     Specimen Source->Urine   BILIRUBIN, DIRECT - Abnormal; Notable for the following components:    Bilirubin, Direct 0.8 (*)     All other components within normal limits   ACETAMINOPHEN LEVEL - Abnormal; Notable for the following components:    Acetaminophen (Tylenol), Serum <3.0 (*)     All other components within normal limits   URINALYSIS MICROSCOPIC - Abnormal; Notable for the following components:    RBC, UA 13 (*)     WBC, UA 23 (*)     Bacteria Many (*)     All other components within normal limits    Narrative:     Specimen Source->Urine   CULTURE, URINE   HIV 1 / 2 ANTIBODY    Narrative:     Release to patient->Immediate   HEPATITIS C ANTIBODY    Narrative:     Release to patient->Immediate   LIPASE   APTT   TROPONIN I   B-TYPE NATRIURETIC PEPTIDE   AMMONIA   ALCOHOL,MEDICAL (ETHANOL)   T4, FREE   PHOSPHATIDYLETHANOL (PETH)   TOXICOLOGY SCREEN, URINE, RANDOM (COMPLIANCE)   TYPE & SCREEN     EKG Readings: (Independently Interpreted)   Initial Reading: No STEMI. Previous EKG: Compared with most recent EKG Rhythm: Sinus Tachycardia. Heart Rate: 127. Clinical Impression: Sinus Tachycardia Other Impression: low voltage   ECG Results              EKG 12-lead (Final result)  Result time 07/14/23 16:27:34      Final result by Interface, Lab In [facility] (07/14/23 16:27:34)                   Narrative:    Test Reason : R07.9,    Vent. Rate : 127 BPM     Atrial Rate : 127 BPM     P-R Int : 114 ms          QRS Dur : 082 ms      QT Int : 328 ms       P-R-T Axes : 044 006 064 degrees     QTc Int : 476  ms    Sinus tachycardia  Low voltage QRS in limb leads   Nonspecific ST and/or T wave abnormalities  Abnormal ECG  When compared with ECG of 24-MAR-2021 07:41,  Nonspecific ST and/or T wave abnormalities Now present  Confirmed by Too Moore MD ([id number]) on 7/14/2023 4:27:30 PM    Referred By: [email or web address]   SELF           Confirmed By:Too Moore MD                                  Imaging Results              CT Abdomen Pelvis With Contrast (Final result)  Result time 07/14/23 23:25:43      Final result by Adolfo Shelby DO (07/14/23 23:25:43)                   Impression:      1. Morphologic changes of hepatic cirrhosis and findings of portal hypertension including moderate abdominopelvic ascites and upper abdominal varices including paraesophageal varices.  2. Ill-defined hypodensity in the right hepatic lobe highly concerning for hepatocellular carcinoma.  Further evaluation with nonemergent MRI of the abdomen with and without contrast is recommended.  3. Small left pleural effusion.  4. Cholelithiasis without acute cholecystitis.  5. Mild body wall anasarca.      Electronically signed by: Adolfo Shelby  Date:    07/14/2023  Time:    23:25               Narrative:    EXAMINATION:  CT ABDOMEN PELVIS WITH CONTRAST    CLINICAL HISTORY:  Abdominal abscess/infection suspected;    TECHNIQUE:  Axial CT images with sagittal and coronal reformats were obtained of the abdomen and pelvis from the hemidiaphragms through the symphysis pubis after the administration of 75mL Omnipaque 350.    COMPARISON:  CT of the abdomen and pelvis from 06/23/2023.    FINDINGS:  Lung Bases: There is a small left pleural effusion.  There are areas of bandlike subsegmental atelectasis or scarring in the lung bases.    Heart: Heart size is normal.  No pericardial effusion.    Liver: The liver is enlarged and demonstrates a nodular contour compatible with chronic liver disease.  There is heterogeneous enhancement of the liver.  There is a  hypo attenuating lesion in the right hepatic lobe measuring approximately 1.6 cm (series 2, image 33).  There are findings of portal hypertension including moderate abdominopelvic ascites and upper abdominal ascites.  The portal vasculature is patent.    Biliary tract: No intrahepatic or extrahepatic biliary ductal dilatation.    Gallbladder: There are radiodense gallstones.  There is no gallbladder wall thickening or pericholecystic fluid.    Pancreas: Normal. No pancreatic ductal dilatation.    Spleen: The spleen is normal in size.  There are numerous punctate calcified granulomas in the spleen.    Adrenals: Unremarkable.    Kidneys and urinary collecting systems: Normal.  No hydronephrosis or urolithiasis.    Lymph nodes: None enlarged.    Stomach and bowel: There are perigastric and paraesophageal varices.  Loops of small and large bowel are normal in caliber without evidence for inflammation or obstruction.    Peritoneum and mesentery: No ascites or free intraperitoneal air.  No abdominal fluid collection.    Vasculature: No aneurysm or significant atherosclerosis.    Urinary bladder: No wall thickening.    Reproductive organs: The uterus and adnexae are unremarkable.    Body wall: Partially imaged bilateral breast implants.  There is mild body wall anasarca.    Musculoskeletal: No aggressive osseous lesion.  There are degenerative changes of the spine.                                        US Abdomen Limited (Final result)  Result time 07/14/23 18:00:31      Final result by Alexis Damon MD (07/14/23 18:00:31)                   Impression:      Cholelithiasis without secondary signs of cholecystitis.    Common bile duct ranges from 5-11 mm however there is no choledocholithiasis or other identified finding which may explain extrahepatic biliary duct dilatation.  Recommend CT abdomen pelvis with contrast for additional characterization..    Ascites.    This report was flagged in Epic as  abnormal.    Electronically signed by resident: Chinedu Head  Date:    07/14/2023  Time:    17:37    Electronically signed by: Alexis Damon MD  Date:    07/14/2023  Time:    18:00               Narrative:    EXAMINATION:  US ABDOMEN LIMITED    CLINICAL HISTORY:  jaundice, known cholelithiasis, evaluate for choledocho/cholecystitis;.    TECHNIQUE:  Limited ultrasound of the right upper quadrant of the abdomen including pancreas, liver, gallbladder, common bile duct was performed.    COMPARISON:  CT 06/23/2023    FINDINGS:  Pancreas: Obscured by overlying bowel gas    Liver: Normal in size, measuring 17.0 cm. Diffusely increased echogenicity.  No focal hepatic lesions. Hepatorenal index 2.0.    Gallbladder: Multiple gallstones are seen.  There is no gallbladder wall thickening or pericholecystic fluid.  No sonographic Vela's sign.    Biliary system: The common duct ranges from 5-11 mm.  No intrahepatic ductal dilatation.    Spleen: Normal in size with a homogeneous echotexture, measuring 8.6 x 2.5 cm.  Punctate echogenic focus likely a granuloma.    Miscellaneous: Ascites.                                       X-Ray Chest AP Portable (Final result)  Result time 07/14/23 15:54:41      Final result by Godfrey Barahona MD (07/14/23 15:54:41)                   Impression:      See above      Electronically signed by: Godfrey Barahona MD  Date:    07/14/2023  Time:    15:54               Narrative:    EXAMINATION:  XR CHEST AP PORTABLE    CLINICAL HISTORY:  dyspnea;    TECHNIQUE:  Single frontal view of the chest was performed.    COMPARISON:  Non 06/20/2023 e    FINDINGS:  Heart size normal.  Ground-glass opacification of the lower lung fields consistent with underlying small amount of pleural effusion and atelectatic changes.  The lung apices are clear.                                    X-Rays:   Independently Interpreted Readings:   Chest X-Ray: Normal heart size. Small bilateral pleural effusions, no pneumothorax or  free air   Medications   sodium chloride 0.9% flush 10 mL (has no administration in time range)   naloxone 0.4 mg/mL injection 0.02 mg (has no administration in time range)   glucose chewable tablet 16 g (has no administration in time range)   glucose chewable tablet 24 g (has no administration in time range)   glucagon (human recombinant) injection 1 mg (has no administration in time range)   dextrose 10% bolus 125 mL 125 mL (has no administration in time range)   dextrose 10% bolus 250 mL 250 mL (has no administration in time range)   senna-docusate 8.6-50 mg per tablet 1 tablet (1 tablet Oral Given 7/15/23 0937)   melatonin tablet 6 mg (6 mg Oral Given 7/14/23 2325)   acetaminophen tablet 325 mg (has no administration in time range)   thiamine tablet 100 mg (100 mg Oral Given 7/15/23 0937)   sodium chloride 0.9% flush 10 mL (has no administration in time range)   multivitamin tablet (1 tablet Oral Given 7/15/23 0937)   lactulose 20 gram/30 mL solution Soln 20 g (20 g Oral Given 7/15/23 0937)   furosemide injection 20 mg (20 mg Intravenous Given 7/15/23 1032)   spironolactone tablet 50 mg (50 mg Oral Given 7/15/23 1032)   cefTRIAXone (ROCEPHIN) 1 g in dextrose 5 % in water (D5W) 5 % 100 mL IVPB (MB+) (0 g Intravenous Stopped 7/14/23 1723)   magnesium sulfate 2g in water 50mL IVPB (premix) (0 g Intravenous Stopped 7/14/23 2008)   iohexoL (OMNIPAQUE 350) injection 75 mL (75 mLs Intravenous Given 7/14/23 1917)   furosemide injection 20 mg (20 mg Intravenous Given 7/14/23 2238)   spironolactone tablet 50 mg (50 mg Oral Given 7/14/23 2238)   magnesium sulfate 2g in water 50mL IVPB (premix) (0 g Intravenous Stopped 7/15/23 0047)   potassium chloride SA CR tablet 20 mEq (20 mEq Oral Given 7/15/23 1115)     Medical Decision Making:   History:   Old Medical Records: I decided to obtain old medical records.  Old Records Summarized: records from clinic visits, records from previous admission(s), other records and records  from another hospital.  Initial Assessment:   Patient is afebrile, hemodynamically stable, and in no acute distress on arrival.   She has stigmata of liver disease - ascites, spider angiomata, scleral icterus   No asterixis. A/o x 4     Differential Diagnosis:   Differential diagnoses considered include, but not limited to:  Decompensated cirrhosis   SBP   Biliary obstruction   Electrolyte abnormality   Independently Interpreted Test(s):   I have ordered and independently interpreted X-rays - see prior notes.  I have ordered and independently interpreted EKG Reading(s) - see prior notes  Clinical Tests:   Lab Tests: Ordered and Reviewed  Radiological Study: Ordered and Reviewed  Medical Tests: Reviewed and Ordered  ED Management:  MELD score today is 18, abdomen is pretty distended so she will likely need therapeutic tap.   Started on ceftriaxone out of concern for SBP   RUQ ultrasound to rule out biliary obstruction (she has known cholelithiasis)  Bedside US didn't show any pericardial effusion, but she does have low voltage EKG.    See ED course     PT will be admitted for decompensated cirrhosis.   I discussed admission with hospital medicine who agreed to admit the patient for further evaluation and management.     Other:   I have discussed this case with another health care provider.          Attending Attestation:   Physician Attestation Statement for Resident:  As the supervising MD   Physician Attestation Statement: I have personally seen and examined this patient.   I agree with the above history.  -:   As the supervising MD I agree with the above PE.     As the supervising MD I agree with the above treatment, course, plan, and disposition.                  I have reviewed and concur with the resident's history, physical, assessment, and plan.  I have personally interviewed and examined the patient at bedside.   I did supervise any and all procedures and was present for any critical portion, and was always  immediately available for help and as a resource.     The above history physical, review of symptoms, HPI and physical exam reflect my independent interpretation and evaluation.    Complexity: High Risk    Final diagnoses:  [R07.9] Chest pain  [K70.31] Alcoholic cirrhosis of liver with ascites (Primary)     Lee Ware DO, FAAEM  Emergency Staff Physician   Dept of Emergency Medicine   [facility]  Spectralink: [id number]        Disclaimer: This note has been generated using voice-recognition software. There may be typographical errors that have been missed during proof-reading.            ED Course as of 07/15/23 1113   Fri Jul 14, 2023   1526 WBC(!): 19.78  Leukocytosis  [OW]   1528 Hemoglobin(!): 10.3  Stable anemia from 2 weeks ago, 10.5  [OW]   1553 Sodium(!): 131  Hyponatremia  [OW]   1553 BILIRUBIN TOTAL(!): 2.2  Hyperbilirubinemia  [OW]   1553 Albumin(!): 2.7  Hypoalbuminemia  [OW]   1553 X-Ray Chest AP Portable  Bilateral pleural effusions per my independent interpretation  [OW]   1554 Ammonia: 40  Normal ammonia level    [OW]   1607 Bilirubin Direct(!): 0.8 [OW]   1608 INR(!): 1.3 [OW]   1608 MELD Score 18  [OW]   1608 MELD 18 points  6.0%  Estimated 3-Month Mortality      INPUTS:  Creatinine -> 0.6 mg/dL  Bilirubin -> 2.2 mg/dL  INR -> 1.3    Sodium -> 131 mEq/L   [OW]   1639 TSH(!): 7.443  Elevated TSH  [OW]   1646 Free T4: 1.08  Normal T4    [OW]   1704 Alkaline Phosphatase(!): 187 [OW]   1705 AST(!): 96 [OW]   1705 ALT: 32 [OW]      ED Course User Index  [OW] Sade James MD                   Clinical Impression:   Final diagnoses:  [R07.9] Chest pain  [K70.31] Alcoholic cirrhosis of liver with ascites (Primary)        ED Disposition Condition    Observation                 Sade James MD  Resident  07/14/23 1847       Lee Ware DO  07/15/23 1115

## 2023-07-15 LAB
ALBUMIN FLD-MCNC: 0.6 G/DL
ALBUMIN SERPL BCP-MCNC: 2.5 G/DL (ref 3.5–5.2)
ALP SERPL-CCNC: 186 U/L (ref 55–135)
ALT SERPL W/O P-5'-P-CCNC: 31 U/L (ref 10–44)
ANION GAP SERPL CALC-SCNC: 13 MMOL/L (ref 8–16)
APPEARANCE FLD: NORMAL
AST SERPL-CCNC: 93 U/L (ref 10–40)
BASOPHILS # BLD AUTO: 0.15 K/UL (ref 0–0.2)
BASOPHILS NFR BLD: 0.7 % (ref 0–1.9)
BILIRUB SERPL-MCNC: 1.5 MG/DL (ref 0.1–1)
BODY FLD TYPE: NORMAL
BODY FLUID SOURCE, LDH: NORMAL
BUN SERPL-MCNC: 9 MG/DL (ref 6–20)
CALCIUM SERPL-MCNC: 9.1 MG/DL (ref 8.7–10.5)
CHLORIDE SERPL-SCNC: 100 MMOL/L (ref 95–110)
CO2 SERPL-SCNC: 21 MMOL/L (ref 23–29)
COLOR FLD: YELLOW
CREAT SERPL-MCNC: 0.6 MG/DL (ref 0.5–1.4)
DIFFERENTIAL METHOD: ABNORMAL
EOSINOPHIL # BLD AUTO: 0.6 K/UL (ref 0–0.5)
EOSINOPHIL NFR BLD: 3.1 % (ref 0–8)
ERYTHROCYTE [DISTWIDTH] IN BLOOD BY AUTOMATED COUNT: 17.3 % (ref 11.5–14.5)
EST. GFR  (NO RACE VARIABLE): >60 ML/MIN/1.73 M^2
FERRITIN SERPL-MCNC: 872 NG/ML (ref 20–300)
GLUCOSE FLD-MCNC: 124 MG/DL
GLUCOSE SERPL-MCNC: 87 MG/DL (ref 70–110)
GRAM STN SPEC: NORMAL
GRAM STN SPEC: NORMAL
HAV IGM SERPL QL IA: NORMAL
HBV CORE IGM SERPL QL IA: NORMAL
HBV SURFACE AG SERPL QL IA: NORMAL
HCT VFR BLD AUTO: 27.6 % (ref 37–48.5)
HCV AB SERPL QL IA: NORMAL
HGB BLD-MCNC: 9.5 G/DL (ref 12–16)
IMM GRANULOCYTES # BLD AUTO: 0.14 K/UL (ref 0–0.04)
IMM GRANULOCYTES NFR BLD AUTO: 0.7 % (ref 0–0.5)
INR PPP: 1.3 (ref 0.8–1.2)
LDH FLD L TO P-CCNC: 38 U/L
LYMPHOCYTES # BLD AUTO: 1.7 K/UL (ref 1–4.8)
LYMPHOCYTES NFR BLD: 8.7 % (ref 18–48)
LYMPHOCYTES NFR FLD MANUAL: 44 %
MAGNESIUM SERPL-MCNC: 1.8 MG/DL (ref 1.6–2.6)
MCH RBC QN AUTO: 37.1 PG (ref 27–31)
MCHC RBC AUTO-ENTMCNC: 34.4 G/DL (ref 32–36)
MCV RBC AUTO: 108 FL (ref 82–98)
MONOCYTES # BLD AUTO: 1.2 K/UL (ref 0.3–1)
MONOCYTES NFR BLD: 5.9 % (ref 4–15)
MONOS+MACROS NFR FLD MANUAL: 22 %
NEUTROPHILS # BLD AUTO: 16.2 K/UL (ref 1.8–7.7)
NEUTROPHILS NFR BLD: 80.9 % (ref 38–73)
NEUTROPHILS NFR FLD MANUAL: 34 %
NRBC BLD-RTO: 0 /100 WBC
PHOSPHATE SERPL-MCNC: 2.5 MG/DL (ref 2.7–4.5)
PLATELET # BLD AUTO: 284 K/UL (ref 150–450)
PMV BLD AUTO: 10.3 FL (ref 9.2–12.9)
POTASSIUM SERPL-SCNC: 3.4 MMOL/L (ref 3.5–5.1)
PROT FLD-MCNC: 1 G/DL
PROT SERPL-MCNC: 6.2 G/DL (ref 6–8.4)
PROTHROMBIN TIME: 13.3 SEC (ref 9–12.5)
RBC # BLD AUTO: 2.56 M/UL (ref 4–5.4)
SODIUM SERPL-SCNC: 134 MMOL/L (ref 136–145)
SPECIMEN SOURCE: NORMAL
WBC # BLD AUTO: 20.05 K/UL (ref 3.9–12.7)
WBC # FLD: 77 /CU MM

## 2023-07-15 PROCEDURE — 88112 PR  CYTOPATH, CELL ENHANCE TECH: ICD-10-PCS | Mod: 26,,, | Performed by: PATHOLOGY

## 2023-07-15 PROCEDURE — 87075 CULTR BACTERIA EXCEPT BLOOD: CPT

## 2023-07-15 PROCEDURE — 89051 BODY FLUID CELL COUNT: CPT

## 2023-07-15 PROCEDURE — 63600175 PHARM REV CODE 636 W HCPCS

## 2023-07-15 PROCEDURE — 49082 PR ABD PARACENTESIS: ICD-10-PCS | Mod: ,,, | Performed by: HOSPITALIST

## 2023-07-15 PROCEDURE — 82945 GLUCOSE OTHER FLUID: CPT

## 2023-07-15 PROCEDURE — 84100 ASSAY OF PHOSPHORUS: CPT

## 2023-07-15 PROCEDURE — 85610 PROTHROMBIN TIME: CPT

## 2023-07-15 PROCEDURE — 83615 LACTATE (LD) (LDH) ENZYME: CPT

## 2023-07-15 PROCEDURE — 88112 CYTOPATH CELL ENHANCE TECH: CPT | Performed by: PATHOLOGY

## 2023-07-15 PROCEDURE — 99233 PR SUBSEQUENT HOSPITAL CARE,LEVL III: ICD-10-PCS | Mod: 25,GC,, | Performed by: HOSPITALIST

## 2023-07-15 PROCEDURE — 99233 SBSQ HOSP IP/OBS HIGH 50: CPT | Mod: 25,GC,, | Performed by: HOSPITALIST

## 2023-07-15 PROCEDURE — 80053 COMPREHEN METABOLIC PANEL: CPT

## 2023-07-15 PROCEDURE — 96376 TX/PRO/DX INJ SAME DRUG ADON: CPT

## 2023-07-15 PROCEDURE — G0378 HOSPITAL OBSERVATION PER HR: HCPCS

## 2023-07-15 PROCEDURE — 25000003 PHARM REV CODE 250

## 2023-07-15 PROCEDURE — 84157 ASSAY OF PROTEIN OTHER: CPT

## 2023-07-15 PROCEDURE — 85025 COMPLETE CBC W/AUTO DIFF WBC: CPT

## 2023-07-15 PROCEDURE — 36415 COLL VENOUS BLD VENIPUNCTURE: CPT

## 2023-07-15 PROCEDURE — 82042 OTHER SOURCE ALBUMIN QUAN EA: CPT

## 2023-07-15 PROCEDURE — 49082 ABD PARACENTESIS: CPT | Mod: ,,, | Performed by: HOSPITALIST

## 2023-07-15 PROCEDURE — 88112 CYTOPATH CELL ENHANCE TECH: CPT | Mod: 26,,, | Performed by: PATHOLOGY

## 2023-07-15 PROCEDURE — 49082 ABD PARACENTESIS: CPT

## 2023-07-15 PROCEDURE — 87205 SMEAR GRAM STAIN: CPT

## 2023-07-15 PROCEDURE — 83735 ASSAY OF MAGNESIUM: CPT

## 2023-07-15 PROCEDURE — 87070 CULTURE OTHR SPECIMN AEROBIC: CPT

## 2023-07-15 RX ORDER — LORATADINE 10 MG/1
10 TABLET ORAL DAILY PRN
COMMUNITY

## 2023-07-15 RX ORDER — IBUPROFEN 200 MG
200-400 TABLET ORAL EVERY 8 HOURS PRN
Status: ON HOLD | COMMUNITY
End: 2023-07-18 | Stop reason: HOSPADM

## 2023-07-15 RX ORDER — BISACODYL 5 MG
5 TABLET, DELAYED RELEASE (ENTERIC COATED) ORAL DAILY PRN
COMMUNITY
End: 2023-09-21 | Stop reason: ALTCHOICE

## 2023-07-15 RX ORDER — ALBUMIN HUMAN 250 G/1000ML
12.5 SOLUTION INTRAVENOUS ONCE
Status: DISCONTINUED | OUTPATIENT
Start: 2023-07-15 | End: 2023-07-15

## 2023-07-15 RX ORDER — SPIRONOLACTONE 25 MG/1
50 TABLET ORAL DAILY
Status: DISCONTINUED | OUTPATIENT
Start: 2023-07-15 | End: 2023-07-17

## 2023-07-15 RX ORDER — FUROSEMIDE 10 MG/ML
20 INJECTION INTRAMUSCULAR; INTRAVENOUS DAILY
Status: DISCONTINUED | OUTPATIENT
Start: 2023-07-15 | End: 2023-07-16

## 2023-07-15 RX ORDER — POTASSIUM CHLORIDE 20 MEQ/1
20 TABLET, EXTENDED RELEASE ORAL ONCE
Status: COMPLETED | OUTPATIENT
Start: 2023-07-15 | End: 2023-07-15

## 2023-07-15 RX ADMIN — SPIRONOLACTONE 50 MG: 25 TABLET, FILM COATED ORAL at 10:07

## 2023-07-15 RX ADMIN — THIAMINE HCL TAB 100 MG 100 MG: 100 TAB at 09:07

## 2023-07-15 RX ADMIN — SENNOSIDES AND DOCUSATE SODIUM 1 TABLET: 50; 8.6 TABLET ORAL at 09:07

## 2023-07-15 RX ADMIN — Medication 6 MG: at 09:07

## 2023-07-15 RX ADMIN — POTASSIUM CHLORIDE 20 MEQ: 1500 TABLET, EXTENDED RELEASE ORAL at 11:07

## 2023-07-15 RX ADMIN — FUROSEMIDE 20 MG: 10 INJECTION, SOLUTION INTRAMUSCULAR; INTRAVENOUS at 10:07

## 2023-07-15 RX ADMIN — LACTULOSE 20 G: 20 SOLUTION ORAL at 09:07

## 2023-07-15 RX ADMIN — THERA TABS 1 TABLET: TAB at 09:07

## 2023-07-15 NOTE — ASSESSMENT & PLAN NOTE
On admission, WBC 19.78 likely 2/2 to UTI or SBP. UA and paracentesis ordered to assess source of infection.    --Trend CBC  --One dose rocephin given in ED. Abx on hold until after paracentesis  --Previous Urine culture 6/20 grew E. Coli

## 2023-07-15 NOTE — H&P
"[facility] - Emergency Dept  [facility] Medicine  History & Physical    Patient Name: Claire Casper  MRN: [id number]  Patient Class: OP- Observation  Admission Date: 7/14/2023  Attending Physician: Stacey Ness MD   Primary Care Provider: Gogo Ramirez MD         Patient information was obtained from patient, past medical records and ER records.     Subjective:     Principal Problem:Cirrhosis of liver with ascites    Chief Complaint:   Chief Complaint   Patient presents with    Chest Pain     Pt w/ hx of liver failure reporting mid-sternal chest heaviness/dullness. States pain is non-radiating. Alleviated when laying flat and worse when leaning forward    Abdominal Pain     Reports progressive swelling to abdomen. Firm and distended.        HPI: This is a 57 year old woman with PMH ADHD on Adderall daily, insomnia on clonidine, DJD (degenerative joint disease) of cervical spine, previous breast augmentation, chronic constipation and alcoholism. She presents to the ED with worsening abdominal swelling worsening over last 2 months, with acutely worsened dyspnea and midsternal chest pain in the last few days. She visited urgent care for the symptoms and was referred to GI and for imaging. She received CTAP w/wo contrast on 6/23/23 showing "Hepatomegaly. Liver nodularity raising question of cirrhosis. Mild ascites. Cholelithiasis without cholecystitis. Diverticulosis without diverticulitis. She saw GI and was referred to hepatology where she has a scheduled appointment next week. She presented to ED before this appointment because her abdominal distension is becoming increasingly painful, she does not want to eat because she has a feeling of fullness, and she is constipated with last bowel movement 10 days ago. She reports decades of social drinking, 5-8 drinks, that increased over the last decade. Since the distension and discomfort started 2 months ago she denies having any alcoholic drinks. She endorses " "shortness of breath on exertion and is using a walker, constipation, and abdominal pain. She denies fever, chills, sweating, nausea, vomiting, chest pain, palpitations, cough, dysuria, excessive bleeding or brusing.    On ED admission she received an abdominal U/S showing ascites, cholelithiasis without secondary signs of cholecystitis with CBD ranging from 5-11 mm with no choledocholithiasis or other explanation for extrahepatic biliary duct dilation. CTAP with contrast ordered and results pending.CXR showed: "Heart size normal.  Ground-glass opacification of the lower lung fields consistent with underlying small amount of pleural effusion and atelectatic changes.  The lung apices are clear". UA results: orange hazy appearnace, +1 protein, ketones +1, Bilirubin +1, leukocyte +2, RBC 13, WBC 23, bacteria present, cultures sent. Labs notable for WBC 19.78, H/H 10.3/31.7, MCV 11, PT 13.9, INR 1.3, Na 131, CO2 20, Mg 1.2, Alk Jt 187, Tbili 2.2, Direct bili 0.8, AST 96, ALT 32 (wnl), TSH 7.443. Labs negative for elevated acetaminophen levels. EKG showed low voltage QRS in limb leads. IR paracentesis planned with fluid analysis to rule out SBP. Patient started on ceftriaxone in ED, spironolactone and lasix ordered on admission to floor.        Past Medical History:   Diagnosis Date    ADHD 10/5/2020    COVID-19 virus infection     DJD (degenerative joint disease) of cervical spine     Hx of psychiatric care     Sleep difficulties     Therapy        Past Surgical History:   Procedure Laterality Date    AUGMENTATION OF BREAST      SINUS SURGERY         Review of patient's allergies indicates:  No Known Allergies    No current facility-administered medications on file prior to encounter.     Current Outpatient Medications on File Prior to Encounter   Medication Sig    cloNIDine (CATAPRES) 0.1 MG tablet Take 1 tab by mouth each evening as needed for sleep    dextroamphetamine-amphetamine 30 mg Tab Take 1 tablet " (30 mg total) by mouth Daily.    MAGNESIUM ORAL Take 300 mg by mouth once daily.    multivitamin (THERAGRAN) per tablet Take 1 tablet by mouth once daily.    thiamine 100 MG tablet Take 100 mg by mouth once daily.    dicyclomine (BENTYL) 20 mg tablet Take 1 tablet (20 mg total) by mouth 4 (four) times daily as needed (abdominal).    fluticasone propionate (FLONASE) 50 mcg/actuation nasal spray 2 sprays in each nostril daily (Patient not taking: Reported on 6/20/2023)    gabapentin (NEURONTIN) 300 MG capsule 2 capsules nightly (Patient not taking: Reported on 6/20/2023)    polyethylene glycol (GLYCOLAX) 17 gram PwPk Take 17 g by mouth 2 (two) times daily as needed (constipation; take with alot of water daily (atleast 100 ounces of fluid daily)).     Family History       Problem Relation (Age of Onset)    Asthma Mother    Coronary artery disease Father    Heart attacks under age 50 Father (45)    Hypertension Father    Stomach cancer Mother (70)          Tobacco Use    Smoking status: Never    Smokeless tobacco: Never   Substance and Sexual Activity    Alcohol use: Yes     Comment: Wine, typically 1 to 2 glasses    Drug use: Never    Sexual activity: Yes     Partners: Male     Review of Systems   Constitutional:  Positive for activity change and appetite change. Negative for chills, diaphoresis, fatigue and fever.   HENT:  Positive for trouble swallowing. Negative for congestion, facial swelling, nosebleeds, postnasal drip, rhinorrhea, sinus pressure, sinus pain, sneezing and sore throat.    Eyes:  Negative for pain and visual disturbance.   Respiratory:  Positive for chest tightness, shortness of breath and wheezing. Negative for cough and choking.    Cardiovascular:  Negative for chest pain, palpitations and leg swelling.   Gastrointestinal:  Positive for abdominal distention, abdominal pain and constipation. Negative for anal bleeding, blood in stool, diarrhea, nausea, rectal pain and vomiting.    Genitourinary:  Negative for difficulty urinating, flank pain, frequency and urgency.   Musculoskeletal:  Positive for neck pain. Negative for arthralgias and back pain.   Skin:  Negative for color change and wound.   Neurological:  Positive for weakness. Negative for dizziness, seizures, syncope, light-headedness and headaches.   Hematological:  Does not bruise/bleed easily.   Psychiatric/Behavioral:  Negative for confusion.    Objective:     Vital Signs (Most Recent):  Temp: 97.4 °F (36.3 °C) (07/14/23 1341)  Pulse: (!) 112 (07/14/23 1832)  Resp: 16 (07/14/23 1341)  BP: 126/74 (07/14/23 1832)  SpO2: 97 % (07/14/23 1832) Vital Signs (24h Range):  Temp:  [97.4 °F (36.3 °C)] 97.4 °F (36.3 °C)  Pulse:  [109-135] 112  Resp:  [16] 16  SpO2:  [97 %-98 %] 97 %  BP: (105-135)/(74-86) 126/74     Weight: 58.2 kg (128 lb 4.9 oz)  Body mass index is 22.02 kg/m².     Physical Exam  Vitals and nursing note reviewed.   Constitutional:       Appearance: She is normal weight. She is ill-appearing.      Comments: Muscle wasting on limbs   HENT:      Head: Normocephalic and atraumatic.      Right Ear: External ear normal.      Left Ear: External ear normal.      Nose: Nose normal. No congestion or rhinorrhea.      Mouth/Throat:      Mouth: Mucous membranes are moist.      Pharynx: Oropharynx is clear. No oropharyngeal exudate.   Eyes:      General: Scleral icterus present.         Right eye: No discharge.         Left eye: No discharge.      Extraocular Movements: Extraocular movements intact.      Pupils: Pupils are equal, round, and reactive to light.   Cardiovascular:      Rate and Rhythm: Tachycardia present.      Pulses: Normal pulses.      Heart sounds: Normal heart sounds.   Pulmonary:      Effort: Pulmonary effort is normal.      Breath sounds: Rales present.   Abdominal:      General: There is distension.      Tenderness: There is abdominal tenderness. There is no right CVA tenderness or left CVA tenderness.      Comments:  Shifting dullness and fluid wave present   Musculoskeletal:      Cervical back: Normal range of motion.      Right lower leg: No edema.      Left lower leg: No edema.   Skin:     Coloration: Skin is pale.      Comments: Diffuse spider aniomas    Neurological:      General: No focal deficit present.      Mental Status: She is alert and oriented to person, place, and time.      Comments: No asterixis   Psychiatric:         Mood and Affect: Mood normal.            CRANIAL NERVES     CN III, IV, VI   Pupils are equal, round, and reactive to light.     Significant Labs: All pertinent labs within the past 24 hours have been reviewed.  Bilirubin:   Recent Labs   Lab 06/20/23  1804 07/14/23  1455   BILIDIR  --  0.8*   BILITOT 2.1* 2.2*     CBC:   Recent Labs   Lab 07/14/23  1455   WBC 19.78*   HGB 10.3*   HCT 31.7*        CMP:   Recent Labs   Lab 07/14/23  1455   *   K 4.1      CO2 20*   GLU 92   BUN 10   CREATININE 0.6   CALCIUM 9.1   PROT 7.0   ALBUMIN 2.7*   BILITOT 2.2*   ALKPHOS 187*   AST 96*   ALT 32   ANIONGAP 10     TSH:   Recent Labs   Lab 07/14/23  1455   TSH 7.443*     Urine Studies:   Recent Labs   Lab 07/14/23  1631   COLORU [place]*   APPEARANCEUA Hazy*   PHUR 6.0   SPECGRAV 1.025   PROTEINUA 1+*   GLUCUA Negative   KETONESU 1+*   BILIRUBINUA 1+*   OCCULTUA Negative   NITRITE Negative   LEUKOCYTESUR 2+*   RBCUA 13*   WBCUA 23*   BACTERIA Many*   SQUAMEPITHEL 13   HYALINECASTS 0       Significant Imaging: I have reviewed all pertinent imaging results/findings within the past 24 hours.    Assessment/Plan:     * Cirrhosis of liver with ascites  Patient presenting with ascites and abdominal distension. Possible causes include decompensated cirrhosis or SBP.  Etiology: alcohol use disorder  Cirrhosis history: First imaging on CTAP 6/23/23  Alcohol history: 30+ years of social drinking, mostly mixed drinks that became heavier over the last decade. Patient stopped drinking 2 months ago.  Tylenol /  Medication history: Labs negative for elevated acetaminophen.       Concern for SBP with leukocytosis of 19.78. One dose of Rocephin given in ED. Hold abx until after paracentesis.     MELD 3.0: 19 at 7/14/2023  2:55 PM  MELD-Na: 18 at 7/14/2023  2:55 PM  Calculated from:  Serum Creatinine: 0.6 mg/dL (Using min of 1 mg/dL) at 7/14/2023  2:55 PM  Serum Sodium: 131 mmol/L at 7/14/2023  2:55 PM  Total Bilirubin: 2.2 mg/dL at 7/14/2023  2:55 PM  Serum Albumin: 2.7 g/dL at 7/14/2023  2:55 PM  INR(ratio): 1.3 at 7/14/2023  2:55 PM  Age at listing (hypothetical): 57 years  Sex: Female at 7/14/2023  2:55 PM     -- CTAP w contrast pending  -- Lasix/Spironolactone ordered 20/50   -- Acute viral hepatitis panel ordered  -- Autoimmune panel (RAMÓN, anti-smooth, anti-martin) ordered  -- Diagnostic paracentesis w/ fluid studies to evaluate for SBP ordered with IR  -- A1AT ordered  -- Ceruloplasmin ordered  -- Iron study panel ordered  -- HSV, VZV, HIV ordered  -- Serum Tylenol level negative  -- Serum alcohol level negative  -- PETH ordered  -- Trend CMP LFTs / INR daily            Leukocytosis  On admission, WBC 19.78 likely 2/2 to UTI or SBP. UA and paracentesis ordered to assess source of infection.    --Trend CBC  --One dose rocephin given in ED. Abx on hold until after paracentesis  --Previous Urine culture 6/20 grew E. Coli      UTI (urinary tract infection)  Patient with previous history of asymptomatic UTI with Ucx growing E. Coli.  Diagnostics: UA orange hazy appearnace, +1 protein, ketones +1, Bilirubin +1, leukocyte +2, RBC 13, WBC 23, bacteria present. , Ucx pending, CBC remarkable for WBC 19.78.    -- Antibiotics: 1 dose of Ceftriaxone in ED. Holding for paracentesis  -- Trend CBC  -- Strict I/Os  -- place on pure wick      Hyponatremia  Patient presents with Na 131 likely secondary to suspected cirrhosis    -- Trend Na  -- IVF PRN       Cholelithiasis without cholecystitis  Patient had CTAP 6/23/23 showing  "cholelithiasis without cholecystitis and was referred to GI. She says that she was not a candidate for surgerical cholecystectomy due to her abdominal distension and liver cirrhosis so she was referred to hepatology. On admission to ED U/S showed "cholelithiasis without secondary signs of cholecystitis and common bile ducts ranging from 5-11 mm however there is no choledocholithiasis or other identified finding which may explain extrahepatic biliary duct dilatation.  Recommend CT abdomen pelvis with contrast for additional characterization."     -- CTAP pending      Ascites  See cirrhosis of liver with ascites      Transaminitis  Patient has a chronic history of alcohol use disorder with CTAP from 6/23 showing a nodular liver architecture suggestive of cirrhosis. On admission AST 96 and ALT 32.     -- Trend CMP       Elevated TSH  Elevated TSH in the setting of normal T4. Likely subclinical hyperthyroidism. Recommend outpatient follow up.    TSH 7.443  Free T4 1.08    Hypomagnesemia  On admission patient has Mg 1.2 likely due to chronic malnutrition with possible cirrhosis in the setting of alcohol use disorder.    -- 2g IV Mg given  -- Trend Mg and replete with goal Mg > 2         VTE Risk Mitigation (From admission, onward)         Ordered     IP VTE LOW RISK PATIENT  Once         07/14/23 1916     Place sequential compression device  Until discontinued         07/14/23 1916                       On 07/14/2023, patient should be placed in hospital observation services under my care in collaboration with Stacey Ness MD.    Vick Watlers MD  Department of Hospital Medicine  [facility] - Emergency Dept  "

## 2023-07-15 NOTE — ASSESSMENT & PLAN NOTE
Patient presenting with ascites and abdominal distension. Possible causes include decompensated cirrhosis or SBP.  Etiology: alcohol use disorder  Cirrhosis history: First imaging on CTAP 6/23/23  Alcohol history: 30+ years of social drinking, mostly mixed drinks that became heavier over the last decade. Patient stopped drinking 2 months ago.  Tylenol / Medication history: Labs negative for elevated acetaminophen.       Concern for SBP with leukocytosis of 19.78. One dose of Rocephin given in ED. Hold abx until after paracentesis.     MELD 3.0: 19 at 7/14/2023  2:55 PM  MELD-Na: 18 at 7/14/2023  2:55 PM  Calculated from:  Serum Creatinine: 0.6 mg/dL (Using min of 1 mg/dL) at 7/14/2023  2:55 PM  Serum Sodium: 131 mmol/L at 7/14/2023  2:55 PM  Total Bilirubin: 2.2 mg/dL at 7/14/2023  2:55 PM  Serum Albumin: 2.7 g/dL at 7/14/2023  2:55 PM  INR(ratio): 1.3 at 7/14/2023  2:55 PM  Age at listing (hypothetical): 57 years  Sex: Female at 7/14/2023  2:55 PM     -- CTAP w contrast pending  -- Lasix/Spironolactone ordered 20/50   -- Acute viral hepatitis panel ordered  -- Autoimmune panel (RAMÓN, anti-smooth, anti-martin) ordered  -- Diagnostic paracentesis w/ fluid studies to evaluate for SBP ordered with IR  -- A1AT ordered  -- Ceruloplasmin ordered  -- Iron study panel ordered  -- HSV, VZV, HIV ordered  -- Serum Tylenol level negative  -- Serum alcohol level negative  -- PETH ordered  -- Trend CMP LFTs / INR daily

## 2023-07-15 NOTE — PLAN OF CARE
Problem: Adult Inpatient Plan of Care  Goal: Plan of Care Review  Outcome: Ongoing, Progressing  Goal: Patient-Specific Goal (Individualized)  Outcome: Ongoing, Progressing  Goal: Absence of Hospital-Acquired Illness or Injury  Outcome: Ongoing, Progressing  Goal: Optimal Comfort and Wellbeing  Outcome: Ongoing, Progressing  Goal: Readiness for Transition of Care  Outcome: Ongoing, Progressing     Problem: Fall Injury Risk  Goal: Absence of Fall and Fall-Related Injury  Outcome: Ongoing, Progressing     Problem: Electrolyte Imbalance  Goal: Electrolyte Balance  Outcome: Ongoing, Progressing

## 2023-07-15 NOTE — ASSESSMENT & PLAN NOTE
Patient with previous history of asymptomatic UTI with Ucx growing E. Coli.  Diagnostics: UA orange hazy appearnace, +1 protein, ketones +1, Bilirubin +1, leukocyte +2, RBC 13, WBC 23, bacteria present. , Ucx pending, CBC remarkable for WBC 19.78 on admission.    -- Cultures pending. WBC > 20  -- Antibiotics: 1 dose of Ceftriaxone in ED. Holding for paracentesis  -- Trend CBC  -- Strict I/Os  -- place on pure clara

## 2023-07-15 NOTE — PROCEDURES
"Claire Casper is a 57 y.o. female patient.    Temp: 98.3 °F (36.8 °C) (07/15/23 1202)  Pulse: (!) 117 (07/15/23 1202)  Resp: 18 (07/15/23 0411)  BP: 123/72 (07/15/23 1202)  SpO2: (!) 92 % (07/15/23 1202)  Weight: 58 kg (127 lb 13.9 oz) (23)  Height: 5' 4" (162.6 cm) (23 1341)       Paracentesis    Date/Time: 7/15/2023 3:03 PM  Location procedure was performed: [facility] MEDICINE  Performed by: Vick Walters MD  Authorized by: Vick Walters MD   Assisting provider: Stacey Ness MD  Pre-operative diagnosis: Cirrhosis with ascites  Post-operative diagnosis: Ascites  Consent Done: Yes  Consent: Verbal consent obtained. Written consent obtained.  Consent given by: patient  Patient understanding: patient states understanding of the procedure being performed  Patient consent: the patient's understanding of the procedure matches consent given  Procedure consent: procedure consent matches procedure scheduled  Relevant documents: relevant documents present and verified  Patient identity confirmed: name and   Initial or subsequent exam: initial  Procedure purpose: diagnostic and therapeutic  Indications: abdominal discomfort secondary to ascites and new onset ascites  Anesthesia: local infiltration    Anesthesia:  Local Anesthetic: lidocaine 1% with epinephrine  Anesthetic total: 5 mL    Patient sedated: no  Preparation: Patient was prepped and draped in the usual sterile fashion.  Ultrasound guidance: yes  Puncture site: left lower quadrant  Fluid removed: 3500(ml)  Fluid appearance: clear  Dressing: gauze packing  Complications: No  Estimated blood loss (mL): 10  Specimens: Yes  Implants: No  Patient tolerance: Patient tolerated the procedure well with no immediate complications        7/15/2023    "

## 2023-07-15 NOTE — SUBJECTIVE & OBJECTIVE
Interval History: Patient had first bowel movement after constipation for 10 days.    Review of Systems   Constitutional:  Positive for activity change and appetite change. Negative for chills, diaphoresis, fatigue and fever.   HENT:  Negative for congestion, facial swelling, nosebleeds, postnasal drip, rhinorrhea, sinus pressure, sinus pain, sneezing, sore throat and trouble swallowing.    Eyes:  Negative for pain and visual disturbance.   Respiratory:  Positive for shortness of breath. Negative for cough, choking, chest tightness and wheezing.    Cardiovascular:  Negative for chest pain, palpitations and leg swelling.   Gastrointestinal:  Positive for abdominal distention, abdominal pain and constipation. Negative for anal bleeding, blood in stool, diarrhea, nausea, rectal pain and vomiting.   Genitourinary:  Negative for difficulty urinating, flank pain, frequency and urgency.   Musculoskeletal:  Negative for arthralgias and back pain.   Skin:  Negative for color change and wound.   Neurological:  Positive for weakness. Negative for dizziness, seizures, syncope, light-headedness and headaches.   Hematological:  Does not bruise/bleed easily.   Psychiatric/Behavioral:  Negative for confusion.    Objective:     Vital Signs (Most Recent):  Temp: 98.3 °F (36.8 °C) (07/15/23 1202)  Pulse: (!) 117 (07/15/23 1202)  Resp: 18 (07/15/23 0411)  BP: 123/72 (07/15/23 1202)  SpO2: (!) 92 % (07/15/23 1202) Vital Signs (24h Range):  Temp:  [97.4 °F (36.3 °C)-98.5 °F (36.9 °C)] 98.3 °F (36.8 °C)  Pulse:  [105-135] 117  Resp:  [16-18] 18  SpO2:  [92 %-100 %] 92 %  BP: (105-135)/(67-86) 123/72     Weight: 58 kg (127 lb 13.9 oz)  Body mass index is 21.95 kg/m².    Intake/Output Summary (Last 24 hours) at 7/15/2023 1308  Last data filed at 7/15/2023 0047  Gross per 24 hour   Intake 150 ml   Output 0 ml   Net 150 ml         Physical Exam  Vitals and nursing note reviewed.   Constitutional:       Appearance: She is normal weight. She is  ill-appearing.      Comments: Muscle wasting on limbs   HENT:      Head: Normocephalic and atraumatic.      Right Ear: External ear normal.      Left Ear: External ear normal.      Nose: Nose normal. No congestion or rhinorrhea.      Mouth/Throat:      Mouth: Mucous membranes are moist.      Pharynx: Oropharynx is clear. No oropharyngeal exudate.   Eyes:      General: Scleral icterus present.         Right eye: No discharge.         Left eye: No discharge.      Extraocular Movements: Extraocular movements intact.      Pupils: Pupils are equal, round, and reactive to light.   Cardiovascular:      Rate and Rhythm: Tachycardia present.      Pulses: Normal pulses.      Heart sounds: Normal heart sounds.   Pulmonary:      Effort: Pulmonary effort is normal.      Breath sounds: Rales present.   Abdominal:      General: There is distension.      Tenderness: There is abdominal tenderness. There is no right CVA tenderness or left CVA tenderness.      Comments: Shifting dullness and fluid wave present   Musculoskeletal:      Cervical back: Normal range of motion.      Right lower leg: No edema.      Left lower leg: No edema.   Skin:     Coloration: Skin is pale.      Comments: Diffuse spider aniomas    Neurological:      General: No focal deficit present.      Mental Status: She is alert and oriented to person, place, and time.      Comments: No asterixis   Psychiatric:         Mood and Affect: Mood normal.           Significant Labs: All pertinent labs within the past 24 hours have been reviewed.  CBC:   Recent Labs   Lab 07/14/23  1455 07/15/23  0436   WBC 19.78* 20.05*   HGB 10.3* 9.5*   HCT 31.7* 27.6*    284     CMP:   Recent Labs   Lab 07/14/23  1455 07/15/23  0436   * 134*   K 4.1 3.4*    100   CO2 20* 21*   GLU 92 87   BUN 10 9   CREATININE 0.6 0.6   CALCIUM 9.1 9.1   PROT 7.0 6.2   ALBUMIN 2.7* 2.5*   BILITOT 2.2* 1.5*   ALKPHOS 187* 186*   AST 96* 93*   ALT 32 31   ANIONGAP 10 13       Significant  Imaging: I have reviewed all pertinent imaging results/findings within the past 24 hours.

## 2023-07-15 NOTE — ASSESSMENT & PLAN NOTE
Patient has a chronic history of alcohol use disorder with CTAP from 6/23 showing a nodular liver architecture suggestive of cirrhosis. On admission AST 96 and ALT 32.     -- Trend CMP

## 2023-07-15 NOTE — PHARMACY MED REC
"Admission Medication History     The home medication history was taken by Joy Suárez.    You may go to "Admission" then "Reconcile Home Medications" tabs to review and/or act upon these items.     The home medication list has been updated by the Pharmacy department.   Please read ALL comments highlighted in yellow.   Please address this information as you see fit.    Feel free to contact us if you have any questions or require assistance.      The medications listed below were removed from the home medication list. Please reorder if appropriate:  Patient reports no longer taking the following medication(s):  FLUTICASONE PROPIONATE 50 MCG/ ACTUATION NASAL SPRAY  GABAPENTIN 300 MG  MAGNESIUM  MULTIVITAMIN  POLYETHYLENE GLYCOL  THIAMINE 100 MG      Medications listed below were obtained from: Patient/family, Analytic software- [employer], Medical records, and Patient's pharmacy  PTA Medications   Medication Sig    bisacodyL (DULCOLAX) 5 mg EC tablet Take 5 mg by mouth daily as needed for Constipation.    cloNIDine (CATAPRES) 0.1 MG tablet Take 1 tab by mouth each evening as needed for sleep    dextroamphetamine-amphetamine 30 mg Tab Take 1 tablet (30 mg total) by mouth Daily. Last filled 5-22-23 for 30 day supply    ibuprofen (ADVIL,MOTRIN) 200 MG tablet Take 200-400 mg by mouth every 8 (eight) hours as needed for Pain.    loratadine (CLARITIN) 10 mg tablet Take 10 mg by mouth daily as needed for Allergies.    dicyclomine (BENTYL) 20 mg tablet Take 1 tablet (20 mg total) by mouth 4 (four) times daily as needed (abdominal). (Patient not taking: Reported on 7/15/2023) not picked up from pharmacy.        Potential issues to be addressed PRIOR TO DISCHARGE  Please discuss with the patient barriers to adherence with medication treatment plans    Joy Suárez  EXT [phone]                .        "

## 2023-07-15 NOTE — ASSESSMENT & PLAN NOTE
"Patient had CTAP 6/23/23 showing cholelithiasis without cholecystitis and was referred to GI. She says that she was not a candidate for surgerical cholecystectomy due to her abdominal distension and liver cirrhosis so she was referred to hepatology. On admission to ED U/S showed "cholelithiasis without secondary signs of cholecystitis and common bile ducts ranging from 5-11 mm however there is no choledocholithiasis or other identified finding which may explain extrahepatic biliary duct dilatation.  Recommend CT abdomen pelvis with contrast for additional characterization."     -- CTAP pending    "

## 2023-07-15 NOTE — CLINICAL REVIEW
IP Sepsis Screen (most recent)       Sepsis Screen (IP) - 07/15/23 7525       Is the patient's history or complaint suggestive of a possible infection? Yes  -    Are there at least two of the following signs and symptoms present? Yes  -    Sepsis signs/symptoms - Tachycardia Tachycardia     >90  -    Sepsis signs/symptoms - WBC WBC < 4,000 or WBC > 12,000  -    Are any of the following organ dysfunction criteria present and not considered to be due to a chronic condition? Yes  -    Organ Dysfunction Criteria - O2 O2 Saturation < 95% on room air  -    Initiate Sepsis Protocol No  -JM    Reason sepsis not considered Pt. receiving appropriate management  -              User Key  (r) = Recorded By, (t) = Taken By, (c) = Cosigned By      Initials Name     Rita Shaw RN

## 2023-07-15 NOTE — ASSESSMENT & PLAN NOTE
Patient presents with Na 131 likely secondary to suspected cirrhosis.     -- Trend Na in the setting of new lasix and spironolactone   -- IVF PRN

## 2023-07-15 NOTE — PROGRESS NOTES
"[facility] - Tele[facility] Medicine  Progress Note    Patient Name: Claire Casper  MRN: [id number]  Patient Class: OP- Observation   Admission Date: 7/14/2023  Length of Stay: 0 days  Attending Physician: Stacey Ness MD  Primary Care Provider: Gogo Ramirez MD        Subjective:     Principal Problem:Cirrhosis of liver with ascites        HPI:  This is a 57 year old woman with PMH ADHD on Adderall daily, insomnia on clonidine, DJD (degenerative joint disease) of cervical spine, previous breast augmentation, chronic constipation and alcoholism. She presents to the ED with worsening abdominal swelling worsening over last 2 months, with acutely worsened dyspnea and midsternal chest pain in the last few days. She visited urgent care for the symptoms and was referred to GI and for imaging. She received CTAP w/wo contrast on 6/23/23 showing "Hepatomegaly. Liver nodularity raising question of cirrhosis. Mild ascites. Cholelithiasis without cholecystitis. Diverticulosis without diverticulitis. She saw GI and was referred to hepatology where she has a scheduled appointment next week. She presented to ED before this appointment because her abdominal distension is becoming increasingly painful, she does not want to eat because she has a feeling of fullness, and she is constipated with last bowel movement 10 days ago. She reports decades of social drinking, 5-8 drinks, that increased over the last decade. Since the distension and discomfort started 2 months ago she denies having any alcoholic drinks. She endorses shortness of breath on exertion and is using a walker, constipation, and abdominal pain. She denies fever, chills, sweating, nausea, vomiting, chest pain, palpitations, cough, dysuria, excessive bleeding or brusing.    On ED admission she received an abdominal U/S showing ascites, cholelithiasis without secondary signs of cholecystitis with CBD ranging from 5-11 mm with no " "choledocholithiasis or other explanation for extrahepatic biliary duct dilation. CTAP with contrast ordered and results pending.CXR showed: "Heart size normal.  Ground-glass opacification of the lower lung fields consistent with underlying small amount of pleural effusion and atelectatic changes.  The lung apices are clear". UA results: orange hazy appearnace, +1 protein, ketones +1, Bilirubin +1, leukocyte +2, RBC 13, WBC 23, bacteria present, cultures sent. Labs notable for WBC 19.78, H/H 10.3/31.7, MCV 11, PT 13.9, INR 1.3, Na 131, CO2 20, Mg 1.2, Alk Jt 187, Tbili 2.2, Direct bili 0.8, AST 96, ALT 32 (wnl), TSH 7.443. Labs negative for elevated acetaminophen levels. EKG showed low voltage QRS in limb leads. IR paracentesis planned with fluid analysis to rule out SBP. Patient started on ceftriaxone in ED, spironolactone and lasix ordered on admission to floor.        Overview/Hospital Course:  Following admission for abdominal distension and leukocytosis of 19.78 thought to be from a UTI or SBP. CTAP results showed symptoms were likely due to hepatic cirrhosis with additional findings of portal hypertension with moderate abdominopelvic ascites and upper abdominal varices including paraesophageal varices and an ll-defined hypodensity in the right hepatic lobe highly concerning for hepatocellular carcinoma. Cirrhosis panel was ordered, patient was started on lasix/spironolactone and paracentesis planned.       Interval History: Patient had first bowel movement after constipation for 10 days.    Review of Systems   Constitutional:  Positive for activity change and appetite change. Negative for chills, diaphoresis, fatigue and fever.   HENT:  Negative for congestion, facial swelling, nosebleeds, postnasal drip, rhinorrhea, sinus pressure, sinus pain, sneezing, sore throat and trouble swallowing.    Eyes:  Negative for pain and visual disturbance.   Respiratory:  Positive for shortness of breath. Negative for cough, " choking, chest tightness and wheezing.    Cardiovascular:  Negative for chest pain, palpitations and leg swelling.   Gastrointestinal:  Positive for abdominal distention, abdominal pain and constipation. Negative for anal bleeding, blood in stool, diarrhea, nausea, rectal pain and vomiting.   Genitourinary:  Negative for difficulty urinating, flank pain, frequency and urgency.   Musculoskeletal:  Negative for arthralgias and back pain.   Skin:  Negative for color change and wound.   Neurological:  Positive for weakness. Negative for dizziness, seizures, syncope, light-headedness and headaches.   Hematological:  Does not bruise/bleed easily.   Psychiatric/Behavioral:  Negative for confusion.    Objective:     Vital Signs (Most Recent):  Temp: 98.3 °F (36.8 °C) (07/15/23 1202)  Pulse: (!) 117 (07/15/23 1202)  Resp: 18 (07/15/23 0411)  BP: 123/72 (07/15/23 1202)  SpO2: (!) 92 % (07/15/23 1202) Vital Signs (24h Range):  Temp:  [97.4 °F (36.3 °C)-98.5 °F (36.9 °C)] 98.3 °F (36.8 °C)  Pulse:  [105-135] 117  Resp:  [16-18] 18  SpO2:  [92 %-100 %] 92 %  BP: (105-135)/(67-86) 123/72     Weight: 58 kg (127 lb 13.9 oz)  Body mass index is 21.95 kg/m².    Intake/Output Summary (Last 24 hours) at 7/15/2023 1308  Last data filed at 7/15/2023 0047  Gross per 24 hour   Intake 150 ml   Output 0 ml   Net 150 ml         Physical Exam  Vitals and nursing note reviewed.   Constitutional:       Appearance: She is normal weight. She is ill-appearing.      Comments: Muscle wasting on limbs   HENT:      Head: Normocephalic and atraumatic.      Right Ear: External ear normal.      Left Ear: External ear normal.      Nose: Nose normal. No congestion or rhinorrhea.      Mouth/Throat:      Mouth: Mucous membranes are moist.      Pharynx: Oropharynx is clear. No oropharyngeal exudate.   Eyes:      General: Scleral icterus present.         Right eye: No discharge.         Left eye: No discharge.      Extraocular Movements: Extraocular movements  intact.      Pupils: Pupils are equal, round, and reactive to light.   Cardiovascular:      Rate and Rhythm: Tachycardia present.      Pulses: Normal pulses.      Heart sounds: Normal heart sounds.   Pulmonary:      Effort: Pulmonary effort is normal.      Breath sounds: Rales present.   Abdominal:      General: There is distension.      Tenderness: There is abdominal tenderness. There is no right CVA tenderness or left CVA tenderness.      Comments: Shifting dullness and fluid wave present   Musculoskeletal:      Cervical back: Normal range of motion.      Right lower leg: No edema.      Left lower leg: No edema.   Skin:     Coloration: Skin is pale.      Comments: Diffuse spider aniomas    Neurological:      General: No focal deficit present.      Mental Status: She is alert and oriented to person, place, and time.      Comments: No asterixis   Psychiatric:         Mood and Affect: Mood normal.           Significant Labs: All pertinent labs within the past 24 hours have been reviewed.  CBC:   Recent Labs   Lab 07/14/23  1455 07/15/23  0436   WBC 19.78* 20.05*   HGB 10.3* 9.5*   HCT 31.7* 27.6*    284     CMP:   Recent Labs   Lab 07/14/23  1455 07/15/23  0436   * 134*   K 4.1 3.4*    100   CO2 20* 21*   GLU 92 87   BUN 10 9   CREATININE 0.6 0.6   CALCIUM 9.1 9.1   PROT 7.0 6.2   ALBUMIN 2.7* 2.5*   BILITOT 2.2* 1.5*   ALKPHOS 187* 186*   AST 96* 93*   ALT 32 31   ANIONGAP 10 13       Significant Imaging: I have reviewed all pertinent imaging results/findings within the past 24 hours.      Assessment/Plan:      * Cirrhosis of liver with ascites  Patient presenting with ascites and abdominal distension. Possible causes include decompensated cirrhosis or SBP.  Etiology: alcohol use disorder  Cirrhosis history: First imaging on CTAP 6/23/23  Alcohol history: 30+ years of social drinking, mostly mixed drinks that became heavier over the last decade. Patient stopped drinking 2 months ago.  Tylenol /  Medication history: Labs negative for elevated acetaminophen.   CTAP Imagin. Morphologic changes of hepatic cirrhosis and findings of portal hypertension including moderate abdominopelvic ascites and upper abdominal varices including paraesophageal varices.   2. Ill-defined hypodensity in the right hepatic lobe highly concerning for hepatocellular carcinoma.  Further evaluation with nonemergent MRI of the abdomen with and without contrast is recommended.   3. Small left pleural effusion.   4. Cholelithiasis without acute cholecystitis.   5. Mild body wall anasarca.     Concern for SBP with leukocytosis. One dose of Rocephin given in ED. Hold abx until after paracentesis.     MELD 3.0: 16 at 7/15/2023  4:36 AM  MELD-Na: 11 at 7/15/2023  4:36 AM  Calculated from:  Serum Creatinine: 0.6 mg/dL (Using min of 1 mg/dL) at 7/15/2023  4:36 AM  Serum Sodium: 134 mmol/L at 7/15/2023  4:36 AM  Total Bilirubin: 1.5 mg/dL at 7/15/2023  4:36 AM  Serum Albumin: 2.5 g/dL at 7/15/2023  4:36 AM  INR(ratio): 1.3 at 7/15/2023  4:36 AM  Age at listing (hypothetical): 57 years  Sex: Female at 7/15/2023  4:36 AM     -- Lasix/Spironolactone 20/50 daily  -- Acute viral hepatitis panel ordered  -- Autoimmune panel (RAMÓN, anti-smooth, anti-martin) ordered  -- Diagnostic paracentesis w/ fluid studies to evaluate for SBP   -- A1AT ordered  -- Ceruloplasmin ordered  -- Iron study panel ordered  -- HSV, VZV, HIV ordered  -- Serum Tylenol level negative  -- Serum alcohol level negative  -- PETH ordered  -- Trend CMP LFTs / INR daily            Leukocytosis  On admission, WBC 19.78 likely 2/2 to UTI or SBP. UA and paracentesis ordered to assess source of infection.    --Trend CBC. WBC 20.05   --One dose rocephin given in ED. Abx on hold until after paracentesis  --Previous Urine culture  grew E. Coli      UTI (urinary tract infection)  Patient with previous history of asymptomatic UTI with Ucx growing E. Coli.  Diagnostics: UA orange hazy  "appearnace, +1 protein, ketones +1, Bilirubin +1, leukocyte +2, RBC 13, WBC 23, bacteria present. , Ucx pending, CBC remarkable for WBC 19.78 on admission.    -- Cultures pending. WBC > 20  -- Antibiotics: 1 dose of Ceftriaxone in ED. Holding for paracentesis  -- Trend CBC  -- Strict I/Os  -- place on pure wick      Hyponatremia  Patient presents with Na 131 likely secondary to suspected cirrhosis.     -- Trend Na in the setting of new lasix and spironolactone   -- IVF PRN       Cholelithiasis without cholecystitis  Patient had CTAP 6/23/23 showing cholelithiasis without cholecystitis and was referred to GI. She says that she was not a candidate for surgerical cholecystectomy due to her abdominal distension and liver cirrhosis so she was referred to hepatology. On admission to ED U/S showed "cholelithiasis without secondary signs of cholecystitis and common bile ducts ranging from 5-11 mm however there is no choledocholithiasis or other identified finding which may explain extrahepatic biliary duct dilatation.  Recommend CT abdomen pelvis with contrast for additional characterization."     -- CTAP:   Cholelithiasis without acute cholecystitis    Biliary tract: No intrahepatic or extrahepatic biliary ductal dilatation.     Gallbladder: There are radiodense gallstones.  There is no gallbladder wall thickening or pericholecystic fluid.     Pancreas: Normal. No pancreatic ductal dilatation.         Ascites  See cirrhosis of liver with ascites      Transaminitis  Patient has a chronic history of alcohol use disorder with CTAP from 6/23 showing a nodular liver architecture suggestive of cirrhosis. On admission AST 96 and ALT 32.     -- Trend CMP       Elevated TSH  Elevated TSH in the setting of normal T4. Likely subclinical hyperthyroidism. Recommend outpatient follow up.    TSH 7.443  Free T4 1.08    Hypomagnesemia  On admission patient has Mg 1.2 likely due to chronic malnutrition with possible cirrhosis in the setting " of alcohol use disorder.    -- 2g IV Mg given  -- Trend Mg and replete with goal Mg > 2  -- Mg 1.8 after repletion (7/15)         VTE Risk Mitigation (From admission, onward)         Ordered     IP VTE LOW RISK PATIENT  Once         07/14/23 1916     Place sequential compression device  Until discontinued         07/14/23 1916                Discharge Planning   SRI: 7/16/2023     Code Status: Full Code   Is the patient medically ready for discharge?: No    Reason for patient still in hospital (select all that apply): Treatment  Discharge Plan A: Home with family                  Vick Walters MD  Department of Hospital Medicine   [facility] - Telemetry Stepdown

## 2023-07-15 NOTE — ASSESSMENT & PLAN NOTE
On admission patient has Mg 1.2 likely due to chronic malnutrition with possible cirrhosis in the setting of alcohol use disorder.    -- 2g IV Mg given  -- Trend Mg and replete with goal Mg > 2  -- Mg 1.8 after repletion (7/15)

## 2023-07-15 NOTE — PLAN OF CARE
[facility] - Telemetry Stepdown  Initial Discharge Assessment       Primary Care Provider: Gogo Ramirez MD    Admission Diagnosis: Alcoholic cirrhosis of liver with ascites [K70.31]  Chest pain [R07.9]    Admission Date: 7/14/2023  Expected Discharge Date:     Transition of Care Barriers: Substance Abuse, Unisured    Payor: /     Extended Emergency Contact Information  Primary Emergency Contact: Jeffy Casper  Mobile Phone: [phone]  Relation: Spouse  Preferred language: English   needed? No    Discharge Plan A: Home with family  Discharge Plan B: Home      [id number] - [place], [place] - [address]  [address] [address]  Phone: [phone] Fax: [phone]    [id number] - [place], [place] - [address] [address]  Phone: [phone] Fax: [phone]    [id number] & [address] [address]  Phone: [phone] Fax: [phone]      Initial Assessment (most recent)       Adult Discharge Assessment - 07/15/23 1147          Discharge Assessment    Assessment Type Discharge Planning Assessment     Confirmed/corrected address, phone number and insurance Yes     Confirmed Demographics Correct on Facesheet     Source of Information patient     Does patient/caregiver understand observation status Yes     Communicated SRI with patient/caregiver Date not available/Unable to determine     Reason For Admission Cirrhosis of liver with ascites     People in Home alone   pt says she does not know where  is    Facility Arrived From: hom[facility]     Do you expect to return to your current living situation? Yes     Do you have help at home or someone to help you manage your care at home? No     Prior to hospitilization cognitive status: Alert/Oriented     Current cognitive status: Alert/Oriented      Walking or Climbing Stairs ambulation difficulty, requires equipment     Mobility Management RW     Home Accessibility --   elevator    Home Layout Able to live on 1st floor     Equipment Currently Used at Home walker, rolling     Readmission within 30 days? No     Patient currently being followed by outpatient case management? No     Do you currently have service(s) that help you manage your care at home? No     Do you take prescription medications? Yes     Do you have prescription coverage? Yes     Coverage BCBS     Do you have any problems affording any of your prescribed medications? No     Is the patient taking medications as prescribed? yes     Who is going to help you get home at discharge? family     How do you get to doctors appointments? family or friend will provide     Are you on dialysis? No     Do you take coumadin? No     Discharge Plan A Home with family     Discharge Plan B Home     DME Needed Upon Discharge  other (see comments)   TBD    Discharge Plan discussed with: Patient     Transition of Care Barriers Substance Abuse;Unisured        Physical Activity    On average, how many days per week do you engage in moderate to strenuous exercise (like a brisk walk)? 0 days     On average, how many minutes do you engage in exercise at this level? 0 min        Financial Resource Strain    How hard is it for you to pay for the very basics like food, housing, medical care, and heating? Not very hard        Housing Stability    In the last 12 months, was there a time when you were not able to pay the mortgage or rent on time? No     In the last 12 months, how many places have you lived? 1     In the last 12 months, was there a time when you did not have a steady place to sleep or slept in a shelter (including now)? No        Transportation Needs    In the past 12 months, has lack of transportation kept you from medical appointments or from getting medications? No     In the past 12 months, has lack of  transportation kept you from meetings, work, or from getting things needed for daily living? No        Food Insecurity    Within the past 12 months, you worried that your food would run out before you got the money to buy more. Never true     Within the past 12 months, the food you bought just didn't last and you didn't have money to get more. Never true        Stress    Do you feel stress - tense, restless, nervous, or anxious, or unable to sleep at night because your mind is troubled all the time - these days? To some extent        Social Connections    In a typical week, how many times do you talk on the phone with family, friends, or neighbors? Three times a week     How often do you get together with friends or relatives? Three times a week     How often do you attend Alevism or Restorationist services? Never     Do you belong to any clubs or organizations such as Alevism groups, unions, fraternal or athletic groups, or school groups? No     How often do you attend meetings of the clubs or organizations you belong to? Never     Are you , , , , never , or living with a partner?         Alcohol Use    Q1: How often do you have a drink containing alcohol? Never     Q2: How many drinks containing alcohol do you have on a typical day when you are drinking? Patient does not drink     Q3: How often do you have six or more drinks on one occasion? Never                   SW met with pt at bedside. Pt reported that she lives at face [facility] address alone. Reported that she was living with her  but she currently does not where he is. Pt reported that she has a RW at home and no other DME. No HH. No coumadin. No dialysis. Prefers [facility] on [address]. Will have ride home. Not working. Discussed pt being listed as self-pay. Pt reported that she still has her BCBS benefits  but does not have card. ERNST/QUE to follow.     Chelsea George, JANET, LCSW  Weekend -[facility] [address]  [facility] [phone]

## 2023-07-15 NOTE — ASSESSMENT & PLAN NOTE
Patient presenting with ascites and abdominal distension. Possible causes include decompensated cirrhosis or SBP.  Etiology: alcohol use disorder  Cirrhosis history: First imaging on CTAP 23  Alcohol history: 30+ years of social drinking, mostly mixed drinks that became heavier over the last decade. Patient stopped drinking 2 months ago.  Tylenol / Medication history: Labs negative for elevated acetaminophen.   CTAP Imagin. Morphologic changes of hepatic cirrhosis and findings of portal hypertension including moderate abdominopelvic ascites and upper abdominal varices including paraesophageal varices.   2. Ill-defined hypodensity in the right hepatic lobe highly concerning for hepatocellular carcinoma.  Further evaluation with nonemergent MRI of the abdomen with and without contrast is recommended.   3. Small left pleural effusion.   4. Cholelithiasis without acute cholecystitis.   5. Mild body wall anasarca.     Concern for SBP with leukocytosis. One dose of Rocephin given in ED. Hold abx until after paracentesis.     MELD 3.0: 16 at 7/15/2023  4:36 AM  MELD-Na: 11 at 7/15/2023  4:36 AM  Calculated from:  Serum Creatinine: 0.6 mg/dL (Using min of 1 mg/dL) at 7/15/2023  4:36 AM  Serum Sodium: 134 mmol/L at 7/15/2023  4:36 AM  Total Bilirubin: 1.5 mg/dL at 7/15/2023  4:36 AM  Serum Albumin: 2.5 g/dL at 7/15/2023  4:36 AM  INR(ratio): 1.3 at 7/15/2023  4:36 AM  Age at listing (hypothetical): 57 years  Sex: Female at 7/15/2023  4:36 AM     -- Lasix/Spironolactone 20/50 daily  -- Acute viral hepatitis panel ordered  -- Autoimmune panel (RAMÓN, anti-smooth, anti-martin) ordered  -- Diagnostic paracentesis w/ fluid studies to evaluate for SBP   -- A1AT ordered  -- Ceruloplasmin ordered  -- Iron study panel ordered  -- HSV, VZV, HIV ordered  -- Serum Tylenol level negative  -- Serum alcohol level negative  -- PETH ordered  -- Trend CMP LFTs / INR daily

## 2023-07-15 NOTE — ASSESSMENT & PLAN NOTE
Elevated TSH in the setting of normal T4. Likely subclinical hyperthyroidism. Recommend outpatient follow up.    TSH 7.443  Free T4 1.08

## 2023-07-15 NOTE — ASSESSMENT & PLAN NOTE
"Patient had CTAP 6/23/23 showing cholelithiasis without cholecystitis and was referred to GI. She says that she was not a candidate for surgerical cholecystectomy due to her abdominal distension and liver cirrhosis so she was referred to hepatology. On admission to ED U/S showed "cholelithiasis without secondary signs of cholecystitis and common bile ducts ranging from 5-11 mm however there is no choledocholithiasis or other identified finding which may explain extrahepatic biliary duct dilatation.  Recommend CT abdomen pelvis with contrast for additional characterization."     -- CTAP:   Cholelithiasis without acute cholecystitis    Biliary tract: No intrahepatic or extrahepatic biliary ductal dilatation.     Gallbladder: There are radiodense gallstones.  There is no gallbladder wall thickening or pericholecystic fluid.     Pancreas: Normal. No pancreatic ductal dilatation.       "

## 2023-07-15 NOTE — HOSPITAL COURSE
Following admission for abdominal distension and leukocytosis of 19.78 thought to be from a UTI or SBP. CTAP results showed symptoms were likely due to hepatic cirrhosis with additional findings of portal hypertension with moderate abdominopelvic ascites and upper abdominal varices including paraesophageal varices and an ll-defined hypodensity in the right hepatic lobe highly concerning for hepatocellular carcinoma. Cirrhosis panel was ordered and patient was started on lasix/spironolactone. Patient had 3.5 L of fluid drained with paracentesis on 7/15 and 2.8 L of fluid on 7/17 with fluid analysis negative for SBP, and SAAG indicates portal hypertension. Hepatology was consulted and recommended outpatient follow up along with MRI w wo contrast of abdomen. Patient to be discharged on 20 mg lasix and 50 mg spironolactone daily. Patient was discharged with oral antibiotics to complete course for UTI. Attempted to send home with home health, but due to lack of insurance coverage, was unable to be obtained. Patient to follow up with hepatology, primary care, and psychology after discharge.

## 2023-07-15 NOTE — ASSESSMENT & PLAN NOTE
On admission, WBC 19.78 likely 2/2 to UTI or SBP. UA and paracentesis ordered to assess source of infection.    --Trend CBC. WBC 20.05   --One dose rocephin given in ED. Abx on hold until after paracentesis  --Previous Urine culture 6/20 grew E. Coli

## 2023-07-15 NOTE — ASSESSMENT & PLAN NOTE
Patient presents with Na 131 likely secondary to suspected cirrhosis    -- Trend Na  -- IVF PRN

## 2023-07-15 NOTE — ASSESSMENT & PLAN NOTE
Patient with previous history of asymptomatic UTI with Ucx growing E. Coli.  Diagnostics: UA orange hazy appearnace, +1 protein, ketones +1, Bilirubin +1, leukocyte +2, RBC 13, WBC 23, bacteria present. , Ucx pending, CBC remarkable for WBC 19.78.    -- Antibiotics: 1 dose of Ceftriaxone in ED. Holding for paracentesis  -- Trend CBC  -- Strict I/Os  -- place on pure wick

## 2023-07-15 NOTE — HPI
"This is a 57 year old woman with PMH ADHD on Adderall daily, insomnia on clonidine, DJD (degenerative joint disease) of cervical spine, previous breast augmentation, chronic constipation and alcoholism. She presents to the ED with worsening abdominal swelling worsening over last 2 months, with acutely worsened dyspnea and midsternal chest pain in the last few days. She visited urgent care for the symptoms and was referred to GI and for imaging. She received CTAP w/wo contrast on 6/23/23 showing "Hepatomegaly. Liver nodularity raising question of cirrhosis. Mild ascites. Cholelithiasis without cholecystitis. Diverticulosis without diverticulitis. She saw GI and was referred to hepatology where she has a scheduled appointment next week. She presented to ED before this appointment because her abdominal distension is becoming increasingly painful, she does not want to eat because she has a feeling of fullness, and she is constipated with last bowel movement 10 days ago. She reports decades of social drinking, 5-8 drinks, that increased over the last decade. Since the distension and discomfort started 2 months ago she denies having any alcoholic drinks. She endorses shortness of breath on exertion and is using a walker, constipation, and abdominal pain. She denies fever, chills, sweating, nausea, vomiting, chest pain, palpitations, cough, dysuria, excessive bleeding or brusing.    On ED admission she received an abdominal U/S showing ascites, cholelithiasis without secondary signs of cholecystitis with CBD ranging from 5-11 mm with no choledocholithiasis or other explanation for extrahepatic biliary duct dilation. CTAP with contrast ordered and results pending.CXR showed: "Heart size normal.  Ground-glass opacification of the lower lung fields consistent with underlying small amount of pleural effusion and atelectatic changes.  The lung apices are clear". UA results: orange hazy appearnace, +1 protein, ketones +1, " Bilirubin +1, leukocyte +2, RBC 13, WBC 23, bacteria present, cultures sent. Labs notable for WBC 19.78, H/H 10.3/31.7, MCV 11, PT 13.9, INR 1.3, Na 131, CO2 20, Mg 1.2, Alk Jt 187, Tbili 2.2, Direct bili 0.8, AST 96, ALT 32 (wnl), TSH 7.443. Labs negative for elevated acetaminophen levels. EKG showed low voltage QRS in limb leads. IR paracentesis planned with fluid analysis to rule out SBP. Patient started on ceftriaxone in ED, spironolactone and lasix ordered on admission to floor.

## 2023-07-15 NOTE — ASSESSMENT & PLAN NOTE
On admission patient has Mg 1.2 likely due to chronic malnutrition with possible cirrhosis in the setting of alcohol use disorder.    -- 2g IV Mg given  -- Trend Mg and replete with goal Mg > 2      none

## 2023-07-15 NOTE — NURSING
Nurses Note -- 4 Eyes      7/15/2023   12:59 AM      Skin assessed during: Admit      [x] No Altered Skin Integrity Present    [x]Prevention Measures Documented      [] Yes- Altered Skin Integrity Present or Discovered   [] LDA Added if Not in Epic (Describe Wound)   [] New Altered Skin Integrity was Present on Admit and Documented in LDA   [] Wound Image Taken    Wound Care Consulted? No    Attending Nurse:  Denese Abadie, RN     Second RN/Staff Member:  Carolee PIPER RN

## 2023-07-15 NOTE — SUBJECTIVE & OBJECTIVE
Past Medical History:   Diagnosis Date    ADHD 10/5/2020    COVID-19 virus infection     DJD (degenerative joint disease) of cervical spine     Hx of psychiatric care     Sleep difficulties     Therapy        Past Surgical History:   Procedure Laterality Date    AUGMENTATION OF BREAST      SINUS SURGERY         Review of patient's allergies indicates:  No Known Allergies    No current facility-administered medications on file prior to encounter.     Current Outpatient Medications on File Prior to Encounter   Medication Sig    cloNIDine (CATAPRES) 0.1 MG tablet Take 1 tab by mouth each evening as needed for sleep    dextroamphetamine-amphetamine 30 mg Tab Take 1 tablet (30 mg total) by mouth Daily.    MAGNESIUM ORAL Take 300 mg by mouth once daily.    multivitamin (THERAGRAN) per tablet Take 1 tablet by mouth once daily.    thiamine 100 MG tablet Take 100 mg by mouth once daily.    dicyclomine (BENTYL) 20 mg tablet Take 1 tablet (20 mg total) by mouth 4 (four) times daily as needed (abdominal).    fluticasone propionate (FLONASE) 50 mcg/actuation nasal spray 2 sprays in each nostril daily (Patient not taking: Reported on 6/20/2023)    gabapentin (NEURONTIN) 300 MG capsule 2 capsules nightly (Patient not taking: Reported on 6/20/2023)    polyethylene glycol (GLYCOLAX) 17 gram PwPk Take 17 g by mouth 2 (two) times daily as needed (constipation; take with alot of water daily (atleast 100 ounces of fluid daily)).     Family History       Problem Relation (Age of Onset)    Asthma Mother    Coronary artery disease Father    Heart attacks under age 50 Father (45)    Hypertension Father    Stomach cancer Mother (70)          Tobacco Use    Smoking status: Never    Smokeless tobacco: Never   Substance and Sexual Activity    Alcohol use: Yes     Comment: Wine, typically 1 to 2 glasses    Drug use: Never    Sexual activity: Yes     Partners: Male     Review of Systems   Constitutional:  Positive for activity change and appetite  change. Negative for chills, diaphoresis, fatigue and fever.   HENT:  Positive for trouble swallowing. Negative for congestion, facial swelling, nosebleeds, postnasal drip, rhinorrhea, sinus pressure, sinus pain, sneezing and sore throat.    Eyes:  Negative for pain and visual disturbance.   Respiratory:  Positive for chest tightness, shortness of breath and wheezing. Negative for cough and choking.    Cardiovascular:  Negative for chest pain, palpitations and leg swelling.   Gastrointestinal:  Positive for abdominal distention, abdominal pain and constipation. Negative for anal bleeding, blood in stool, diarrhea, nausea, rectal pain and vomiting.   Genitourinary:  Negative for difficulty urinating, flank pain, frequency and urgency.   Musculoskeletal:  Positive for neck pain. Negative for arthralgias and back pain.   Skin:  Negative for color change and wound.   Neurological:  Positive for weakness. Negative for dizziness, seizures, syncope, light-headedness and headaches.   Hematological:  Does not bruise/bleed easily.   Psychiatric/Behavioral:  Negative for confusion.    Objective:     Vital Signs (Most Recent):  Temp: 97.4 °F (36.3 °C) (07/14/23 1341)  Pulse: (!) 112 (07/14/23 1832)  Resp: 16 (07/14/23 1341)  BP: 126/74 (07/14/23 1832)  SpO2: 97 % (07/14/23 1832) Vital Signs (24h Range):  Temp:  [97.4 °F (36.3 °C)] 97.4 °F (36.3 °C)  Pulse:  [109-135] 112  Resp:  [16] 16  SpO2:  [97 %-98 %] 97 %  BP: (105-135)/(74-86) 126/74     Weight: 58.2 kg (128 lb 4.9 oz)  Body mass index is 22.02 kg/m².     Physical Exam  Vitals and nursing note reviewed.   Constitutional:       Appearance: She is normal weight. She is ill-appearing.      Comments: Muscle wasting on limbs   HENT:      Head: Normocephalic and atraumatic.      Right Ear: External ear normal.      Left Ear: External ear normal.      Nose: Nose normal. No congestion or rhinorrhea.      Mouth/Throat:      Mouth: Mucous membranes are moist.      Pharynx:  Oropharynx is clear. No oropharyngeal exudate.   Eyes:      General: Scleral icterus present.         Right eye: No discharge.         Left eye: No discharge.      Extraocular Movements: Extraocular movements intact.      Pupils: Pupils are equal, round, and reactive to light.   Cardiovascular:      Rate and Rhythm: Tachycardia present.      Pulses: Normal pulses.      Heart sounds: Normal heart sounds.   Pulmonary:      Effort: Pulmonary effort is normal.      Breath sounds: Rales present.   Abdominal:      General: There is distension.      Tenderness: There is abdominal tenderness. There is no right CVA tenderness or left CVA tenderness.      Comments: Shifting dullness and fluid wave present   Musculoskeletal:      Cervical back: Normal range of motion.      Right lower leg: No edema.      Left lower leg: No edema.   Skin:     Coloration: Skin is pale.      Comments: Diffuse spider aniomas    Neurological:      General: No focal deficit present.      Mental Status: She is alert and oriented to person, place, and time.      Comments: No asterixis   Psychiatric:         Mood and Affect: Mood normal.            CRANIAL NERVES     CN III, IV, VI   Pupils are equal, round, and reactive to light.     Significant Labs: All pertinent labs within the past 24 hours have been reviewed.  Bilirubin:   Recent Labs   Lab 06/20/23  1804 07/14/23  1455   BILIDIR  --  0.8*   BILITOT 2.1* 2.2*     CBC:   Recent Labs   Lab 07/14/23  1455   WBC 19.78*   HGB 10.3*   HCT 31.7*        CMP:   Recent Labs   Lab 07/14/23  1455   *   K 4.1      CO2 20*   GLU 92   BUN 10   CREATININE 0.6   CALCIUM 9.1   PROT 7.0   ALBUMIN 2.7*   BILITOT 2.2*   ALKPHOS 187*   AST 96*   ALT 32   ANIONGAP 10     TSH:   Recent Labs   Lab 07/14/23  1455   TSH 7.443*     Urine Studies:   Recent Labs   Lab 07/14/23  1631   COLORU [place]*   APPEARANCEUA Hazy*   PHUR 6.0   SPECGRAV 1.025   PROTEINUA 1+*   GLUCUA Negative   KETONESU 1+*    BILIRUBINUA 1+*   OCCULTUA Negative   NITRITE Negative   LEUKOCYTESUR 2+*   RBCUA 13*   WBCUA 23*   BACTERIA Many*   SQUAMEPITHEL 13   HYALINECASTS 0       Significant Imaging: I have reviewed all pertinent imaging results/findings within the past 24 hours.

## 2023-07-16 LAB
AFP SERPL-MCNC: 5.3 NG/ML (ref 0–8.4)
ALBUMIN SERPL BCP-MCNC: 2.4 G/DL (ref 3.5–5.2)
ALP SERPL-CCNC: 165 U/L (ref 55–135)
ALT SERPL W/O P-5'-P-CCNC: 28 U/L (ref 10–44)
ANION GAP SERPL CALC-SCNC: 10 MMOL/L (ref 8–16)
AST SERPL-CCNC: 76 U/L (ref 10–40)
BACTERIA UR CULT: ABNORMAL
BASOPHILS # BLD AUTO: 0.13 K/UL (ref 0–0.2)
BASOPHILS NFR BLD: 0.7 % (ref 0–1.9)
BILIRUB SERPL-MCNC: 1.5 MG/DL (ref 0.1–1)
BUN SERPL-MCNC: 6 MG/DL (ref 6–20)
CALCIUM SERPL-MCNC: 8.8 MG/DL (ref 8.7–10.5)
CHLORIDE SERPL-SCNC: 105 MMOL/L (ref 95–110)
CO2 SERPL-SCNC: 20 MMOL/L (ref 23–29)
CREAT SERPL-MCNC: 0.6 MG/DL (ref 0.5–1.4)
DIFFERENTIAL METHOD: ABNORMAL
EOSINOPHIL # BLD AUTO: 0.7 K/UL (ref 0–0.5)
EOSINOPHIL NFR BLD: 4.1 % (ref 0–8)
ERYTHROCYTE [DISTWIDTH] IN BLOOD BY AUTOMATED COUNT: 17.7 % (ref 11.5–14.5)
EST. GFR  (NO RACE VARIABLE): >60 ML/MIN/1.73 M^2
GLUCOSE SERPL-MCNC: 106 MG/DL (ref 70–110)
HCT VFR BLD AUTO: 28.9 % (ref 37–48.5)
HGB BLD-MCNC: 9.6 G/DL (ref 12–16)
IMM GRANULOCYTES # BLD AUTO: 0.09 K/UL (ref 0–0.04)
IMM GRANULOCYTES NFR BLD AUTO: 0.5 % (ref 0–0.5)
INR PPP: 1.3 (ref 0.8–1.2)
LYMPHOCYTES # BLD AUTO: 2.3 K/UL (ref 1–4.8)
LYMPHOCYTES NFR BLD: 12.9 % (ref 18–48)
MAGNESIUM SERPL-MCNC: 1.3 MG/DL (ref 1.6–2.6)
MCH RBC QN AUTO: 36.4 PG (ref 27–31)
MCHC RBC AUTO-ENTMCNC: 33.2 G/DL (ref 32–36)
MCV RBC AUTO: 110 FL (ref 82–98)
MONOCYTES # BLD AUTO: 1.2 K/UL (ref 0.3–1)
MONOCYTES NFR BLD: 6.6 % (ref 4–15)
NEUTROPHILS # BLD AUTO: 13.3 K/UL (ref 1.8–7.7)
NEUTROPHILS NFR BLD: 75.2 % (ref 38–73)
NRBC BLD-RTO: 0 /100 WBC
PHOSPHATE SERPL-MCNC: 2.6 MG/DL (ref 2.7–4.5)
PLATELET # BLD AUTO: 292 K/UL (ref 150–450)
PMV BLD AUTO: 9.6 FL (ref 9.2–12.9)
POTASSIUM SERPL-SCNC: 3.3 MMOL/L (ref 3.5–5.1)
PROT SERPL-MCNC: 5.8 G/DL (ref 6–8.4)
PROTHROMBIN TIME: 14 SEC (ref 9–12.5)
RBC # BLD AUTO: 2.64 M/UL (ref 4–5.4)
SODIUM SERPL-SCNC: 135 MMOL/L (ref 136–145)
WBC # BLD AUTO: 17.75 K/UL (ref 3.9–12.7)

## 2023-07-16 PROCEDURE — 63600175 PHARM REV CODE 636 W HCPCS: Performed by: HOSPITALIST

## 2023-07-16 PROCEDURE — 25000003 PHARM REV CODE 250

## 2023-07-16 PROCEDURE — 20600001 HC STEP DOWN PRIVATE ROOM

## 2023-07-16 PROCEDURE — 83735 ASSAY OF MAGNESIUM: CPT

## 2023-07-16 PROCEDURE — 96366 THER/PROPH/DIAG IV INF ADDON: CPT

## 2023-07-16 PROCEDURE — 96376 TX/PRO/DX INJ SAME DRUG ADON: CPT

## 2023-07-16 PROCEDURE — 80053 COMPREHEN METABOLIC PANEL: CPT

## 2023-07-16 PROCEDURE — 82105 ALPHA-FETOPROTEIN SERUM: CPT | Performed by: HOSPITALIST

## 2023-07-16 PROCEDURE — 85610 PROTHROMBIN TIME: CPT

## 2023-07-16 PROCEDURE — 36415 COLL VENOUS BLD VENIPUNCTURE: CPT

## 2023-07-16 PROCEDURE — 99233 SBSQ HOSP IP/OBS HIGH 50: CPT | Mod: 25,GC,, | Performed by: HOSPITALIST

## 2023-07-16 PROCEDURE — 63600175 PHARM REV CODE 636 W HCPCS

## 2023-07-16 PROCEDURE — 84100 ASSAY OF PHOSPHORUS: CPT

## 2023-07-16 PROCEDURE — 99233 PR SUBSEQUENT HOSPITAL CARE,LEVL III: ICD-10-PCS | Mod: 25,GC,, | Performed by: HOSPITALIST

## 2023-07-16 PROCEDURE — 85025 COMPLETE CBC W/AUTO DIFF WBC: CPT

## 2023-07-16 RX ORDER — MAGNESIUM SULFATE HEPTAHYDRATE 40 MG/ML
2 INJECTION, SOLUTION INTRAVENOUS ONCE
Status: COMPLETED | OUTPATIENT
Start: 2023-07-16 | End: 2023-07-16

## 2023-07-16 RX ORDER — FOLIC ACID 1 MG/1
1 TABLET ORAL DAILY
Status: DISCONTINUED | OUTPATIENT
Start: 2023-07-16 | End: 2023-07-18 | Stop reason: HOSPADM

## 2023-07-16 RX ORDER — POTASSIUM CHLORIDE 20 MEQ/1
40 TABLET, EXTENDED RELEASE ORAL ONCE
Status: COMPLETED | OUTPATIENT
Start: 2023-07-16 | End: 2023-07-16

## 2023-07-16 RX ORDER — CLONIDINE HYDROCHLORIDE 0.1 MG/1
0.1 TABLET ORAL DAILY PRN
Status: DISCONTINUED | OUTPATIENT
Start: 2023-07-16 | End: 2023-07-18 | Stop reason: HOSPADM

## 2023-07-16 RX ORDER — SODIUM,POTASSIUM PHOSPHATES 280-250MG
2 POWDER IN PACKET (EA) ORAL ONCE
Status: COMPLETED | OUTPATIENT
Start: 2023-07-16 | End: 2023-07-16

## 2023-07-16 RX ORDER — FUROSEMIDE 10 MG/ML
20 INJECTION INTRAMUSCULAR; INTRAVENOUS 2 TIMES DAILY
Status: DISCONTINUED | OUTPATIENT
Start: 2023-07-16 | End: 2023-07-17

## 2023-07-16 RX ORDER — POTASSIUM CHLORIDE 750 MG/1
10 CAPSULE, EXTENDED RELEASE ORAL DAILY
Status: DISCONTINUED | OUTPATIENT
Start: 2023-07-17 | End: 2023-07-18 | Stop reason: HOSPADM

## 2023-07-16 RX ADMIN — THIAMINE HCL TAB 100 MG 100 MG: 100 TAB at 09:07

## 2023-07-16 RX ADMIN — SENNOSIDES AND DOCUSATE SODIUM 1 TABLET: 50; 8.6 TABLET ORAL at 09:07

## 2023-07-16 RX ADMIN — FOLIC ACID 1 MG: 1 TABLET ORAL at 09:07

## 2023-07-16 RX ADMIN — THERA TABS 1 TABLET: TAB at 09:07

## 2023-07-16 RX ADMIN — SPIRONOLACTONE 50 MG: 25 TABLET, FILM COATED ORAL at 09:07

## 2023-07-16 RX ADMIN — POTASSIUM CHLORIDE 40 MEQ: 1500 TABLET, EXTENDED RELEASE ORAL at 09:07

## 2023-07-16 RX ADMIN — POTASSIUM & SODIUM PHOSPHATES POWDER PACK 280-160-250 MG 2 PACKET: 280-160-250 PACK at 12:07

## 2023-07-16 RX ADMIN — CLONIDINE HYDROCHLORIDE 0.1 MG: 0.1 TABLET ORAL at 09:07

## 2023-07-16 RX ADMIN — Medication 6 MG: at 09:07

## 2023-07-16 RX ADMIN — CEFTRIAXONE 1 G: 1 INJECTION, POWDER, FOR SOLUTION INTRAMUSCULAR; INTRAVENOUS at 10:07

## 2023-07-16 RX ADMIN — MAGNESIUM SULFATE 2 G: 2 INJECTION INTRAVENOUS at 09:07

## 2023-07-16 RX ADMIN — FUROSEMIDE 20 MG: 10 INJECTION, SOLUTION INTRAMUSCULAR; INTRAVENOUS at 05:07

## 2023-07-16 RX ADMIN — FUROSEMIDE 20 MG: 10 INJECTION, SOLUTION INTRAMUSCULAR; INTRAVENOUS at 09:07

## 2023-07-16 RX ADMIN — LACTULOSE 20 G: 20 SOLUTION ORAL at 09:07

## 2023-07-16 NOTE — ASSESSMENT & PLAN NOTE
Patient with previous history of asymptomatic UTI with Ucx growing E. Coli.  Diagnostics: UA orange hazy appearnace, +1 protein, ketones +1, Bilirubin +1, leukocyte +2, RBC 13, WBC 23, bacteria present. , Ucx pending, CBC remarkable for WBC 19.78 on admission.    -- Cultures grew gram negative rods  -- Antibiotics: ceftriaxone 1g daily  -- Trend CBC. WBC down trending  -- Strict I/Os  -- place on pure wick

## 2023-07-16 NOTE — PLAN OF CARE
Problem: Adult Inpatient Plan of Care  Goal: Plan of Care Review  Outcome: Ongoing, Progressing  Goal: Patient-Specific Goal (Individualized)  Outcome: Ongoing, Progressing  Goal: Absence of Hospital-Acquired Illness or Injury  Outcome: Ongoing, Progressing  Goal: Optimal Comfort and Wellbeing  Outcome: Ongoing, Progressing  Goal: Readiness for Transition of Care  Outcome: Ongoing, Progressing     Problem: Electrolyte Imbalance  Goal: Electrolyte Balance  Outcome: Ongoing, Progressing

## 2023-07-16 NOTE — SUBJECTIVE & OBJECTIVE
Interval History: No acute events overnight.    Review of Systems   Constitutional:  Positive for activity change and appetite change. Negative for chills, diaphoresis, fatigue and fever.   HENT:  Negative for congestion, facial swelling, nosebleeds, postnasal drip, rhinorrhea, sinus pressure, sinus pain, sneezing, sore throat and trouble swallowing.    Eyes:  Negative for pain and visual disturbance.   Respiratory:  Negative for cough, choking, chest tightness, shortness of breath and wheezing.    Cardiovascular:  Negative for chest pain, palpitations and leg swelling.   Gastrointestinal:  Positive for abdominal distention and abdominal pain. Negative for anal bleeding, blood in stool, constipation, diarrhea, nausea, rectal pain and vomiting.   Genitourinary:  Negative for difficulty urinating, flank pain, frequency and urgency.   Musculoskeletal:  Negative for arthralgias and back pain.   Skin:  Negative for color change and wound.   Neurological:  Negative for dizziness, seizures, syncope, weakness, light-headedness and headaches.   Hematological:  Does not bruise/bleed easily.   Psychiatric/Behavioral:  Negative for confusion.    Objective:     Vital Signs (Most Recent):  Temp: 98 °F (36.7 °C) (07/16/23 1203)  Pulse: (!) 123 (07/16/23 1203)  Resp: 18 (07/16/23 0055)  BP: 124/86 (07/16/23 1203)  SpO2: 96 % (07/16/23 1203) Vital Signs (24h Range):  Temp:  [97.8 °F (36.6 °C)-98.5 °F (36.9 °C)] 98 °F (36.7 °C)  Pulse:  [109-123] 123  Resp:  [18] 18  SpO2:  [94 %-96 %] 96 %  BP: (108-125)/(71-86) 124/86     Weight: 58 kg (127 lb 13.9 oz)  Body mass index is 21.95 kg/m².    Intake/Output Summary (Last 24 hours) at 7/16/2023 1345  Last data filed at 7/15/2023 1938  Gross per 24 hour   Intake 360 ml   Output 2 ml   Net 358 ml         Physical Exam  Vitals and nursing note reviewed.   Constitutional:       Appearance: She is normal weight. She is ill-appearing.      Comments: Muscle wasting on limbs   HENT:      Head:  Normocephalic and atraumatic.      Right Ear: External ear normal.      Left Ear: External ear normal.      Nose: Nose normal. No congestion or rhinorrhea.      Mouth/Throat:      Mouth: Mucous membranes are moist.      Pharynx: Oropharynx is clear. No oropharyngeal exudate.   Eyes:      General: Scleral icterus present.         Right eye: No discharge.         Left eye: No discharge.      Extraocular Movements: Extraocular movements intact.      Pupils: Pupils are equal, round, and reactive to light.   Cardiovascular:      Rate and Rhythm: Tachycardia present.      Pulses: Normal pulses.      Heart sounds: Normal heart sounds.   Pulmonary:      Effort: Pulmonary effort is normal.      Breath sounds: No rales.   Abdominal:      General: There is distension (improved after paracentesis).      Tenderness: There is abdominal tenderness. There is no right CVA tenderness or left CVA tenderness.      Comments: Shifting dullness and fluid wave present   Musculoskeletal:      Cervical back: Normal range of motion.      Right lower leg: No edema.      Left lower leg: No edema.   Skin:     Coloration: Skin is pale.      Comments: Diffuse spider aniomas    Neurological:      General: No focal deficit present.      Mental Status: She is alert and oriented to person, place, and time.      Comments: No asterixis   Psychiatric:         Mood and Affect: Mood normal.           Significant Labs: All pertinent labs within the past 24 hours have been reviewed.  CBC:   Recent Labs   Lab 07/14/23  1455 07/15/23  0436 07/16/23  0453   WBC 19.78* 20.05* 17.75*   HGB 10.3* 9.5* 9.6*   HCT 31.7* 27.6* 28.9*    284 292     CMP:   Recent Labs   Lab 07/14/23  1455 07/15/23  0436 07/16/23  0453   * 134* 135*   K 4.1 3.4* 3.3*    100 105   CO2 20* 21* 20*   GLU 92 87 106   BUN 10 9 6   CREATININE 0.6 0.6 0.6   CALCIUM 9.1 9.1 8.8   PROT 7.0 6.2 5.8*   ALBUMIN 2.7* 2.5* 2.4*   BILITOT 2.2* 1.5* 1.5*   ALKPHOS 187* 186* 165*    AST 96* 93* 76*   ALT 32 31 28   ANIONGAP 10 13 10       Significant Imaging: I have reviewed all pertinent imaging results/findings within the past 24 hours.

## 2023-07-16 NOTE — NURSING
RRN IV START       Contacted by RN for IV start for magnesium replacement.  20g placed to right forearm.   Please call Rapid Response RN, Jenna Bingham RN with any questions or concerns at [phone].

## 2023-07-16 NOTE — ASSESSMENT & PLAN NOTE
"Patient had CTAP 6/23/23 showing cholelithiasis without cholecystitis and was referred to GI. She says that she was not a candidate for surgerical cholecystectomy due to her abdominal distension and liver cirrhosis so she was referred to hepatology. On admission to ED U/S showed "cholelithiasis without secondary signs of cholecystitis and common bile ducts ranging from 5-11 mm however there is no choledocholithiasis or other identified finding which may explain extrahepatic biliary duct dilatation.  Recommend CT abdomen pelvis with contrast for additional characterization."     Will need to follow up outpatient once liver pathology is under control.     -- CTAP:   Cholelithiasis without acute cholecystitis    Biliary tract: No intrahepatic or extrahepatic biliary ductal dilatation.     Gallbladder: There are radiodense gallstones.  There is no gallbladder wall thickening or pericholecystic fluid.     Pancreas: Normal. No pancreatic ductal dilatation.       "

## 2023-07-16 NOTE — ASSESSMENT & PLAN NOTE
Patient presenting with ascites and abdominal distension. Likely secondary to cirrhosis secondary to ethanol use given history.   Cirrhosis history: First imaging on CTAP 23  Alcohol history: 30+ years of social drinking, mostly mixed drinks that became heavier over the last decade. Patient stopped drinking 2 months ago.  Tylenol / Medication history: Labs negative for elevated acetaminophen.   CTAP Imagin. Morphologic changes of hepatic cirrhosis and findings of portal hypertension including moderate abdominopelvic ascites and upper abdominal varices including paraesophageal varices.   2. Ill-defined hypodensity in the right hepatic lobe highly concerning for hepatocellular carcinoma.  Further evaluation with nonemergent MRI of the abdomen with and without contrast is recommended.   3. Small left pleural effusion.   4. Cholelithiasis without acute cholecystitis.   5. Mild body wall anasarca.     Concern for SBP with leukocytosis. One dose of Rocephin given in ED. Hold abx until after paracentesis.     MELD 3.0: 15 at 2023  4:53 AM  MELD-Na: 11 at 2023  4:53 AM  Calculated from:  Serum Creatinine: 0.6 mg/dL (Using min of 1 mg/dL) at 2023  4:53 AM  Serum Sodium: 135 mmol/L at 2023  4:53 AM  Total Bilirubin: 1.5 mg/dL at 2023  4:53 AM  Serum Albumin: 2.4 g/dL at 2023  4:53 AM  INR(ratio): 1.3 at 2023  4:53 AM  Age at listing (hypothetical): 57 years  Sex: Female at 2023  4:53 AM     -- Lasix 20 BID, Spironolactone 50 daily  -- Viral serolgy negative, ferritin elevated but not extremely high, ceruloplasmin normal, alpha 1 antitrypsin high  -- Paracentesis fluid analysis: Ascites wbc 77, glucose 124, albumin 0.6. SAAG 1.9. Portal hypertension without SBP  -- Trend CMP LFTs / INR daily  -- Will need hepatology either inpatient or outpatient pending symptom control. Consult placed for   -- Daily weight and strict I/Os

## 2023-07-16 NOTE — ASSESSMENT & PLAN NOTE
On admission, WBC 19.78 likely 2/2 to UTI or SBP. UA and paracentesis ordered to assess source of infection.    --Trend CBC. Down trending  --One dose rocephin given in ED. Abx resumed after paracentesis  --Urine cultures positive for gram negative rebel

## 2023-07-16 NOTE — PROGRESS NOTES
"[facility] - Tele[facility] Medicine  Progress Note    Patient Name: Claire Casper  MRN: [id number]  Patient Class: OP- Observation   Admission Date: 7/14/2023  Length of Stay: 0 days  Attending Physician: Stacey Ness MD  Primary Care Provider: Gogo Ramirez MD        Subjective:     Principal Problem:Cirrhosis of liver with ascites        HPI:  This is a 57 year old woman with PMH ADHD on Adderall daily, insomnia on clonidine, DJD (degenerative joint disease) of cervical spine, previous breast augmentation, chronic constipation and alcoholism. She presents to the ED with worsening abdominal swelling worsening over last 2 months, with acutely worsened dyspnea and midsternal chest pain in the last few days. She visited urgent care for the symptoms and was referred to GI and for imaging. She received CTAP w/wo contrast on 6/23/23 showing "Hepatomegaly. Liver nodularity raising question of cirrhosis. Mild ascites. Cholelithiasis without cholecystitis. Diverticulosis without diverticulitis. She saw GI and was referred to hepatology where she has a scheduled appointment next week. She presented to ED before this appointment because her abdominal distension is becoming increasingly painful, she does not want to eat because she has a feeling of fullness, and she is constipated with last bowel movement 10 days ago. She reports decades of social drinking, 5-8 drinks, that increased over the last decade. Since the distension and discomfort started 2 months ago she denies having any alcoholic drinks. She endorses shortness of breath on exertion and is using a walker, constipation, and abdominal pain. She denies fever, chills, sweating, nausea, vomiting, chest pain, palpitations, cough, dysuria, excessive bleeding or brusing.    On ED admission she received an abdominal U/S showing ascites, cholelithiasis without secondary signs of cholecystitis with CBD ranging from 5-11 mm with no " "choledocholithiasis or other explanation for extrahepatic biliary duct dilation. CTAP with contrast ordered and results pending.CXR showed: "Heart size normal.  Ground-glass opacification of the lower lung fields consistent with underlying small amount of pleural effusion and atelectatic changes.  The lung apices are clear". UA results: orange hazy appearnace, +1 protein, ketones +1, Bilirubin +1, leukocyte +2, RBC 13, WBC 23, bacteria present, cultures sent. Labs notable for WBC 19.78, H/H 10.3/31.7, MCV 11, PT 13.9, INR 1.3, Na 131, CO2 20, Mg 1.2, Alk Jt 187, Tbili 2.2, Direct bili 0.8, AST 96, ALT 32 (wnl), TSH 7.443. Labs negative for elevated acetaminophen levels. EKG showed low voltage QRS in limb leads. IR paracentesis planned with fluid analysis to rule out SBP. Patient started on ceftriaxone in ED, spironolactone and lasix ordered on admission to floor.        Overview/Hospital Course:  Following admission for abdominal distension and leukocytosis of 19.78 thought to be from a UTI or SBP. CTAP results showed symptoms were likely due to hepatic cirrhosis with additional findings of portal hypertension with moderate abdominopelvic ascites and upper abdominal varices including paraesophageal varices and an ll-defined hypodensity in the right hepatic lobe highly concerning for hepatocellular carcinoma. Cirrhosis panel was ordered and patient was started on lasix/spironolactone. Patient had 3.5 L of fluid drained with paracentesis and fluid analysis is negative for SBP, but SAAG indicates portal hypertension. Continue to treat UTI with ceftriaxone for growth of gram negative rods.         Interval History: No acute events overnight.    Review of Systems   Constitutional:  Positive for activity change and appetite change. Negative for chills, diaphoresis, fatigue and fever.   HENT:  Negative for congestion, facial swelling, nosebleeds, postnasal drip, rhinorrhea, sinus pressure, sinus pain, sneezing, sore " throat and trouble swallowing.    Eyes:  Negative for pain and visual disturbance.   Respiratory:  Negative for cough, choking, chest tightness, shortness of breath and wheezing.    Cardiovascular:  Negative for chest pain, palpitations and leg swelling.   Gastrointestinal:  Positive for abdominal distention and abdominal pain. Negative for anal bleeding, blood in stool, constipation, diarrhea, nausea, rectal pain and vomiting.   Genitourinary:  Negative for difficulty urinating, flank pain, frequency and urgency.   Musculoskeletal:  Negative for arthralgias and back pain.   Skin:  Negative for color change and wound.   Neurological:  Negative for dizziness, seizures, syncope, weakness, light-headedness and headaches.   Hematological:  Does not bruise/bleed easily.   Psychiatric/Behavioral:  Negative for confusion.    Objective:     Vital Signs (Most Recent):  Temp: 98 °F (36.7 °C) (07/16/23 1203)  Pulse: (!) 123 (07/16/23 1203)  Resp: 18 (07/16/23 0055)  BP: 124/86 (07/16/23 1203)  SpO2: 96 % (07/16/23 1203) Vital Signs (24h Range):  Temp:  [97.8 °F (36.6 °C)-98.5 °F (36.9 °C)] 98 °F (36.7 °C)  Pulse:  [109-123] 123  Resp:  [18] 18  SpO2:  [94 %-96 %] 96 %  BP: (108-125)/(71-86) 124/86     Weight: 58 kg (127 lb 13.9 oz)  Body mass index is 21.95 kg/m².    Intake/Output Summary (Last 24 hours) at 7/16/2023 1345  Last data filed at 7/15/2023 1938  Gross per 24 hour   Intake 360 ml   Output 2 ml   Net 358 ml         Physical Exam  Vitals and nursing note reviewed.   Constitutional:       Appearance: She is normal weight. She is ill-appearing.      Comments: Muscle wasting on limbs   HENT:      Head: Normocephalic and atraumatic.      Right Ear: External ear normal.      Left Ear: External ear normal.      Nose: Nose normal. No congestion or rhinorrhea.      Mouth/Throat:      Mouth: Mucous membranes are moist.      Pharynx: Oropharynx is clear. No oropharyngeal exudate.   Eyes:      General: Scleral icterus present.          Right eye: No discharge.         Left eye: No discharge.      Extraocular Movements: Extraocular movements intact.      Pupils: Pupils are equal, round, and reactive to light.   Cardiovascular:      Rate and Rhythm: Tachycardia present.      Pulses: Normal pulses.      Heart sounds: Normal heart sounds.   Pulmonary:      Effort: Pulmonary effort is normal.      Breath sounds: No rales.   Abdominal:      General: There is distension (improved after paracentesis).      Tenderness: There is abdominal tenderness. There is no right CVA tenderness or left CVA tenderness.      Comments: Shifting dullness and fluid wave present   Musculoskeletal:      Cervical back: Normal range of motion.      Right lower leg: No edema.      Left lower leg: No edema.   Skin:     Coloration: Skin is pale.      Comments: Diffuse spider aniomas    Neurological:      General: No focal deficit present.      Mental Status: She is alert and oriented to person, place, and time.      Comments: No asterixis   Psychiatric:         Mood and Affect: Mood normal.           Significant Labs: All pertinent labs within the past 24 hours have been reviewed.  CBC:   Recent Labs   Lab 07/14/23  1455 07/15/23  0436 07/16/23  0453   WBC 19.78* 20.05* 17.75*   HGB 10.3* 9.5* 9.6*   HCT 31.7* 27.6* 28.9*    284 292     CMP:   Recent Labs   Lab 07/14/23  1455 07/15/23  0436 07/16/23  0453   * 134* 135*   K 4.1 3.4* 3.3*    100 105   CO2 20* 21* 20*   GLU 92 87 106   BUN 10 9 6   CREATININE 0.6 0.6 0.6   CALCIUM 9.1 9.1 8.8   PROT 7.0 6.2 5.8*   ALBUMIN 2.7* 2.5* 2.4*   BILITOT 2.2* 1.5* 1.5*   ALKPHOS 187* 186* 165*   AST 96* 93* 76*   ALT 32 31 28   ANIONGAP 10 13 10       Significant Imaging: I have reviewed all pertinent imaging results/findings within the past 24 hours.      Assessment/Plan:      * Cirrhosis of liver with ascites  Patient presenting with ascites and abdominal distension. Likely secondary to cirrhosis secondary to  ethanol use given history.   Cirrhosis history: First imaging on CTAP 23  Alcohol history: 30+ years of social drinking, mostly mixed drinks that became heavier over the last decade. Patient stopped drinking 2 months ago.  Tylenol / Medication history: Labs negative for elevated acetaminophen.   CTAP Imagin. Morphologic changes of hepatic cirrhosis and findings of portal hypertension including moderate abdominopelvic ascites and upper abdominal varices including paraesophageal varices.   2. Ill-defined hypodensity in the right hepatic lobe highly concerning for hepatocellular carcinoma.  Further evaluation with nonemergent MRI of the abdomen with and without contrast is recommended.   3. Small left pleural effusion.   4. Cholelithiasis without acute cholecystitis.   5. Mild body wall anasarca.     Concern for SBP with leukocytosis. One dose of Rocephin given in ED. Hold abx until after paracentesis.     MELD 3.0: 15 at 2023  4:53 AM  MELD-Na: 11 at 2023  4:53 AM  Calculated from:  Serum Creatinine: 0.6 mg/dL (Using min of 1 mg/dL) at 2023  4:53 AM  Serum Sodium: 135 mmol/L at 2023  4:53 AM  Total Bilirubin: 1.5 mg/dL at 2023  4:53 AM  Serum Albumin: 2.4 g/dL at 2023  4:53 AM  INR(ratio): 1.3 at 2023  4:53 AM  Age at listing (hypothetical): 57 years  Sex: Female at 2023  4:53 AM     -- Lasix 20 BID, Spironolactone 50 daily  -- Viral serolgy negative, ferritin elevated but not extremely high, ceruloplasmin normal, alpha 1 antitrypsin high  -- Paracentesis fluid analysis: Ascites wbc 77, glucose 124, albumin 0.6. SAAG 1.9. Portal hypertension without SBP  -- Trend CMP LFTs / INR daily  -- Will need hepatology either inpatient or outpatient pending symptom control. Consult placed for   -- Daily weight and strict I/Os            Leukocytosis  On admission, WBC 19.78 likely 2/2 to UTI or SBP. UA and paracentesis ordered to assess source of infection.    --Trend CBC.  "Down trending  --One dose rocephin given in ED. Abx resumed after paracentesis  --Urine cultures positive for gram negative rebel      UTI (urinary tract infection)  Patient with previous history of asymptomatic UTI with Ucx growing E. Coli.  Diagnostics: UA orange hazy appearnace, +1 protein, ketones +1, Bilirubin +1, leukocyte +2, RBC 13, WBC 23, bacteria present. , Ucx pending, CBC remarkable for WBC 19.78 on admission.    -- Cultures grew gram negative rods  -- Antibiotics: ceftriaxone 1g daily  -- Trend CBC. WBC down trending  -- Strict I/Os  -- place on pure wick      Hyponatremia  Patient presents with Na 131 likely secondary to suspected cirrhosis.     -- Trend Na in the setting of new lasix and spironolactone   -- IVF PRN       Cholelithiasis without cholecystitis  Patient had CTAP 6/23/23 showing cholelithiasis without cholecystitis and was referred to GI. She says that she was not a candidate for surgerical cholecystectomy due to her abdominal distension and liver cirrhosis so she was referred to hepatology. On admission to ED U/S showed "cholelithiasis without secondary signs of cholecystitis and common bile ducts ranging from 5-11 mm however there is no choledocholithiasis or other identified finding which may explain extrahepatic biliary duct dilatation.  Recommend CT abdomen pelvis with contrast for additional characterization."     Will need to follow up outpatient once liver pathology is under control.     -- CTAP:   Cholelithiasis without acute cholecystitis    Biliary tract: No intrahepatic or extrahepatic biliary ductal dilatation.     Gallbladder: There are radiodense gallstones.  There is no gallbladder wall thickening or pericholecystic fluid.     Pancreas: Normal. No pancreatic ductal dilatation.         Ascites  See cirrhosis of liver with ascites      Transaminitis  Patient has a chronic history of alcohol use disorder with CTAP from 6/23 showing a nodular liver architecture suggestive of " cirrhosis. On admission AST 96 and ALT 32.     -- Trend CMP       Elevated TSH  Elevated TSH in the setting of normal T4. Likely subclinical hyperthyroidism. Recommend outpatient follow up.    TSH 7.443  Free T4 1.08    Hypomagnesemia  On admission patient has Mg 1.2 likely due to chronic malnutrition with possible cirrhosis in the setting of alcohol use disorder.    -- 2g IV Mg given  -- Trend Mg and replete with goal Mg > 2        VTE Risk Mitigation (From admission, onward)         Ordered     IP VTE LOW RISK PATIENT  Once         07/14/23 1916     Place sequential compression device  Until discontinued         07/14/23 1916                Discharge Planning   SRI: 7/17/2023     Code Status: Full Code   Is the patient medically ready for discharge?: No    Reason for patient still in hospital (select all that apply): Treatment  Discharge Plan A: Home with family                  Vick Walters MD  Department of Hospital Medicine   [facility] - Telemetry Stepdown

## 2023-07-16 NOTE — ASSESSMENT & PLAN NOTE
On admission patient has Mg 1.2 likely due to chronic malnutrition with possible cirrhosis in the setting of alcohol use disorder.    -- 2g IV Mg given  -- Trend Mg and replete with goal Mg > 2

## 2023-07-17 LAB
ALBUMIN FLD-MCNC: 0.5 G/DL
ALBUMIN SERPL BCP-MCNC: 2.3 G/DL (ref 3.5–5.2)
ALP SERPL-CCNC: 164 U/L (ref 55–135)
ALT SERPL W/O P-5'-P-CCNC: 29 U/L (ref 10–44)
ANA SER QL IF: NORMAL
ANION GAP SERPL CALC-SCNC: 10 MMOL/L (ref 8–16)
APPEARANCE FLD: CLEAR
AST SERPL-CCNC: 87 U/L (ref 10–40)
BASOPHILS # BLD AUTO: 0.12 K/UL (ref 0–0.2)
BASOPHILS NFR BLD: 0.7 % (ref 0–1.9)
BILIRUB SERPL-MCNC: 1 MG/DL (ref 0.1–1)
BODY FLD TYPE: NORMAL
BODY FLUID SOURCE, LDH: NORMAL
BUN SERPL-MCNC: 7 MG/DL (ref 6–20)
CALCIUM SERPL-MCNC: 8.4 MG/DL (ref 8.7–10.5)
CHLORIDE SERPL-SCNC: 102 MMOL/L (ref 95–110)
CLINICAL BIOCHEMIST REVIEW: NORMAL
CO2 SERPL-SCNC: 22 MMOL/L (ref 23–29)
COLOR FLD: YELLOW
CREAT SERPL-MCNC: 0.6 MG/DL (ref 0.5–1.4)
DIFFERENTIAL METHOD: ABNORMAL
EOSINOPHIL # BLD AUTO: 0.8 K/UL (ref 0–0.5)
EOSINOPHIL NFR BLD: 5 % (ref 0–8)
ERYTHROCYTE [DISTWIDTH] IN BLOOD BY AUTOMATED COUNT: 17.8 % (ref 11.5–14.5)
EST. GFR  (NO RACE VARIABLE): >60 ML/MIN/1.73 M^2
GLUCOSE SERPL-MCNC: 106 MG/DL (ref 70–110)
GRAM STN SPEC: NORMAL
GRAM STN SPEC: NORMAL
HCT VFR BLD AUTO: 28.3 % (ref 37–48.5)
HGB BLD-MCNC: 9.4 G/DL (ref 12–16)
IMM GRANULOCYTES # BLD AUTO: 0.09 K/UL (ref 0–0.04)
IMM GRANULOCYTES NFR BLD AUTO: 0.5 % (ref 0–0.5)
INR PPP: 1.3 (ref 0.8–1.2)
LDH FLD L TO P-CCNC: 40 U/L
LYMPHOCYTES # BLD AUTO: 2.1 K/UL (ref 1–4.8)
LYMPHOCYTES NFR BLD: 12.6 % (ref 18–48)
LYMPHOCYTES NFR FLD MANUAL: 10 %
MAGNESIUM SERPL-MCNC: 1.3 MG/DL (ref 1.6–2.6)
MCH RBC QN AUTO: 36.4 PG (ref 27–31)
MCHC RBC AUTO-ENTMCNC: 33.2 G/DL (ref 32–36)
MCV RBC AUTO: 110 FL (ref 82–98)
MESOTHL CELL NFR FLD MANUAL: 17 %
MITOCHONDRIA AB TITR SER IF: NORMAL {TITER}
MONOCYTES # BLD AUTO: 1.3 K/UL (ref 0.3–1)
MONOCYTES NFR BLD: 7.9 % (ref 4–15)
MONOS+MACROS NFR FLD MANUAL: 29 %
NEUTROPHILS # BLD AUTO: 12.1 K/UL (ref 1.8–7.7)
NEUTROPHILS NFR BLD: 73.3 % (ref 38–73)
NEUTROPHILS NFR FLD MANUAL: 44 %
NRBC BLD-RTO: 0 /100 WBC
PHOSPHATE SERPL-MCNC: 2.9 MG/DL (ref 2.7–4.5)
PLATELET # BLD AUTO: 274 K/UL (ref 150–450)
PLPETH BLD-MCNC: 69 NG/ML
PMV BLD AUTO: 10.3 FL (ref 9.2–12.9)
POPETH BLD-MCNC: 93 NG/ML
POTASSIUM SERPL-SCNC: 3.5 MMOL/L (ref 3.5–5.1)
PROT FLD-MCNC: <1 G/DL
PROT SERPL-MCNC: 5.8 G/DL (ref 6–8.4)
PROTHROMBIN TIME: 13.6 SEC (ref 9–12.5)
RBC # BLD AUTO: 2.58 M/UL (ref 4–5.4)
SMOOTH MUSCLE AB TITR SER IF: NORMAL {TITER}
SODIUM SERPL-SCNC: 134 MMOL/L (ref 136–145)
SPECIMEN SOURCE: NORMAL
SPECIMEN SOURCE: NORMAL
WBC # BLD AUTO: 16.55 K/UL (ref 3.9–12.7)
WBC # FLD: 132 /CU MM

## 2023-07-17 PROCEDURE — 99223 PR INITIAL HOSPITAL CARE,LEVL III: ICD-10-PCS | Mod: ,,, | Performed by: STUDENT IN AN ORGANIZED HEALTH CARE EDUCATION/TRAINING PROGRAM

## 2023-07-17 PROCEDURE — 25000003 PHARM REV CODE 250

## 2023-07-17 PROCEDURE — 80053 COMPREHEN METABOLIC PANEL: CPT | Performed by: HOSPITALIST

## 2023-07-17 PROCEDURE — 85610 PROTHROMBIN TIME: CPT

## 2023-07-17 PROCEDURE — 99233 SBSQ HOSP IP/OBS HIGH 50: CPT | Mod: 25,GC,, | Performed by: HOSPITALIST

## 2023-07-17 PROCEDURE — 20600001 HC STEP DOWN PRIVATE ROOM

## 2023-07-17 PROCEDURE — 85025 COMPLETE CBC W/AUTO DIFF WBC: CPT

## 2023-07-17 PROCEDURE — 36415 COLL VENOUS BLD VENIPUNCTURE: CPT

## 2023-07-17 PROCEDURE — 83615 LACTATE (LD) (LDH) ENZYME: CPT

## 2023-07-17 PROCEDURE — 63600175 PHARM REV CODE 636 W HCPCS

## 2023-07-17 PROCEDURE — 82042 OTHER SOURCE ALBUMIN QUAN EA: CPT

## 2023-07-17 PROCEDURE — 25000003 PHARM REV CODE 250: Performed by: FAMILY MEDICINE

## 2023-07-17 PROCEDURE — 87070 CULTURE OTHR SPECIMN AEROBIC: CPT

## 2023-07-17 PROCEDURE — 84100 ASSAY OF PHOSPHORUS: CPT | Performed by: HOSPITALIST

## 2023-07-17 PROCEDURE — 83735 ASSAY OF MAGNESIUM: CPT | Performed by: HOSPITALIST

## 2023-07-17 PROCEDURE — 89051 BODY FLUID CELL COUNT: CPT

## 2023-07-17 PROCEDURE — 84157 ASSAY OF PROTEIN OTHER: CPT

## 2023-07-17 PROCEDURE — 99233 PR SUBSEQUENT HOSPITAL CARE,LEVL III: ICD-10-PCS | Mod: 25,GC,, | Performed by: HOSPITALIST

## 2023-07-17 PROCEDURE — 87075 CULTR BACTERIA EXCEPT BLOOD: CPT

## 2023-07-17 PROCEDURE — 63600175 PHARM REV CODE 636 W HCPCS: Performed by: HOSPITALIST

## 2023-07-17 PROCEDURE — 99223 1ST HOSP IP/OBS HIGH 75: CPT | Mod: ,,, | Performed by: STUDENT IN AN ORGANIZED HEALTH CARE EDUCATION/TRAINING PROGRAM

## 2023-07-17 PROCEDURE — 36415 COLL VENOUS BLD VENIPUNCTURE: CPT | Performed by: HOSPITALIST

## 2023-07-17 PROCEDURE — 87205 SMEAR GRAM STAIN: CPT

## 2023-07-17 RX ORDER — MAGNESIUM SULFATE HEPTAHYDRATE 40 MG/ML
2 INJECTION, SOLUTION INTRAVENOUS
Status: COMPLETED | OUTPATIENT
Start: 2023-07-18 | End: 2023-07-18

## 2023-07-17 RX ORDER — LIDOCAINE HYDROCHLORIDE 10 MG/ML
INJECTION INFILTRATION; PERINEURAL
Status: COMPLETED | OUTPATIENT
Start: 2023-07-17 | End: 2023-07-17

## 2023-07-17 RX ORDER — SULFAMETHOXAZOLE AND TRIMETHOPRIM 800; 160 MG/1; MG/1
1 TABLET ORAL 2 TIMES DAILY
Qty: 2 TABLET | Refills: 0 | Status: CANCELLED | OUTPATIENT
Start: 2023-07-18 | End: 2023-07-19

## 2023-07-17 RX ORDER — POTASSIUM CHLORIDE 750 MG/1
10 CAPSULE, EXTENDED RELEASE ORAL DAILY
Qty: 30 CAPSULE | Refills: 0 | Status: SHIPPED | OUTPATIENT
Start: 2023-07-18 | End: 2023-07-18 | Stop reason: SDUPTHER

## 2023-07-17 RX ORDER — SPIRONOLACTONE 50 MG/1
50 TABLET, FILM COATED ORAL DAILY
Qty: 30 TABLET | Refills: 11 | Status: SHIPPED | OUTPATIENT
Start: 2023-07-18 | End: 2023-07-18 | Stop reason: SDUPTHER

## 2023-07-17 RX ORDER — FUROSEMIDE 20 MG/1
20 TABLET ORAL DAILY
Qty: 30 TABLET | Refills: 11 | Status: SHIPPED | OUTPATIENT
Start: 2023-07-18 | End: 2023-07-18 | Stop reason: SDUPTHER

## 2023-07-17 RX ORDER — FOLIC ACID 1 MG/1
1 TABLET ORAL DAILY
Qty: 30 TABLET | Refills: 2 | Status: SHIPPED | OUTPATIENT
Start: 2023-07-18 | End: 2023-07-18 | Stop reason: SDUPTHER

## 2023-07-17 RX ORDER — FUROSEMIDE 20 MG/1
20 TABLET ORAL DAILY
Status: DISCONTINUED | OUTPATIENT
Start: 2023-07-18 | End: 2023-07-18 | Stop reason: HOSPADM

## 2023-07-17 RX ORDER — CEFPODOXIME PROXETIL 100 MG/1
100 TABLET, FILM COATED ORAL EVERY 12 HOURS
Qty: 4 TABLET | Refills: 0 | Status: SHIPPED | OUTPATIENT
Start: 2023-07-18 | End: 2023-07-18 | Stop reason: SDUPTHER

## 2023-07-17 RX ORDER — SPIRONOLACTONE 25 MG/1
50 TABLET ORAL DAILY
Status: DISCONTINUED | OUTPATIENT
Start: 2023-07-18 | End: 2023-07-18 | Stop reason: HOSPADM

## 2023-07-17 RX ORDER — LANOLIN ALCOHOL/MO/W.PET/CERES
400 CREAM (GRAM) TOPICAL ONCE
Status: COMPLETED | OUTPATIENT
Start: 2023-07-17 | End: 2023-07-17

## 2023-07-17 RX ORDER — HYDROXYZINE HYDROCHLORIDE 25 MG/1
25 TABLET, FILM COATED ORAL 3 TIMES DAILY PRN
Status: DISCONTINUED | OUTPATIENT
Start: 2023-07-17 | End: 2023-07-18 | Stop reason: HOSPADM

## 2023-07-17 RX ORDER — ENOXAPARIN SODIUM 100 MG/ML
40 INJECTION SUBCUTANEOUS EVERY 24 HOURS
Status: DISCONTINUED | OUTPATIENT
Start: 2023-07-17 | End: 2023-07-18 | Stop reason: HOSPADM

## 2023-07-17 RX ADMIN — LIDOCAINE HYDROCHLORIDE 5 ML: 10 INJECTION, SOLUTION INFILTRATION; PERINEURAL at 10:07

## 2023-07-17 RX ADMIN — SENNOSIDES AND DOCUSATE SODIUM 1 TABLET: 50; 8.6 TABLET ORAL at 08:07

## 2023-07-17 RX ADMIN — THERA TABS 1 TABLET: TAB at 08:07

## 2023-07-17 RX ADMIN — ENOXAPARIN SODIUM 40 MG: 40 INJECTION SUBCUTANEOUS at 04:07

## 2023-07-17 RX ADMIN — HYDROXYZINE HYDROCHLORIDE 25 MG: 25 TABLET, FILM COATED ORAL at 04:07

## 2023-07-17 RX ADMIN — Medication 400 MG: at 05:07

## 2023-07-17 RX ADMIN — Medication 6 MG: at 08:07

## 2023-07-17 RX ADMIN — CEFTRIAXONE 1 G: 1 INJECTION, POWDER, FOR SOLUTION INTRAMUSCULAR; INTRAVENOUS at 08:07

## 2023-07-17 RX ADMIN — CLONIDINE HYDROCHLORIDE 0.1 MG: 0.1 TABLET ORAL at 08:07

## 2023-07-17 RX ADMIN — THIAMINE HCL TAB 100 MG 100 MG: 100 TAB at 08:07

## 2023-07-17 RX ADMIN — FUROSEMIDE 20 MG: 10 INJECTION, SOLUTION INTRAMUSCULAR; INTRAVENOUS at 08:07

## 2023-07-17 RX ADMIN — POTASSIUM CHLORIDE 10 MEQ: 10 CAPSULE, COATED, EXTENDED RELEASE ORAL at 08:07

## 2023-07-17 RX ADMIN — FOLIC ACID 1 MG: 1 TABLET ORAL at 08:07

## 2023-07-17 RX ADMIN — SPIRONOLACTONE 50 MG: 25 TABLET, FILM COATED ORAL at 08:07

## 2023-07-17 NOTE — ASSESSMENT & PLAN NOTE
On admission patient has Mg 1.2 likely due to chronic malnutrition with possible cirrhosis in the setting of alcohol use disorder.    -- Pending CMP  -- Trend Mg and replete with goal Mg > 2

## 2023-07-17 NOTE — ASSESSMENT & PLAN NOTE
On admission, WBC 19.78 likely 2/2 to UTI or SBP. UA and paracentesis ordered to assess source of infection.    --Trend CBC. Down trending at 16.55  --One dose rocephin given in ED and receiving third dose today.   --Urine cultures positive for gram negative rebel sensitive to CTX

## 2023-07-17 NOTE — NURSING
POC reviewed with patient. VSS. AOX4, .  Patient on room air, tolerating well. No acute changes during shift. Bed alarm set and call light within reach.

## 2023-07-17 NOTE — HPI
Pt is a 57 year old female with a PMHx of alcohol use disorder, ADHD, and DJD was consulted for cirrhosis and concern for HCC on CT abd. She presented on 7/14 for increasing abdominal distension for the past 2 years but has significantly worsened past 2-3 months affecting her ADLs. She has a long Hx of alcohol abuse worsened by difficulty in her marriage and reports drinking at least 5-8 drinks per day but reported stopping in the past 6 months.     Prior to hospitalization she was seen by GI clinic and worked up for possible cholecystectomy due to gallstones found on U/S. She was seen by general surgery but they did not feel comfortable with procedure due to liver function. She was scheduled to have virtual hepatology on 7/19.     During hospital stay, she had IR paracentesis (7/15) draining 3.5L yellow/clear fluid with WBC<77. SAAG 1.9 which is more consistent to portal HTN due to decompensated cirrhosis. She reports relief with drainage however reports most of the distension has returned. There is a discussion with primary team for possible another paracentesis. Currently she is on IV lasix 20mg, Spironolactone 50mg, and CTX.     CT abd was concerning for 15.6mm hypo attenuated mass in the R hepatic lobe.  AFP 5.3 (not elevated)     Albumin 2.4   MELD 16 > 11

## 2023-07-17 NOTE — PLAN OF CARE
[facility] - Telemetry Stepdown      HOME HEALTH ORDERS  FACE TO FACE ENCOUNTER    Patient Name: Claire Casper  YOB: 1965    PCP: Gogo Ramirez MD   PCP Address: [address] [facility] / [place] [address]  PCP Phone Number: [phone]  PCP Fax: [phone]    Encounter Date: 7/14/23    Admit to Home Health    Diagnoses:  Active Hospital Problems    Diagnosis  POA    *Cirrhosis of liver with ascites [K74.60, R18.8]  Unknown    Ascites [R18.8]  Unknown    Cholelithiasis without cholecystitis [K80.20]  Unknown    Hyponatremia [E87.1]  Unknown    UTI (urinary tract infection) [N39.0]  Unknown    Leukocytosis [D72.829]  Unknown    Hypomagnesemia [E83.42]  Yes    Transaminitis [R74.01]  Yes    Elevated TSH [R79.89]  Yes      Resolved Hospital Problems   No resolved problems to display.       Follow Up Appointments:  Future Appointments   Date Time Provider Department Center   7/19/2023  8:00 AM Sanjeev Esquivel MD [facility] HEPAT [facility]   8/23/2023  2:00 PM Gogo Ramirez MD [facility]       Allergies:Review of patient's allergies indicates:  No Known Allergies    Medications: Review discharge medications with patient and family and provide education.    Current Facility-Administered Medications   Medication Dose Route Frequency Provider Last Rate Last Admin    acetaminophen tablet 325 mg  325 mg Oral Q6H PRN Vick Walters MD        cefTRIAXone (ROCEPHIN) 1 g in dextrose 5 % in water (D5W) 5 % 100 mL IVPB (MB+)  1 g Intravenous Q24H Vick Walters MD   Stopped at 07/17/23 0840    cloNIDine tablet 0.1 mg  0.1 mg Oral Daily PRN Vick Walters MD   0.1 mg at 07/16/23 2146    dextrose 10% bolus 125 mL 125 mL  12.5 g Intravenous PRN Vick Walters MD        dextrose 10% bolus 250 mL 250 mL  25 g Intravenous PRN Vick Walters MD        enoxaparin injection 40 mg  40 mg Subcutaneous Q24H (prophylaxis, 1700) Vick Walters MD        folic acid tablet 1 mg   1 mg Oral Daily Vick Walters MD   1 mg at 07/17/23 0810    [START ON 7/18/2023] furosemide tablet 20 mg  20 mg Oral Daily Vick Walters MD        glucagon (human recombinant) injection 1 mg  1 mg Intramuscular PRN Vick Walters MD        glucose chewable tablet 16 g  16 g Oral PRN Vick Walters MD        glucose chewable tablet 24 g  24 g Oral PRN Vick Walters MD        hydrOXYzine HCL tablet 25 mg  25 mg Oral TID PRN Vcik Walters MD        melatonin tablet 6 mg  6 mg Oral Nightly PRN Vick Walters MD   6 mg at 07/16/23 2146    multivitamin tablet  1 tablet Oral Daily Zi-On MD Jean-Claude   1 tablet at 07/17/23 0811    naloxone 0.4 mg/mL injection 0.02 mg  0.02 mg Intravenous PRN Vick Walters MD        potassium chloride CR capsule 10 mEq  10 mEq Oral Daily Vick Walters MD   10 mEq at 07/17/23 0811    senna-docusate 8.6-50 mg per tablet 1 tablet  1 tablet Oral BID Vick Walters MD   1 tablet at 07/17/23 0809    sodium chloride 0.9% flush 10 mL  10 mL Intravenous Q12H PRN Vick Walters MD        sodium chloride 0.9% flush 10 mL  10 mL Intravenous PRN Vick Walters MD        [START ON 7/18/2023] spironolactone tablet 50 mg  50 mg Oral Daily Vick Waltres MD        thiamine tablet 100 mg  100 mg Oral Daily Vick Walters MD   100 mg at 07/17/23 0810     Current Discharge Medication List        START taking these medications    Details   cefpodoxime (VANTIN) 100 MG tablet Take 1 tablet (100 mg total) by mouth every 12 (twelve) hours. for 2 days  Qty: 4 tablet, Refills: 0      folic acid (FOLVITE) 1 MG tablet Take 1 tablet (1 mg total) by mouth once daily.  Qty: 30 tablet, Refills: 2      furosemide (LASIX) 20 MG tablet Take 1 tablet (20 mg total) by mouth once daily.  Qty: 30 tablet, Refills: 11      potassium chloride (MICRO-K) 10 MEQ CpSR Take 1 capsule (10 mEq total) by mouth once daily.  Qty: 30 capsule, Refills: 0      spironolactone (ALDACTONE) 50 MG tablet  Take 1 tablet (50 mg total) by mouth once daily.  Qty: 30 tablet, Refills: 11    Comments: .           CONTINUE these medications which have NOT CHANGED    Details   bisacodyL (DULCOLAX) 5 mg EC tablet Take 5 mg by mouth daily as needed for Constipation.      cloNIDine (CATAPRES) 0.1 MG tablet Take 1 tab by mouth each evening as needed for sleep  Qty: 90 tablet, Refills: 3    Comments: .  Associated Diagnoses: Insomnia, unspecified type      dextroamphetamine-amphetamine 30 mg Tab Take 1 tablet (30 mg total) by mouth Daily.  Qty: 30 tablet, Refills: 0    Associated Diagnoses: Attention deficit hyperactivity disorder (ADHD), unspecified ADHD type      ibuprofen (ADVIL,MOTRIN) 200 MG tablet Take 200-400 mg by mouth every 8 (eight) hours as needed for Pain.      loratadine (CLARITIN) 10 mg tablet Take 10 mg by mouth daily as needed for Allergies.      dicyclomine (BENTYL) 20 mg tablet Take 1 tablet (20 mg total) by mouth 4 (four) times daily as needed (abdominal).  Qty: 120 tablet, Refills: 0    Associated Diagnoses: Pain of upper abdomen           STOP taking these medications       multivitamin (THERAGRAN) per tablet Comments:   Reason for Stopping:         thiamine 100 MG tablet Comments:   Reason for Stopping:                 I have seen and examined this patient within the last 30 days. My clinical findings that support the need for the home health skilled services and home bound status are the following:no   Weakness/numbness causing balance and gait disturbance due to Liver Disease making it taxing to leave home.     Diet:   regular diet      Referrals/ Consults  Physical Therapy to evaluate and treat. Evaluate for home safety and equipment needs; Establish/upgrade home exercise program. Perform / instruct on therapeutic exercises, gait training, transfer training, and Range of Motion.  Occupational Therapy to evaluate and treat. Evaluate home environment for safety and equipment needs. Perform/Instruct on  transfers, ADL training, ROM, and therapeutic exercises.    Activities:   activity as tolerated    Nursing:   Agency to admit patient within 24 hours of hospital discharge unless specified on physician order or at patient request    SN to complete comprehensive assessment including routine vital signs. Instruct on disease process and s/s of complications to report to MD. Review/verify medication list sent home with the patient at time of discharge  and instruct patient/caregiver as needed. Frequency may be adjusted depending on start of care date.     Skilled nurse to perform up to 3 visits PRN for symptoms related to diagnosis    Notify MD if SBP > 160 or < 90; DBP > 90 or < 50; HR > 120 or < 50; Temp > 101; O2 < 88%; Other:       Ok to schedule additional visits based on staff availability and patient request on consecutive days within the home health episode.    When multiple disciplines ordered:    Start of Care occurs on Sunday - Wednesday schedule remaining discipline evaluations as ordered on separate consecutive days following the start of care.    Thursday SOC -schedule subsequent evaluations Friday and Monday the following week.     Friday - Saturday SOC - schedule subsequent discipline evaluations on consecutive days starting Monday of the following week.    For all post-discharge communication and subsequent orders please contact patient's primary care physician. If unable to reach primary care physician or do not receive response within 30 minutes, please contact Stacey Ness MD for clinical staff order clarification      Home Health Aide:  Physical Therapy Three times weekly and Occupational Therapy Three times weekly    Wound Care Orders  no    I certify that this patient is confined to her home and needs physical therapy and occupational therapy.

## 2023-07-17 NOTE — SUBJECTIVE & OBJECTIVE
Interval History: No events overnight.    Review of Systems   Constitutional:  Positive for activity change and appetite change. Negative for chills, diaphoresis, fatigue and fever.   HENT:  Negative for congestion, facial swelling, nosebleeds, postnasal drip, rhinorrhea, sinus pressure, sinus pain, sneezing, sore throat and trouble swallowing.    Eyes:  Negative for pain and visual disturbance.   Respiratory:  Negative for cough, choking, chest tightness, shortness of breath and wheezing.    Cardiovascular:  Negative for chest pain, palpitations and leg swelling.   Gastrointestinal:  Positive for abdominal distention and abdominal pain. Negative for anal bleeding, blood in stool, constipation, diarrhea, nausea, rectal pain and vomiting.   Genitourinary:  Negative for difficulty urinating, flank pain, frequency and urgency.   Musculoskeletal:  Negative for arthralgias and back pain.   Skin:  Negative for color change and wound.   Neurological:  Negative for dizziness, seizures, syncope, weakness, light-headedness and headaches.   Hematological:  Does not bruise/bleed easily.   Psychiatric/Behavioral:  Negative for confusion.    Objective:     Vital Signs (Most Recent):  Temp: 97.4 °F (36.3 °C) (07/17/23 0743)  Pulse: 109 (07/17/23 1112)  Resp: 19 (07/17/23 1112)  BP: 108/75 (07/17/23 1112)  SpO2: 95 % (07/17/23 1112) Vital Signs (24h Range):  Temp:  [97.4 °F (36.3 °C)-98.3 °F (36.8 °C)] 97.4 °F (36.3 °C)  Pulse:  [108-122] 109  Resp:  [16-19] 19  SpO2:  [95 %-97 %] 95 %  BP: ()/(61-84) 108/75     Weight: 55 kg (121 lb 4.1 oz)  Body mass index is 20.81 kg/m².    Intake/Output Summary (Last 24 hours) at 7/17/2023 1524  Last data filed at 7/17/2023 1114  Gross per 24 hour   Intake --   Output 3450 ml   Net -3450 ml         Physical Exam  Vitals and nursing note reviewed.   Constitutional:       Appearance: She is normal weight. She is ill-appearing.      Comments: Muscle wasting on limbs   HENT:      Head:  Normocephalic and atraumatic.      Right Ear: External ear normal.      Left Ear: External ear normal.      Nose: Nose normal. No congestion or rhinorrhea.      Mouth/Throat:      Mouth: Mucous membranes are moist.      Pharynx: Oropharynx is clear. No oropharyngeal exudate.   Eyes:      General: Scleral icterus present.         Right eye: No discharge.         Left eye: No discharge.      Extraocular Movements: Extraocular movements intact.      Pupils: Pupils are equal, round, and reactive to light.   Cardiovascular:      Rate and Rhythm: Tachycardia present.      Pulses: Normal pulses.      Heart sounds: Normal heart sounds.   Pulmonary:      Effort: Pulmonary effort is normal.      Breath sounds: No rales.   Abdominal:      General: There is distension (improved after paracentesis).      Tenderness: There is abdominal tenderness. There is no right CVA tenderness or left CVA tenderness.   Musculoskeletal:      Cervical back: Normal range of motion.      Right lower leg: No edema.      Left lower leg: No edema.   Skin:     Coloration: Skin is pale.      Comments: Diffuse spider aniomas    Neurological:      General: No focal deficit present.      Mental Status: She is alert and oriented to person, place, and time.      Comments: No asterixis   Psychiatric:         Mood and Affect: Mood normal.           Significant Labs: All pertinent labs within the past 24 hours have been reviewed.  CBC:   Recent Labs   Lab 07/16/23  0453 07/17/23  0613   WBC 17.75* 16.55*   HGB 9.6* 9.4*   HCT 28.9* 28.3*    274     CMP:   Recent Labs   Lab 07/16/23  0453   *   K 3.3*      CO2 20*      BUN 6   CREATININE 0.6   CALCIUM 8.8   PROT 5.8*   ALBUMIN 2.4*   BILITOT 1.5*   ALKPHOS 165*   AST 76*   ALT 28   ANIONGAP 10       Significant Imaging: I have reviewed all pertinent imaging results/findings within the past 24 hours.

## 2023-07-17 NOTE — NURSING
End of shift note    AAOx4  RA  Paracentesis done  Up with assist  Pure wick- 500cc  VSS  NADN- safety checks performed  Call light in reach

## 2023-07-17 NOTE — PLAN OF CARE
Pt arrived to M[facility] room 2 for para, no acute distress noted. Orders and labs reviewed on chart. Awaiting consent.

## 2023-07-17 NOTE — PROCEDURES
Radiology Post-Procedure Note    Pre Op Diagnosis: Ascites  Post Op Diagnosis: Same    Procedure: Ultrasound Guided Paracentesis    Procedure performed by: Ashley Lynn NP     Written Informed Consent Obtained: Yes  Specimen Removed: YES serous  Estimated Blood Loss: Minimal    Findings:   Successful paracentesis.  Albumin administered PRN per protocol.    Patient tolerated procedure well.    Ashley Lynn, APRN, FNP  Interventional Radiology  [phone] clinic

## 2023-07-17 NOTE — PLAN OF CARE
Procedure completed, pt tolerated well. No apparent distress noted. 2.8 Liters removed from LLQ, mepore applied CDI. Labs collected and sent. No albumin given per protocol.  Pt transferred back to room via transport. Report called to SHARMILA Bowens.

## 2023-07-17 NOTE — PROGRESS NOTES
"[facility] Medicine  Progress Note    Patient Name: Claire Casper  MRN: [id number]  Patient Class: IP- Inpatient   Admission Date: 7/14/2023  Length of Stay: 1 days  Attending Physician: Stacey Ness MD  Primary Care Provider: Gogo Ramirez MD        Subjective:     Principal Problem:Cirrhosis of liver with ascites        HPI:  This is a 57 year old woman with PMH ADHD on Adderall daily, insomnia on clonidine, DJD (degenerative joint disease) of cervical spine, previous breast augmentation, chronic constipation and alcoholism. She presents to the ED with worsening abdominal swelling worsening over last 2 months, with acutely worsened dyspnea and midsternal chest pain in the last few days. She visited urgent care for the symptoms and was referred to GI and for imaging. She received CTAP w/wo contrast on 6/23/23 showing "Hepatomegaly. Liver nodularity raising question of cirrhosis. Mild ascites. Cholelithiasis without cholecystitis. Diverticulosis without diverticulitis. She saw GI and was referred to hepatology where she has a scheduled appointment next week. She presented to ED before this appointment because her abdominal distension is becoming increasingly painful, she does not want to eat because she has a feeling of fullness, and she is constipated with last bowel movement 10 days ago. She reports decades of social drinking, 5-8 drinks, that increased over the last decade. Since the distension and discomfort started 2 months ago she denies having any alcoholic drinks. She endorses shortness of breath on exertion and is using a walker, constipation, and abdominal pain. She denies fever, chills, sweating, nausea, vomiting, chest pain, palpitations, cough, dysuria, excessive bleeding or brusing.    On ED admission she received an abdominal U/S showing ascites, cholelithiasis without secondary signs of cholecystitis with CBD ranging from 5-11 mm with no choledocholithiasis " "or other explanation for extrahepatic biliary duct dilation. CTAP with contrast ordered and results pending.CXR showed: "Heart size normal.  Ground-glass opacification of the lower lung fields consistent with underlying small amount of pleural effusion and atelectatic changes.  The lung apices are clear". UA results: orange hazy appearnace, +1 protein, ketones +1, Bilirubin +1, leukocyte +2, RBC 13, WBC 23, bacteria present, cultures sent. Labs notable for WBC 19.78, H/H 10.3/31.7, MCV 11, PT 13.9, INR 1.3, Na 131, CO2 20, Mg 1.2, Alk Jt 187, Tbili 2.2, Direct bili 0.8, AST 96, ALT 32 (wnl), TSH 7.443. Labs negative for elevated acetaminophen levels. EKG showed low voltage QRS in limb leads. IR paracentesis planned with fluid analysis to rule out SBP. Patient started on ceftriaxone in ED, spironolactone and lasix ordered on admission to floor.        Overview/Hospital Course:  Following admission for abdominal distension and leukocytosis of 19.78 thought to be from a UTI or SBP. CTAP results showed symptoms were likely due to hepatic cirrhosis with additional findings of portal hypertension with moderate abdominopelvic ascites and upper abdominal varices including paraesophageal varices and an ll-defined hypodensity in the right hepatic lobe highly concerning for hepatocellular carcinoma. Cirrhosis panel was ordered and patient was started on lasix/spironolactone. Patient had 3.5 L of fluid drained with paracentesis on 7/15 and 2.8 L of fluid on 7/17 with fluid analysis is negative for SBP, but SAAG indicates portal hypertension. Hepatology was consulted and recommended outpatient follow up along with MRI w wo contrast of abdomen. Patient to be discharged on 20 mg lasix and 50 mg spironolactone daily. Continue to treat UTI based on sensitivities.          Interval History: No events overnight.    Review of Systems   Constitutional:  Positive for activity change and appetite change. Negative for chills, diaphoresis, " fatigue and fever.   HENT:  Negative for congestion, facial swelling, nosebleeds, postnasal drip, rhinorrhea, sinus pressure, sinus pain, sneezing, sore throat and trouble swallowing.    Eyes:  Negative for pain and visual disturbance.   Respiratory:  Negative for cough, choking, chest tightness, shortness of breath and wheezing.    Cardiovascular:  Negative for chest pain, palpitations and leg swelling.   Gastrointestinal:  Positive for abdominal distention and abdominal pain. Negative for anal bleeding, blood in stool, constipation, diarrhea, nausea, rectal pain and vomiting.   Genitourinary:  Negative for difficulty urinating, flank pain, frequency and urgency.   Musculoskeletal:  Negative for arthralgias and back pain.   Skin:  Negative for color change and wound.   Neurological:  Negative for dizziness, seizures, syncope, weakness, light-headedness and headaches.   Hematological:  Does not bruise/bleed easily.   Psychiatric/Behavioral:  Negative for confusion.    Objective:     Vital Signs (Most Recent):  Temp: 97.4 °F (36.3 °C) (07/17/23 0743)  Pulse: 109 (07/17/23 1112)  Resp: 19 (07/17/23 1112)  BP: 108/75 (07/17/23 1112)  SpO2: 95 % (07/17/23 1112) Vital Signs (24h Range):  Temp:  [97.4 °F (36.3 °C)-98.3 °F (36.8 °C)] 97.4 °F (36.3 °C)  Pulse:  [108-122] 109  Resp:  [16-19] 19  SpO2:  [95 %-97 %] 95 %  BP: ()/(61-84) 108/75     Weight: 55 kg (121 lb 4.1 oz)  Body mass index is 20.81 kg/m².    Intake/Output Summary (Last 24 hours) at 7/17/2023 1524  Last data filed at 7/17/2023 1114  Gross per 24 hour   Intake --   Output 3450 ml   Net -3450 ml         Physical Exam  Vitals and nursing note reviewed.   Constitutional:       Appearance: She is normal weight. She is ill-appearing.      Comments: Muscle wasting on limbs   HENT:      Head: Normocephalic and atraumatic.      Right Ear: External ear normal.      Left Ear: External ear normal.      Nose: Nose normal. No congestion or rhinorrhea.       Mouth/Throat:      Mouth: Mucous membranes are moist.      Pharynx: Oropharynx is clear. No oropharyngeal exudate.   Eyes:      General: Scleral icterus present.         Right eye: No discharge.         Left eye: No discharge.      Extraocular Movements: Extraocular movements intact.      Pupils: Pupils are equal, round, and reactive to light.   Cardiovascular:      Rate and Rhythm: Tachycardia present.      Pulses: Normal pulses.      Heart sounds: Normal heart sounds.   Pulmonary:      Effort: Pulmonary effort is normal.      Breath sounds: No rales.   Abdominal:      General: There is distension (improved after paracentesis).      Tenderness: There is abdominal tenderness. There is no right CVA tenderness or left CVA tenderness.   Musculoskeletal:      Cervical back: Normal range of motion.      Right lower leg: No edema.      Left lower leg: No edema.   Skin:     Coloration: Skin is pale.      Comments: Diffuse spider aniomas    Neurological:      General: No focal deficit present.      Mental Status: She is alert and oriented to person, place, and time.      Comments: No asterixis   Psychiatric:         Mood and Affect: Mood normal.           Significant Labs: All pertinent labs within the past 24 hours have been reviewed.  CBC:   Recent Labs   Lab 07/16/23  0453 07/17/23  0613   WBC 17.75* 16.55*   HGB 9.6* 9.4*   HCT 28.9* 28.3*    274     CMP:   Recent Labs   Lab 07/16/23  0453   *   K 3.3*      CO2 20*      BUN 6   CREATININE 0.6   CALCIUM 8.8   PROT 5.8*   ALBUMIN 2.4*   BILITOT 1.5*   ALKPHOS 165*   AST 76*   ALT 28   ANIONGAP 10       Significant Imaging: I have reviewed all pertinent imaging results/findings within the past 24 hours.      Assessment/Plan:      * Cirrhosis of liver with ascites  Patient presenting with ascites and abdominal distension. Likely secondary to cirrhosis secondary to ethanol use given history.   Cirrhosis history: First imaging on CTAP  23  Alcohol history: 30+ years of social drinking, mostly mixed drinks that became heavier over the last decade. Patient stopped drinking 2 months ago.  Tylenol / Medication history: Labs negative for elevated acetaminophen.   CTAP Imagin. Morphologic changes of hepatic cirrhosis and findings of portal hypertension including moderate abdominopelvic ascites and upper abdominal varices including paraesophageal varices.   2. Ill-defined hypodensity in the right hepatic lobe highly concerning for hepatocellular carcinoma.  Further evaluation with nonemergent MRI of the abdomen with and without contrast is recommended.   3. Small left pleural effusion.   4. Cholelithiasis without acute cholecystitis.   5. Mild body wall anasarca.     MELD 3.0: 15 at 2023  6:13 AM  MELD-Na: 11 at 2023  6:13 AM  Calculated from:  Serum Creatinine: 0.6 mg/dL (Using min of 1 mg/dL) at 2023  4:53 AM  Serum Sodium: 135 mmol/L at 2023  4:53 AM  Total Bilirubin: 1.5 mg/dL at 2023  4:53 AM  Serum Albumin: 2.4 g/dL at 2023  4:53 AM  INR(ratio): 1.3 at 2023  6:13 AM  Age at listing (hypothetical): 57 years  Sex: Female at 2023  6:13 AM     -- Lasix 20 BID, Spironolactone 50 daily  -- Viral serolgy negative, ferritin elevated but not extremely high, ceruloplasmin normal, alpha 1 antitrypsin high  -- Paracentesis fluid analysis: Ascites wbc 77, glucose 124, albumin 0.6. SAAG 1.9. Portal hypertension without SBP  -- Trend CMP LFTs / INR daily  -- Daily weight and strict I/Os  -- 2nd Paracentesis with IR on  drained 2.8 L from LLQ    Hepatology Consult:  - Would recommend obtaining MRI with/without prior to discharge  - Has outpatient follow up with Dr. Esquivel scheduled for   - Would transition to Lasix 20 mg QD and Aldactone 50 mg QD; when able to discharge, would send on this regimen  - Agree with serologic workup initiated by primary team, follow up serologic workup- pending SHARI TAYLOR  "and RAMÓN            Leukocytosis  On admission, WBC 19.78 likely 2/2 to UTI or SBP. UA and paracentesis ordered to assess source of infection.    --Trend CBC. Down trending at 16.55  --One dose rocephin given in ED and receiving third dose today.   --Urine cultures positive for gram negative rebel sensitive to CTX      UTI (urinary tract infection)  Patient with previous history of asymptomatic UTI with Ucx growing E. Coli.  Diagnostics: UA orange hazy appearnace, +1 protein, ketones +1, Bilirubin +1, leukocyte +2, RBC 13, WBC 23, bacteria present. , Ucx pending, CBC remarkable for WBC 19.78 on admission.    -- Cultures grew gram negative rods, susceptibilities finalized. D/C on cefpodoxime 100 m g BID to complete 5 day course  -- Antibiotics: ceftriaxone 1g daily while inpatient.  -- Trend CBC. WBC down trending  -- Strict I/Os  -- place on pure wick      Hyponatremia  Patient presents with Na 131 likely secondary to suspected cirrhosis.     -- Trend Na in the setting of new lasix and spironolactone   -- IVF PRN       Cholelithiasis without cholecystitis  Patient had CTAP 6/23/23 showing cholelithiasis without cholecystitis and was referred to GI. She says that she was not a candidate for surgerical cholecystectomy due to her abdominal distension and liver cirrhosis so she was referred to hepatology. On admission to ED U/S showed "cholelithiasis without secondary signs of cholecystitis and common bile ducts ranging from 5-11 mm however there is no choledocholithiasis or other identified finding which may explain extrahepatic biliary duct dilatation.  Recommend CT abdomen pelvis with contrast for additional characterization."     Will need to follow up outpatient once liver pathology is under control.     -- CTAP:   Cholelithiasis without acute cholecystitis    Biliary tract: No intrahepatic or extrahepatic biliary ductal dilatation.     Gallbladder: There are radiodense gallstones.  There is no gallbladder wall " thickening or pericholecystic fluid.     Pancreas: Normal. No pancreatic ductal dilatation.         Ascites  See cirrhosis of liver with ascites      Transaminitis  Patient has a chronic history of alcohol use disorder with CTAP from 6/23 showing a nodular liver architecture suggestive of cirrhosis. On admission AST 96 and ALT 32.     -- Trend CMP       Elevated TSH  Elevated TSH in the setting of normal T4. Likely subclinical hyperthyroidism. Recommend outpatient follow up.    TSH 7.443  Free T4 1.08    Hypomagnesemia  On admission patient has Mg 1.2 likely due to chronic malnutrition with possible cirrhosis in the setting of alcohol use disorder.    -- Pending CMP  -- Trend Mg and replete with goal Mg > 2        VTE Risk Mitigation (From admission, onward)         Ordered     enoxaparin injection 40 mg  Every 24 hours         07/17/23 0908     IP VTE LOW RISK PATIENT  Once         07/14/23 1916     Place sequential compression device  Until discontinued         07/14/23 1916                Discharge Planning   SRI: 7/18/2023     Code Status: Full Code   Is the patient medically ready for discharge?: Yes    Reason for patient still in hospital (select all that apply): Treatment  Discharge Plan A: Home with family                  Vick Walters MD  Department of Hospital Medicine   [facility] - Tele[facility] Stepdown

## 2023-07-17 NOTE — CONSULTS
[facility]  Psychology    Patient Name: Claire Casper   MRN: [id number]      Consult request received. Psychology will see patient tomorrow, 7/18. Thank you for the consult request.       Bautista Ngo, PhD  Dept. of Psychiatry  [facility]

## 2023-07-17 NOTE — ASSESSMENT & PLAN NOTE
Patient presenting with ascites and abdominal distension. Likely secondary to cirrhosis secondary to ethanol use given history.   Cirrhosis history: First imaging on CTAP 23  Alcohol history: 30+ years of social drinking, mostly mixed drinks that became heavier over the last decade. Patient stopped drinking 2 months ago.  Tylenol / Medication history: Labs negative for elevated acetaminophen.   CTAP Imagin. Morphologic changes of hepatic cirrhosis and findings of portal hypertension including moderate abdominopelvic ascites and upper abdominal varices including paraesophageal varices.   2. Ill-defined hypodensity in the right hepatic lobe highly concerning for hepatocellular carcinoma.  Further evaluation with nonemergent MRI of the abdomen with and without contrast is recommended.   3. Small left pleural effusion.   4. Cholelithiasis without acute cholecystitis.   5. Mild body wall anasarca.     MELD 3.0: 15 at 2023  6:13 AM  MELD-Na: 11 at 2023  6:13 AM  Calculated from:  Serum Creatinine: 0.6 mg/dL (Using min of 1 mg/dL) at 2023  4:53 AM  Serum Sodium: 135 mmol/L at 2023  4:53 AM  Total Bilirubin: 1.5 mg/dL at 2023  4:53 AM  Serum Albumin: 2.4 g/dL at 2023  4:53 AM  INR(ratio): 1.3 at 2023  6:13 AM  Age at listing (hypothetical): 57 years  Sex: Female at 2023  6:13 AM     -- Lasix 20 BID, Spironolactone 50 daily  -- Viral serolgy negative, ferritin elevated but not extremely high, ceruloplasmin normal, alpha 1 antitrypsin high  -- Paracentesis fluid analysis: Ascites wbc 77, glucose 124, albumin 0.6. SAAG 1.9. Portal hypertension without SBP  -- Trend CMP LFTs / INR daily  -- Daily weight and strict I/Os  -- 2nd Paracentesis with IR on  drained 2.8 L from LLQ    Hepatology Consult:  - Would recommend obtaining MRI with/without prior to discharge  - Has outpatient follow up with Dr. Esquivel scheduled for   - Would transition to Lasix 20 mg QD and  Aldactone 50 mg QD; when able to discharge, would send on this regimen  - Agree with serologic workup initiated by primary team, follow up serologic workup- pending ASMA, AMA and RAMÓN

## 2023-07-17 NOTE — MEDICAL/APP STUDENT
[facility] Consult Note  Patient Name: Claire Casper  MRN: [id number]  Location: 8076/8076 A  Attending: Stacey Ness MD   Admit Date: 7/14/2023  Today's Date: 07/17/2023      SUBJECTIVE:     HPI:  Pt is a 57 year old female with a PMHx of alcohol use disorder, ADHD, and DJD was consulted for cirrhosis and concern for HCC on CT abd. She presented on 7/14 for increasing abdominal distension for the past 2 years but has significantly worsened past 2-3 months affecting her ADLs. She has a long Hx of alcohol abuse worsened by difficulty in her marriage and reports drinking at least 5-8 drinks per day but reported stopping in the past 6 months.     Prior to hospitalization she was seen by GI clinic and worked up for possible cholecystectomy due to gallstones found on U/S. She was seen by general surgery but they did not feel comfortable with procedure due to liver function. She was scheduled to have virtual hepatology on 7/19.     During hospital stay, she had IR paracentesis (7/15) draining 3.5L yellow/clear fluid with WBC<77. SAAG 1.9 which is more consistent to portal HTN due to decompensated cirrhosis. She reports relief with drainage however reports most of the distension has returned. There is a discussion with primary team for possible another paracentesis. Currently she is on IV lasix 20mg, Spironolactone 50mg, and CTX.     CT abd was concerning for 15.6mm hypo attenuated mass in the R hepatic lobe.  AFP 5.3 (not elevated)     MELD 16 > 11     Review of patient's allergies indicates:  No Known Allergies    Past Medical History:   Diagnosis Date    ADHD 10/5/2020    COVID-19 virus infection     DJD (degenerative joint disease) of cervical spine     Hx of psychiatric care     Sleep difficulties     Therapy      Past Surgical History:   Procedure Laterality Date    AUGMENTATION OF BREAST      SINUS SURGERY       Family History   Problem Relation Age of Onset    Asthma Mother     Stomach  cancer Mother 70    Coronary artery disease Father     Heart attacks under age 50 Father 45    Hypertension Father      Social History     Tobacco Use    Smoking status: Never    Smokeless tobacco: Never   Substance Use Topics    Alcohol use: Yes     Comment: Wine, typically 1 to 2 glasses    Drug use: Never       All home medications reviewed.    Review of Systems   Constitutional:  Negative for chills, fever and weight loss.   Gastrointestinal:  Positive for abdominal pain and constipation. Negative for blood in stool, diarrhea, nausea and vomiting.     OBJECTIVE:     Vital Signs Trends/History Reviewed  Vitals:    07/17/23 0743 07/17/23 1011 07/17/23 1050 07/17/23 1112   BP: 106/71 102/68 101/66 108/75   BP Location: Left arm      Patient Position: Lying      Pulse: (!) 111 (!) 118  109   Resp: 18 16  19   Temp: 97.4 °F (36.3 °C)      TempSrc: Oral      SpO2: 96% 97%  95%   Weight:       Height:             Physical Exam  Constitutional:       General: She is not in acute distress.     Appearance: Normal appearance. She is normal weight. She is not ill-appearing or toxic-appearing.   Cardiovascular:      Rate and Rhythm: Normal rate and regular rhythm.      Pulses: Normal pulses.      Heart sounds: Normal heart sounds.   Abdominal:      General: There is distension.      Palpations: There is no mass.      Tenderness: There is no abdominal tenderness. There is no guarding or rebound.      Hernia: No hernia is present.   Neurological:      Mental Status: She is alert.         Laboratory: All labs within last 24 hours reviewed.     MELD:   MELD 3.0: 15 at 7/17/2023  6:13 AM  MELD-Na: 11 at 7/17/2023  6:13 AM  Calculated from:  Serum Creatinine: 0.6 mg/dL (Using min of 1 mg/dL) at 7/16/2023  4:53 AM  Serum Sodium: 135 mmol/L at 7/16/2023  4:53 AM  Total Bilirubin: 1.5 mg/dL at 7/16/2023  4:53 AM  Serum Albumin: 2.4 g/dL at 7/16/2023  4:53 AM  INR(ratio): 1.3 at 7/17/2023  6:13 AM  Age at listing (hypothetical): 57  years  Sex: Female at 7/17/2023  6:13 AM        Imaging: All imaging within last 24 hours reviewed.       Infusions:        Scheduled Medications:    cefTRIAXone (ROCEPHIN) IVPB  1 g Intravenous Q24H    enoxparin  40 mg Subcutaneous Q24H (prophylaxis, 1700)    folic acid  1 mg Oral Daily    furosemide (LASIX) injection  20 mg Intravenous BID loop    multivitamin  1 tablet Oral Daily    potassium chloride  10 mEq Oral Daily    senna-docusate 8.6-50 mg  1 tablet Oral BID    spironolactone  50 mg Oral Daily    thiamine  100 mg Oral Daily       PRN Medications:   acetaminophen, cloNIDine, dextrose 10%, dextrose 10%, glucagon (human recombinant), glucose, glucose, hydrOXYzine HCL, melatonin, naloxone, sodium chloride 0.9%, sodium chloride 0.9%    ASSESSMENT & RECOMMENDATIONS     Pt is a 57 year old female with a PMHx of alcohol use disorder, ADHD, and DJD was consulted for cirrhosis and concern for HCC. Imaging showed 15.6cm hypodense mass in the R hepatic lobe with non elevated AFP 5.3.  Paracentesis with 77 WBCs, SAAG 1.9 consistent with portal HTN 2/2 to alcoholic cirrhosis.     Our recommendations are as follows:    - recommend MRI w/wo before discharge    - f/u outpatient with hepatology 7/19    - continue Lasix 20mg QD and Spironlactone 50 mg QD    - f/u on serologic w/u (ASMA, AMA, RAMÓN) with  primary     Thank you for allowing us to participate in the care of this patient. Please call with questions.    Allen Melendrez MS4  [facility]

## 2023-07-17 NOTE — ASSESSMENT & PLAN NOTE
Patient with previous history of asymptomatic UTI with Ucx growing E. Coli.  Diagnostics: UA orange hazy appearnace, +1 protein, ketones +1, Bilirubin +1, leukocyte +2, RBC 13, WBC 23, bacteria present. , Ucx pending, CBC remarkable for WBC 19.78 on admission.    -- Cultures grew gram negative rods, susceptibilities finalized. D/C on cefpodoxime 100 m g BID to complete 5 day course  -- Antibiotics: ceftriaxone 1g daily while inpatient.  -- Trend CBC. WBC down trending  -- Strict I/Os  -- place on pure wick

## 2023-07-17 NOTE — CARE UPDATE
"RAPID RESPONSE NURSE CHART REVIEW       Chart Reviewed: 07/17/2023, 7:28 AM    MRN: [id number]  Bed: 8076/8076 A    Dx: Cirrhosis of liver with ascites    Claire Casper has a past medical history of ADHD, COVID-19 virus infection, DJD (degenerative joint disease) of cervical spine, psychiatric care, Sleep difficulties, and Therapy.    Last VS: BP 99/61   Pulse (!) 111   Temp 98.1 °F (36.7 °C)   Resp 16   Ht 5' 4" (1.626 m)   Wt 58 kg (127 lb 13.9 oz)   SpO2 96%   BMI 21.95 kg/m²     24H Vital Sign Range:  Temp:  [97.5 °F (36.4 °C)-98.3 °F (36.8 °C)]   Pulse:  [108-123]   Resp:  [16]   BP: ()/(61-86)   SpO2:  [96 %-97 %]     Level of Consciousness (AVPU): alert    Recent Labs     07/14/23  1455 07/15/23  0436 07/16/23  0453   WBC 19.78* 20.05* 17.75*   HGB 10.3* 9.5* 9.6*   HCT 31.7* 27.6* 28.9*    284 292       Recent Labs     07/14/23  1455 07/15/23  0436 07/16/23  0453   * 134* 135*   K 4.1 3.4* 3.3*    100 105   CO2 20* 21* 20*   CREATININE 0.6 0.6 0.6   GLU 92 87 106   PHOS  --  2.5* 2.6*   MG 1.2* 1.8 1.3*        No results for input(s): PH, PCO2, PO2, HCO3, POCSATURATED, BE in the last 72 hours.     OXYGEN:             MEWS score: 4    Charge RN, Catalino  contacted. No concerns verbalized at this time. Instructed to call [phone] for further concerns or assistance.    Jenna Bingham RN        "

## 2023-07-17 NOTE — H&P
Inpatient Radiology Pre-procedure Note    History of Present Illness:  Claire Casper is a 57 y.o. female who presents for ultrasound guided paracentesis.  Admission H&P reviewed.  Past Medical History:   Diagnosis Date    ADHD 10/5/2020    COVID-19 virus infection     DJD (degenerative joint disease) of cervical spine     Hx of psychiatric care     Sleep difficulties     Therapy      Past Surgical History:   Procedure Laterality Date    AUGMENTATION OF BREAST      SINUS SURGERY         Review of Systems:   As documented in primary team H&P    Home Meds:   Prior to Admission medications    Medication Sig Start Date End Date Taking? Authorizing Provider   bisacodyL (DULCOLAX) 5 mg EC tablet Take 5 mg by mouth daily as needed for Constipation.   Yes Historical Provider   cloNIDine (CATAPRES) 0.1 MG tablet Take 1 tab by mouth each evening as needed for sleep 5/4/23  Yes Brock Jones III, NP   dextroamphetamine-amphetamine 30 mg Tab Take 1 tablet (30 mg total) by mouth Daily. 6/22/23  Yes Brock Jones III, NP   ibuprofen (ADVIL,MOTRIN) 200 MG tablet Take 200-400 mg by mouth every 8 (eight) hours as needed for Pain.   Yes Historical Provider   loratadine (CLARITIN) 10 mg tablet Take 10 mg by mouth daily as needed for Allergies.   Yes Historical Provider   dicyclomine (BENTYL) 20 mg tablet Take 1 tablet (20 mg total) by mouth 4 (four) times daily as needed (abdominal).  Patient not taking: Reported on 7/15/2023 6/22/23 7/22/23  Shannon Cadet NP     Scheduled Meds:    cefTRIAXone (ROCEPHIN) IVPB  1 g Intravenous Q24H    enoxparin  40 mg Subcutaneous Q24H (prophylaxis, 1700)    folic acid  1 mg Oral Daily    furosemide (LASIX) injection  20 mg Intravenous BID loop    multivitamin  1 tablet Oral Daily    potassium chloride  10 mEq Oral Daily    senna-docusate 8.6-50 mg  1 tablet Oral BID    spironolactone  50 mg Oral Daily    thiamine  100 mg Oral Daily     Continuous Infusions:   PRN Meds:acetaminophen,  cloNIDine, dextrose 10%, dextrose 10%, glucagon (human recombinant), glucose, glucose, hydrOXYzine HCL, melatonin, naloxone, sodium chloride 0.9%, sodium chloride 0.9%  Anticoagulants/Antiplatelets: no anticoagulation    Allergies: Review of patient's allergies indicates:  No Known Allergies  Sedation Hx: have not been any systemic reactions    Vitals:  Temp: 97.4 °F (36.3 °C) (07/17/23 0743)  Pulse: (!) 118 (07/17/23 1011)  Resp: 16 (07/17/23 1011)  BP: 102/68 (07/17/23 1011)  SpO2: 97 % (07/17/23 1011)     Physical Exam:  ASA: 3  Mallampati: n/a    General: no acute distress  Mental Status: alert and oriented to person, place and time  HEENT: normocephalic, atraumatic  Chest: unlabored breathing  Heart: regular heart rate  Abdomen: distended  Extremity: moves all extremities    Plan: ultrasound guided paracentesis  Sedation Plan: local    KULDIP Gambino, FNP  Interventional Radiology  [phone] [facility]

## 2023-07-17 NOTE — CONSULTS
[facility]  Hepatology  Consult Note    Patient Name: Claire Casper  MRN: [id number]  Admission Date: 7/14/2023  Hospital Length of Stay: 0 days  Code Status: Full Code   Attending Provider: Stacey Ness MD   Consulting Provider: Roman Rich DO  Primary Care Physician: Gogo Ramirez MD  Principal Problem:Cirrhosis of liver with ascites    Inpatient consult to Hepatology  Consult performed by: Roman Rich DO  Consult ordered by: Vick Walters MD      Subjective:     HPI: Claire Casper is a 57 y.o. female with ADHD, alcohol use disorder and newly diagnosed cirrhosis (presumably 2/2 EtOH) that was admitted to  with several months of worsening abdominal distention. CT A/P with morphologic changes of hepatic cirrhosis and findings of portal hypertension including moderate ascites and varices including paraesophageal varices. Additionally, concerning 1.6 cm ill-defined hypodensity in the right hepatic lobe was noted; concerning for hepatocellular carcinoma. Primary team treating GNR UTI. Hepatology consulted for cirrhosis and concern for HCC. AFP 5.3 on this admission. Para studies with 77 WBC's, total protein 1.0, SAAG 1.9.     Past Medical History:   Diagnosis Date    ADHD 10/5/2020    COVID-19 virus infection     DJD (degenerative joint disease) of cervical spine     Hx of psychiatric care     Sleep difficulties     Therapy        Past Surgical History:   Procedure Laterality Date    AUGMENTATION OF BREAST      SINUS SURGERY         Family History   Problem Relation Age of Onset    Asthma Mother     Stomach cancer Mother 70    Coronary artery disease Father     Heart attacks under age 50 Father 45    Hypertension Father        Social History     Socioeconomic History    Marital status:    Tobacco Use    Smoking status: Never    Smokeless tobacco: Never   Substance and Sexual Activity    Alcohol use: Yes     Comment: Wine, typically 1 to 2 glasses    Drug  use: Never    Sexual activity: Yes     Partners: Male   Social History Narrative    ** Merged History Encounter **          Social Determinants of Health     Financial Resource Strain: Low Risk     Difficulty of Paying Living Expenses: Not very hard   Food Insecurity: No Food Insecurity    Worried About Running Out of Food in the Last Year: Never true    Ran Out of Food in the Last Year: Never true   Transportation Needs: No Transportation Needs    Lack of Transportation (Medical): No    Lack of Transportation (Non-Medical): No   Physical Activity: Inactive    Days of Exercise per Week: 0 days    Minutes of Exercise per Session: 0 min   Stress: Stress Concern Present    Feeling of Stress : To some extent   Social Connections: Moderately Isolated    Frequency of Communication with Friends and Family: Three times a week    Frequency of Social Gatherings with Friends and Family: Three times a week    Attends Evangelical Services: Never    Active Member of Clubs or Organizations: No    Attends Club or Organization Meetings: Never    Marital Status:    Housing Stability: Low Risk     Unable to Pay for Housing in the Last Year: No    Number of Places Lived in the Last Year: 1    Unstable Housing in the Last Year: No       No current facility-administered medications on file prior to encounter.     Current Outpatient Medications on File Prior to Encounter   Medication Sig Dispense Refill    bisacodyL (DULCOLAX) 5 mg EC tablet Take 5 mg by mouth daily as needed for Constipation.      cloNIDine (CATAPRES) 0.1 MG tablet Take 1 tab by mouth each evening as needed for sleep 90 tablet 3    dextroamphetamine-amphetamine 30 mg Tab Take 1 tablet (30 mg total) by mouth Daily. 30 tablet 0    ibuprofen (ADVIL,MOTRIN) 200 MG tablet Take 200-400 mg by mouth every 8 (eight) hours as needed for Pain.      loratadine (CLARITIN) 10 mg tablet Take 10 mg by mouth daily as needed for Allergies.      dicyclomine (BENTYL) 20 mg tablet Take  1 tablet (20 mg total) by mouth 4 (four) times daily as needed (abdominal). (Patient not taking: Reported on 7/15/2023) 120 tablet 0       Review of patient's allergies indicates:  No Known Allergies    Review of Systems   Constitutional:  Negative for chills and fever.   Respiratory:  Negative for cough and shortness of breath.    Cardiovascular:  Negative for chest pain and palpitations.   Gastrointestinal:  Negative for abdominal pain, blood in stool, constipation, diarrhea, nausea and vomiting.   Neurological:  Negative for dizziness and headaches.      Objective:     Vitals:    07/16/23 2005   BP: 119/84   Pulse: (!) 122   Resp: 16   Temp: 98.3 °F (36.8 °C)         Constitutional:  not in acute distress and well developed  HENT: Head: Normal, normocephalic, atraumatic.  Eyes: conjunctiva clear and sclera nonicteric  Cardiovascular: regular rate and rhythm  Respiratory: normal chest expansion & respiratory effort   and no accessory muscle use  GI: soft, non-tender, without masses or organomegaly  Musculoskeletal: no muscular tenderness noted  Skin: normal color  Neurological: alert, oriented x3  Psychiatric: mood and affect are within normal limits, pt is a good historian; no memory problems were noted    Significant Labs:  Recent Labs   Lab 07/14/23  1455 07/15/23  0436 07/16/23  0453   HGB 10.3* 9.5* 9.6*       Lab Results   Component Value Date    WBC 17.75 (H) 07/16/2023    HGB 9.6 (L) 07/16/2023    HCT 28.9 (L) 07/16/2023     (H) 07/16/2023     07/16/2023       Lab Results   Component Value Date     (L) 07/16/2023    K 3.3 (L) 07/16/2023     07/16/2023    CO2 20 (L) 07/16/2023    BUN 6 07/16/2023    CREATININE 0.6 07/16/2023    CALCIUM 8.8 07/16/2023    ANIONGAP 10 07/16/2023    ESTGFRAFRICA >60.0 02/11/2021    EGFRNONAA >60.0 02/11/2021       Lab Results   Component Value Date    ALT 28 07/16/2023    AST 76 (H) 07/16/2023    ALKPHOS 165 (H) 07/16/2023    BILITOT 1.5 (H) 07/16/2023        Lab Results   Component Value Date    INR 1.3 (H) 07/16/2023    INR 1.3 (H) 07/15/2023    INR 1.3 (H) 07/14/2023       Significant Imaging:  Reviewed pertinent radiology findings.       Assessment/Plan:     Claire Casper is a 57 y.o. female with ADHD, alcohol use disorder and newly diagnosed cirrhosis (presumably 2/2 EtOH) that was admitted to  with several months of worsening abdominal distention. Hepatology consulted for cirrhosis and concern for HCC given CT with 1.6 cm ill-defined hypodensity in the right hepatic lobe. AFP 5.3 on this admission. Para studies with 77 WBC's, total protein 1.0, SAAG 1.9.     Problem List:  EtOH Cirrhosis   Ascites   Liver Mass     Recommendations:    - Would recommend obtaining MRI with/without prior to discharge  - Has outpatient follow up with Dr. Esquivel scheduled for 7/19  - Would transition to Lasix 20 mg QD and Aldactone 50 mg QD; when able to discharge, would send on this regimen  - Agree with serologic workup initiated by primary team, follow up serologic workup- pending ASMA, AMA and RAMÓN    Thank you for involving us in the care of Claire Casper. Please call with any additional questions, concerns or changes in the patient's clinical status. We will continue to follow.     Roman Rich DO  Gastroenterology Fellow PGY-IV  [facility]

## 2023-07-18 VITALS
SYSTOLIC BLOOD PRESSURE: 114 MMHG | OXYGEN SATURATION: 95 % | HEART RATE: 116 BPM | BODY MASS INDEX: 20.7 KG/M2 | TEMPERATURE: 98 F | DIASTOLIC BLOOD PRESSURE: 80 MMHG | RESPIRATION RATE: 18 BRPM | WEIGHT: 121.25 LBS | HEIGHT: 64 IN

## 2023-07-18 PROBLEM — F10.90 ALCOHOL USE DISORDER: Status: ACTIVE | Noted: 2023-07-18

## 2023-07-18 LAB
ALBUMIN SERPL BCP-MCNC: 2.2 G/DL (ref 3.5–5.2)
ALP SERPL-CCNC: 172 U/L (ref 55–135)
ALT SERPL W/O P-5'-P-CCNC: 29 U/L (ref 10–44)
ANION GAP SERPL CALC-SCNC: 8 MMOL/L (ref 8–16)
AST SERPL-CCNC: 82 U/L (ref 10–40)
BASOPHILS # BLD AUTO: 0.14 K/UL (ref 0–0.2)
BASOPHILS NFR BLD: 0.7 % (ref 0–1.9)
BILIRUB SERPL-MCNC: 1.2 MG/DL (ref 0.1–1)
BUN SERPL-MCNC: 7 MG/DL (ref 6–20)
CALCIUM SERPL-MCNC: 8.4 MG/DL (ref 8.7–10.5)
CHLORIDE SERPL-SCNC: 101 MMOL/L (ref 95–110)
CO2 SERPL-SCNC: 21 MMOL/L (ref 23–29)
CREAT SERPL-MCNC: 0.6 MG/DL (ref 0.5–1.4)
DIFFERENTIAL METHOD: ABNORMAL
EOSINOPHIL # BLD AUTO: 0.9 K/UL (ref 0–0.5)
EOSINOPHIL NFR BLD: 4.6 % (ref 0–8)
ERYTHROCYTE [DISTWIDTH] IN BLOOD BY AUTOMATED COUNT: 16.9 % (ref 11.5–14.5)
EST. GFR  (NO RACE VARIABLE): >60 ML/MIN/1.73 M^2
GLUCOSE SERPL-MCNC: 100 MG/DL (ref 70–110)
HCT VFR BLD AUTO: 28.8 % (ref 37–48.5)
HGB BLD-MCNC: 9.5 G/DL (ref 12–16)
IMM GRANULOCYTES # BLD AUTO: 0.1 K/UL (ref 0–0.04)
IMM GRANULOCYTES NFR BLD AUTO: 0.5 % (ref 0–0.5)
INR PPP: 1.2 (ref 0.8–1.2)
LYMPHOCYTES # BLD AUTO: 2.4 K/UL (ref 1–4.8)
LYMPHOCYTES NFR BLD: 12.6 % (ref 18–48)
MAGNESIUM SERPL-MCNC: 2.3 MG/DL (ref 1.6–2.6)
MCH RBC QN AUTO: 35.3 PG (ref 27–31)
MCHC RBC AUTO-ENTMCNC: 33 G/DL (ref 32–36)
MCV RBC AUTO: 107 FL (ref 82–98)
MONOCYTES # BLD AUTO: 1.3 K/UL (ref 0.3–1)
MONOCYTES NFR BLD: 6.8 % (ref 4–15)
NEUTROPHILS # BLD AUTO: 14 K/UL (ref 1.8–7.7)
NEUTROPHILS NFR BLD: 74.8 % (ref 38–73)
NRBC BLD-RTO: 0 /100 WBC
PHOSPHATE SERPL-MCNC: 2.8 MG/DL (ref 2.7–4.5)
PLATELET # BLD AUTO: 273 K/UL (ref 150–450)
PMV BLD AUTO: 10 FL (ref 9.2–12.9)
POTASSIUM SERPL-SCNC: 3.4 MMOL/L (ref 3.5–5.1)
PROT SERPL-MCNC: 5.5 G/DL (ref 6–8.4)
PROTHROMBIN TIME: 13 SEC (ref 9–12.5)
RBC # BLD AUTO: 2.69 M/UL (ref 4–5.4)
SODIUM SERPL-SCNC: 130 MMOL/L (ref 136–145)
WBC # BLD AUTO: 18.76 K/UL (ref 3.9–12.7)

## 2023-07-18 PROCEDURE — 85610 PROTHROMBIN TIME: CPT

## 2023-07-18 PROCEDURE — 63600175 PHARM REV CODE 636 W HCPCS: Performed by: HOSPITALIST

## 2023-07-18 PROCEDURE — 80053 COMPREHEN METABOLIC PANEL: CPT

## 2023-07-18 PROCEDURE — 90832 PR PSYCHOTHERAPY W/PATIENT, 30 MIN: ICD-10-PCS | Mod: ,,, | Performed by: STUDENT IN AN ORGANIZED HEALTH CARE EDUCATION/TRAINING PROGRAM

## 2023-07-18 PROCEDURE — 36415 COLL VENOUS BLD VENIPUNCTURE: CPT

## 2023-07-18 PROCEDURE — 25000003 PHARM REV CODE 250

## 2023-07-18 PROCEDURE — 85025 COMPLETE CBC W/AUTO DIFF WBC: CPT

## 2023-07-18 PROCEDURE — 90832 PSYTX W PT 30 MINUTES: CPT | Mod: ,,, | Performed by: STUDENT IN AN ORGANIZED HEALTH CARE EDUCATION/TRAINING PROGRAM

## 2023-07-18 PROCEDURE — 99238 PR HOSPITAL DISCHARGE DAY,<30 MIN: ICD-10-PCS | Mod: 25,GC,, | Performed by: HOSPITALIST

## 2023-07-18 PROCEDURE — 84100 ASSAY OF PHOSPHORUS: CPT

## 2023-07-18 PROCEDURE — 99238 HOSP IP/OBS DSCHRG MGMT 30/<: CPT | Mod: 25,GC,, | Performed by: HOSPITALIST

## 2023-07-18 PROCEDURE — 63600175 PHARM REV CODE 636 W HCPCS

## 2023-07-18 PROCEDURE — 83735 ASSAY OF MAGNESIUM: CPT

## 2023-07-18 RX ORDER — LANOLIN ALCOHOL/MO/W.PET/CERES
400 CREAM (GRAM) TOPICAL DAILY
Qty: 30 TABLET | Refills: 0 | Status: SHIPPED | OUTPATIENT
Start: 2023-07-18 | End: 2023-07-18 | Stop reason: SDUPTHER

## 2023-07-18 RX ORDER — POTASSIUM CHLORIDE 750 MG/1
10 CAPSULE, EXTENDED RELEASE ORAL DAILY
Qty: 30 CAPSULE | Refills: 0 | Status: SHIPPED | OUTPATIENT
Start: 2023-07-18 | End: 2023-08-17

## 2023-07-18 RX ORDER — LANOLIN ALCOHOL/MO/W.PET/CERES
400 CREAM (GRAM) TOPICAL 2 TIMES DAILY
Qty: 60 TABLET | Refills: 0 | Status: SHIPPED | OUTPATIENT
Start: 2023-07-18 | End: 2023-08-17

## 2023-07-18 RX ORDER — FOLIC ACID 1 MG/1
1 TABLET ORAL DAILY
Qty: 90 TABLET | Refills: 3 | Status: SHIPPED | OUTPATIENT
Start: 2023-07-18 | End: 2023-09-14 | Stop reason: SDUPTHER

## 2023-07-18 RX ORDER — CEFPODOXIME PROXETIL 200 MG/1
200 TABLET, FILM COATED ORAL EVERY 12 HOURS
Qty: 4 TABLET | Refills: 0 | Status: SHIPPED | OUTPATIENT
Start: 2023-07-18 | End: 2023-07-20

## 2023-07-18 RX ORDER — HYDROXYZINE HYDROCHLORIDE 25 MG/1
25 TABLET, FILM COATED ORAL DAILY PRN
Qty: 30 TABLET | Refills: 0 | Status: SHIPPED | OUTPATIENT
Start: 2023-07-18 | End: 2023-08-17

## 2023-07-18 RX ORDER — POTASSIUM CHLORIDE 750 MG/1
30 CAPSULE, EXTENDED RELEASE ORAL ONCE
Status: COMPLETED | OUTPATIENT
Start: 2023-07-18 | End: 2023-07-18

## 2023-07-18 RX ORDER — SPIRONOLACTONE 50 MG/1
50 TABLET, FILM COATED ORAL DAILY
Qty: 90 TABLET | Refills: 3 | Status: SHIPPED | OUTPATIENT
Start: 2023-07-18 | End: 2023-09-14 | Stop reason: SDUPTHER

## 2023-07-18 RX ORDER — FUROSEMIDE 20 MG/1
20 TABLET ORAL DAILY
Qty: 90 TABLET | Refills: 3 | Status: SHIPPED | OUTPATIENT
Start: 2023-07-18 | End: 2023-09-14 | Stop reason: SDUPTHER

## 2023-07-18 RX ORDER — HYDROXYZINE HYDROCHLORIDE 25 MG/1
25 TABLET, FILM COATED ORAL DAILY PRN
Qty: 30 TABLET | Refills: 0 | Status: SHIPPED | OUTPATIENT
Start: 2023-07-18 | End: 2023-07-18 | Stop reason: SDUPTHER

## 2023-07-18 RX ADMIN — THERA TABS 1 TABLET: TAB at 08:07

## 2023-07-18 RX ADMIN — SENNOSIDES AND DOCUSATE SODIUM 1 TABLET: 50; 8.6 TABLET ORAL at 08:07

## 2023-07-18 RX ADMIN — POTASSIUM CHLORIDE 30 MEQ: 10 CAPSULE, COATED, EXTENDED RELEASE ORAL at 11:07

## 2023-07-18 RX ADMIN — FUROSEMIDE 20 MG: 20 TABLET ORAL at 08:07

## 2023-07-18 RX ADMIN — POTASSIUM CHLORIDE 10 MEQ: 10 CAPSULE, COATED, EXTENDED RELEASE ORAL at 08:07

## 2023-07-18 RX ADMIN — FOLIC ACID 1 MG: 1 TABLET ORAL at 08:07

## 2023-07-18 RX ADMIN — CEFTRIAXONE 1 G: 1 INJECTION, POWDER, FOR SOLUTION INTRAMUSCULAR; INTRAVENOUS at 08:07

## 2023-07-18 RX ADMIN — HYDROXYZINE HYDROCHLORIDE 25 MG: 25 TABLET, FILM COATED ORAL at 01:07

## 2023-07-18 RX ADMIN — SPIRONOLACTONE 50 MG: 25 TABLET, FILM COATED ORAL at 08:07

## 2023-07-18 RX ADMIN — THIAMINE HCL TAB 100 MG 100 MG: 100 TAB at 08:07

## 2023-07-18 RX ADMIN — MAGNESIUM SULFATE 2 G: 2 INJECTION INTRAVENOUS at 01:07

## 2023-07-18 RX ADMIN — MAGNESIUM SULFATE 2 G: 2 INJECTION INTRAVENOUS at 12:07

## 2023-07-18 NOTE — ASSESSMENT & PLAN NOTE
On admission patient has Mg 1.2 likely due to chronic malnutrition with possible cirrhosis in the setting of alcohol use disorder.    -- Pending CMP  -- Trend Mg and replete with goal Mg > 2  -- Discharged on 400 mg oral Magnesium Oxide BID

## 2023-07-18 NOTE — ASSESSMENT & PLAN NOTE
On admission, WBC 19.78 likely 2/2 to UTI or SBP. UA and paracentesis ordered to assess source of infection. Elevation likely 2/2 cirrhosis    --Trend CBC. Down trending with antibiotics.  --One dose rocephin given in ED and receiving third dose today.   --Urine cultures positive for gram negative rebel sensitive to CTX

## 2023-07-18 NOTE — NURSING
Patient discharged home with all belongings. Discharge instructions reviewed with patient- verbalized understanding. Medications delivered to bedside.

## 2023-07-18 NOTE — DISCHARGE SUMMARY
"[facility] - Tele[facility]  Discharge Summary      Patient Name: Claire Casper  MRN: [id number]  TASHIA: [id number]  Patient Class: IP- Inpatient  Admission Date: 7/14/2023  Hospital Length of Stay: 2 days  Discharge Date and Time:  07/18/2023 11:52 AM  Attending Physician: Stacey Ness MD   Discharging Provider: Vick Walters MD  Primary Care Provider: Gogo Ramirez MD  [facility] Medicine Team: [facility] Vick Walters MD  Primary Care Team: [facility] 1    HPI:   This is a 57 year old woman with PMH ADHD on Adderall daily, insomnia on clonidine, DJD (degenerative joint disease) of cervical spine, previous breast augmentation, chronic constipation and alcoholism. She presents to the ED with worsening abdominal swelling worsening over last 2 months, with acutely worsened dyspnea and midsternal chest pain in the last few days. She visited urgent care for the symptoms and was referred to GI and for imaging. She received CTAP w/wo contrast on 6/23/23 showing "Hepatomegaly. Liver nodularity raising question of cirrhosis. Mild ascites. Cholelithiasis without cholecystitis. Diverticulosis without diverticulitis. She saw GI and was referred to hepatology where she has a scheduled appointment next week. She presented to ED before this appointment because her abdominal distension is becoming increasingly painful, she does not want to eat because she has a feeling of fullness, and she is constipated with last bowel movement 10 days ago. She reports decades of social drinking, 5-8 drinks, that increased over the last decade. Since the distension and discomfort started 2 months ago she denies having any alcoholic drinks. She endorses shortness of breath on exertion and is using a walker, constipation, and abdominal pain. She denies fever, chills, sweating, nausea, vomiting, chest pain, palpitations, cough, dysuria, excessive bleeding or brusing.    On ED admission she received an " "abdominal U/S showing ascites, cholelithiasis without secondary signs of cholecystitis with CBD ranging from 5-11 mm with no choledocholithiasis or other explanation for extrahepatic biliary duct dilation. CTAP with contrast ordered and results pending.CXR showed: "Heart size normal.  Ground-glass opacification of the lower lung fields consistent with underlying small amount of pleural effusion and atelectatic changes.  The lung apices are clear". UA results: orange hazy appearnace, +1 protein, ketones +1, Bilirubin +1, leukocyte +2, RBC 13, WBC 23, bacteria present, cultures sent. Labs notable for WBC 19.78, H/H 10.3/31.7, MCV 11, PT 13.9, INR 1.3, Na 131, CO2 20, Mg 1.2, Alk Jt 187, Tbili 2.2, Direct bili 0.8, AST 96, ALT 32 (wnl), TSH 7.443. Labs negative for elevated acetaminophen levels. EKG showed low voltage QRS in limb leads. IR paracentesis planned with fluid analysis to rule out SBP. Patient started on ceftriaxone in ED, spironolactone and lasix ordered on admission to floor.        * No surgery found *      Hospital Course:   Following admission for abdominal distension and leukocytosis of 19.78 thought to be from a UTI or SBP. CTAP results showed symptoms were likely due to hepatic cirrhosis with additional findings of portal hypertension with moderate abdominopelvic ascites and upper abdominal varices including paraesophageal varices and an ll-defined hypodensity in the right hepatic lobe highly concerning for hepatocellular carcinoma. Cirrhosis panel was ordered and patient was started on lasix/spironolactone. Patient had 3.5 L of fluid drained with paracentesis on 7/15 and 2.8 L of fluid on 7/17 with fluid analysis negative for SBP, and SAAG indicates portal hypertension. Hepatology was consulted and recommended outpatient follow up along with MRI w wo contrast of abdomen. Patient to be discharged on 20 mg lasix and 50 mg spironolactone daily. Patient was discharged with oral antibiotics to complete " course for UTI. Attempted to send home with home health, but due to lack of insurance coverage, was unable to be obtained. Patient to follow up with hepatology, primary care, and psychology after discharge.           Goals of Care Treatment Preferences:  Code Status: Full Code    Physical Exam  Constitutional:       Appearance: Normal appearance. She is ill-appearing.   HENT:      Head: Normocephalic and atraumatic.      Right Ear: External ear normal.      Left Ear: External ear normal.      Nose: Nose normal. No congestion or rhinorrhea.      Mouth/Throat:      Mouth: Mucous membranes are moist.      Pharynx: Oropharynx is clear. No oropharyngeal exudate or posterior oropharyngeal erythema.   Eyes:      General: Scleral icterus present.      Extraocular Movements: Extraocular movements intact.      Pupils: Pupils are equal, round, and reactive to light.   Cardiovascular:      Rate and Rhythm: Tachycardia present.      Pulses: Normal pulses.      Heart sounds: Normal heart sounds. No murmur heard.  Pulmonary:      Effort: Pulmonary effort is normal.      Breath sounds: Normal breath sounds. No wheezing or rales.   Abdominal:      General: There is distension.      Tenderness: There is abdominal tenderness.   Musculoskeletal:         General: Normal range of motion.      Cervical back: Normal range of motion. No rigidity.   Skin:     General: Skin is warm and dry.      Coloration: Skin is not jaundiced.   Neurological:      General: No focal deficit present.      Mental Status: She is alert and oriented to person, place, and time.   Psychiatric:         Mood and Affect: Mood normal.         Consults:   Consults (From admission, onward)        Status Ordering Provider     Inpatient consult to Hepatology  Once        Provider:  (Not yet assigned)    MILADY Eid     Inpatient consult to Psychology  Once        Provider:  (Not yet assigned)    MILADY Eid            Renal/  UTI (urinary  tract infection)  Patient with previous history of asymptomatic UTI with Ucx growing E. Coli.  Diagnostics: UA orange hazy appearnace, +1 protein, ketones +1, Bilirubin +1, leukocyte +2, RBC 13, WBC 23, bacteria present. , Ucx pending, CBC remarkable for WBC 19.78 on admission.    -- Cultures grew gram negative rods, susceptibilities finalized. D/C on cefpodoxime 100 m g BID to complete 5 day course  -- Antibiotics: ceftriaxone 1g daily while inpatient.  -- Trend CBC. WBC down trending  -- Strict I/Os  -- place on pure wick      Hyponatremia  Patient presents with Na 131 likely secondary to suspected cirrhosis.     -- Trend Na in the setting of new lasix and spironolactone   -- IVF PRN       Hypomagnesemia  On admission patient has Mg 1.2 likely due to chronic malnutrition with possible cirrhosis in the setting of alcohol use disorder.    -- Pending CMP  -- Trend Mg and replete with goal Mg > 2  -- Discharged on 400 mg oral Magnesium Oxide BID      Oncology  Leukocytosis  On admission, WBC 19.78 likely 2/2 to UTI or SBP. UA and paracentesis ordered to assess source of infection. Elevation likely 2/2 cirrhosis    --Trend CBC. Down trending with antibiotics.  --One dose rocephin given in ED and receiving third dose today.   --Urine cultures positive for gram negative rebel sensitive to CTX      GI  * Cirrhosis of liver with ascites  Patient presenting with ascites and abdominal distension. Likely secondary to cirrhosis secondary to ethanol use given history.   Cirrhosis history: First imaging on CTAP 23  Alcohol history: 30+ years of social drinking, mostly mixed drinks that became heavier over the last decade. Patient stopped drinking 2 months ago.  Tylenol / Medication history: Labs negative for elevated acetaminophen.   CTAP Imagin. Morphologic changes of hepatic cirrhosis and findings of portal hypertension including moderate abdominopelvic ascites and upper abdominal varices including paraesophageal  varices.   2. Ill-defined hypodensity in the right hepatic lobe highly concerning for hepatocellular carcinoma.  Further evaluation with nonemergent MRI of the abdomen with and without contrast is recommended.   3. Small left pleural effusion.   4. Cholelithiasis without acute cholecystitis.   5. Mild body wall anasarca.     MELD 3.0: 18 at 7/18/2023  5:48 AM  MELD-Na: 9 at 7/18/2023  5:48 AM  Calculated from:  Serum Creatinine: 0.6 mg/dL (Using min of 1 mg/dL) at 7/18/2023  5:48 AM  Serum Sodium: 130 mmol/L at 7/18/2023  5:48 AM  Total Bilirubin: 1.2 mg/dL at 7/18/2023  5:48 AM  Serum Albumin: 2.2 g/dL at 7/18/2023  5:48 AM  INR(ratio): 1.2 at 7/18/2023  5:48 AM  Age at listing (hypothetical): 57 years  Sex: Female at 7/18/2023  5:48 AM     -- Lasix 20 BID, Spironolactone 50 daily  -- Viral serolgy negative, ferritin elevated but not extremely high, ceruloplasmin normal, alpha 1 antitrypsin high  -- Paracentesis fluid analysis: Ascites wbc 77, glucose 124, albumin 0.6. SAAG 1.9. Portal hypertension without SBP  -- Trend CMP LFTs / INR daily  -- Daily weight and strict I/Os  -- 2nd Paracentesis with IR on 7/17 drained 2.8 L from LLQ  -- Patient to receive outpatient MRI w/without outpatient    Hepatology Consult:  - Would recommend obtaining MRI with/without prior to discharge  - Has outpatient follow up with Dr. Esquivel scheduled for 7/19  - Would transition to Lasix 20 mg QD and Aldactone 50 mg QD; when able to discharge, would send on this regimen  - Agree with serologic workup initiated by primary team, follow up serologic workup- pending ASMA, AMA and RAMÓN            Ascites  See cirrhosis of liver with ascites        Final Active Diagnoses:    Diagnosis Date Noted POA    PRINCIPAL PROBLEM:  Cirrhosis of liver with ascites [K74.60, R18.8] 07/14/2023 Unknown    Ascites [R18.8] 07/14/2023 Unknown    Cholelithiasis without cholecystitis [K80.20] 07/14/2023 Unknown    Hyponatremia [E87.1] 07/14/2023 Unknown     UTI (urinary tract infection) [N39.0] 07/14/2023 Unknown    Leukocytosis [D72.829] 07/14/2023 Unknown    Hypomagnesemia [E83.42] 03/24/2021 Yes    Transaminitis [R74.01] 03/24/2021 Yes    Elevated TSH [R79.89] 03/24/2021 Yes      Problems Resolved During this Admission:       Discharged Condition: stable    Disposition:     Follow Up:    Patient Instructions:      MRI Abdomen W WO Contrast   Standing Status: Future Standing Exp. Date: 07/17/24   Scheduling Instructions: Imaging Center in [place].     Order Specific Question Answer Comments   Does the patient have a pacemaker or a defibrillator (Note: Some facilities may not be able to schedule an MRI for patients with pacemakers and defibrillators. You should contact your local radiology department to determine if this is the case.)? No    Does the patient have an aneurysm or surgical clip, pump, nerve/brain stimulator, middle/inner ear prosthesis, or other metal implant or foreign object (bullet, shrapnel)? If they have a card related to their implant, ask them to bring it. Issues related to the implant may cause the MRI to be delayed. No    Is the patient claustrophobic? No    Will the patient require sedation? No    Does the patient have any of the following conditions? Diabetes, History of Renal Disease or Hypertension requiring medical therapy? Yes    May the Radiologist modify the order per protocol to meet the clinical needs of the patient? Yes    Is this part of a Research Study? No    Does the patient have on a skin patch for medication with aluminized backing? No        Significant Diagnostic Studies: Labs:   CMP   Recent Labs   Lab 07/17/23  1432 07/18/23  0548   * 130*   K 3.5 3.4*    101   CO2 22* 21*    100   BUN 7 7   CREATININE 0.6 0.6   CALCIUM 8.4* 8.4*   PROT 5.8* 5.5*   ALBUMIN 2.3* 2.2*   BILITOT 1.0 1.2*   ALKPHOS 164* 172*   AST 87* 82*   ALT 29 29   ANIONGAP 10 8   , CBC   Recent Labs   Lab 07/17/23  0613  07/18/23  0548   WBC 16.55* 18.76*   HGB 9.4* 9.5*   HCT 28.3* 28.8*    273    and INR   Lab Results   Component Value Date    INR 1.2 07/18/2023    INR 1.3 (H) 07/17/2023    INR 1.3 (H) 07/16/2023     Microbiology:   Urine Culture    Lab Results   Component Value Date    LABURIN ESCHERICHIA COLI  >100,000 cfu/ml   (A) 07/14/2023       Pending Diagnostic Studies:     Procedure Component Value Units Date/Time    Cytology, Fluid/Wash/Brush [[id number]] Collected: 07/15/23 1530    Order Status: Sent Lab Status: In process Updated: 07/17/23 1223    Specimen: Body Fluid          Medications:  Reconciled Home Medications:      Medication List      START taking these medications    cefpodoxime 200 MG tablet  Commonly known as: VANTIN  Take 1 tablet (200 mg total) by mouth every 12 (twelve) hours. for 2 days     folic acid 1 MG tablet  Commonly known as: FOLVITE  Take 1 tablet (1 mg total) by mouth once daily.     furosemide 20 MG tablet  Commonly known as: LASIX  Take 1 tablet (20 mg total) by mouth once daily.     hydrOXYzine HCL 25 MG tablet  Commonly known as: ATARAX  Take 1 tablet (25 mg total) by mouth daily as needed for Anxiety.     magnesium oxide 400 mg (241.3 mg magnesium) tablet  Commonly known as: MAG-OX  Take 1 tablet (400 mg total) by mouth 2 (two) times daily.     potassium chloride 10 MEQ Cpsr  Commonly known as: MICRO-K  Take 1 capsule (10 mEq total) by mouth once daily.     spironolactone 50 MG tablet  Commonly known as: ALDACTONE  Take 1 tablet (50 mg total) by mouth once daily.        CONTINUE taking these medications    bisacodyL 5 mg EC tablet  Commonly known as: DULCOLAX  Take 5 mg by mouth daily as needed for Constipation.     cloNIDine 0.1 MG tablet  Commonly known as: CATAPRES  Take 1 tab by mouth each evening as needed for sleep     dextroamphetamine-amphetamine 30 mg Tab  Take 1 tablet (30 mg total) by mouth Daily.     loratadine 10 mg tablet  Commonly known as: CLARITIN  Take 10 mg by  mouth daily as needed for Allergies.        STOP taking these medications    ibuprofen 200 MG tablet  Commonly known as: ADVIL,MOTRIN        ASK your doctor about these medications    dicyclomine 20 mg tablet  Commonly known as: BENTYL  Take 1 tablet (20 mg total) by mouth 4 (four) times daily as needed (abdominal).            Indwelling Lines/Drains at time of discharge:   Lines/Drains/Airways     None                 Time spent on the discharge of patient: 40 minutes         Vick Walters MD  Department of Hospital Medicine  [facility] - Tele[facility] Step[facility]

## 2023-07-18 NOTE — PLAN OF CARE
[facility] - Telemetry Stepdown  Discharge Final Note    Primary Care Provider: Gogo Ramirez MD    Expected Discharge Date: 7/18/2023    Final Discharge Note (most recent)       Final Note - 07/18/23 1419          Final Note    Assessment Type Final Discharge Note     Anticipated Discharge Disposition Home or Self Care     Hospital Resources/Appts/Education Provided Appointments scheduled by Navigator/Coordinator        Post-Acute Status    Post-Acute Authorization Home Health;Other     Home Health Status --   Unable to obtain at this time due to no insurance coverage    Other Status See Comments   [facility] assisted patient with filing a Medicaid application    Discharge Delays None known at this time                   Future Appointments   Date Time Provider Department Center   7/19/2023  8:00 AM Sanjeev Esquivel MD [facility] HEPAT [facility]   7/24/2023 10:30 AM Gogo Ramirez MD [facility]   8/23/2023  2:00 PM Gogo Ramirez MD [facility]     Nga Guillory, LCSW [facility]  Ext. [phone]

## 2023-07-18 NOTE — DISCHARGE INSTRUCTIONS
Before follow up appointments with hepatology and your primary care provider:    - Avoid anxiety medications, particularly benzodiazepine class (ativan, etc.).  - Do not consume alcohol. This is critical to prevent further damage, but will also be a factor in future liver transplant discussions.   - If taking Tylenol, ensure that 24 hour combined doses do not add up to more that 2000 mg.   - Obtain MRI with and without contrast of abdomen and bring scan information to hepatologist.  - Take new lasix and spironolactone medications together.

## 2023-07-18 NOTE — PROGRESS NOTES
[facility]  Psychology  Consult Note    PROGRESS NOTE - INTERVENTION  Patient Name: Claire Casper  MRN: [id number]  Date: 07/18/2023  Admission Date: 7/14/2023   Length of Stay: Hospital Day: 5   Attending Physician: Stacey Ness MD    HISTORY OF PRESENTING ILLNESS: This is a 57 year old woman with PMH ADHD on Adderall daily, insomnia on clonidine, DJD (degenerative joint disease) of cervical spine, previous breast augmentation, chronic constipation and alcoholism. She presents to the ED with worsening abdominal swelling worsening over last 2 months, with acutely worsened dyspnea and midsternal chest pain in the last few days    BRIEF ASSESSMENT  Mood: Occasional brief periods of low mood that may last 2 hours, never all day. Denied anhedonia and suicidal ideation.  Anxiety: Mild-moderate anxiety related to worries about health, recovery, marital difficulties. Endorsed excessive anxiety, feeling on edge, uncontrollable worry, anxiety-related muscle tension. Denied panic symptoms.  Pain: Rated current pain as 2/10. Denied concerns regarding pain management.  Sleep: Mild impairment involving delayed onset and poor maintenance. Stated that current difficulties are not different than baseline.  Appetite: No concerns.    INTERVENTION  Intervention Type: Motivational Interviewing  Session Content: Engaged patient in brief MI intervention focused on alcohol cessation. Elicited description of health values and goals. Reviewed reasons and abilities regarding goal to quit alcohol. Patient described a strong commitment to alcohol cessation. She noted that her family are aware and supportive. Reviewed benefits of psychotherapy/counseling. Patient agreed to schedule a counseling appointment.     MENTAL STATUS EXAM & OBSERVATIONS  Appearance:  Good grooming and hygiene. Dressed in hospital gown. Appeared stated age.      Orientation: Oriented x 4.   Speech: Normal rate, volume, and prosody.     Thought Processes: Logical and goal-oriented.      Thought Content: Normal. No suicidal or homicidal ideation. No indications of obsessions or delusions.   Mood: Euthymic.   Affect: Full range, congruent with mood and session content..   Attention & Concentration: Intact. No deficits noted.   Insight: Good   Judgment: Good.   Behavioral Observations: Cooperative and engaged. Laying with head of bed elevated. Good eye contact. No signs of psychomotor agitation or retardation.     DIAGNOSTIC IMPRESSION & PLAN  Patient Active Problem List   Diagnosis    ADHD    Insomnia    Screening mammogram, encounter for    Gynecologic exam normal    Need for influenza vaccination    Paresthesias    Cervical radiculopathy    Leg cramps    Exposure to COVID-19 virus    Arthralgia    Hypomagnesemia    Elevated TSH    Thiamin deficiency    Tachycardia    Left-sided chest pain    Transaminitis    History of COVID-19    Ascites    Cholelithiasis without cholecystitis    Cirrhosis of liver with ascites    Hyponatremia    UTI (urinary tract infection)    Leukocytosis    Alcohol use disorder       Impression: This is a 57 year old woman with PMH ADHD, insomnia on clonidine, DJD (degenerative joint disease) of cervical spine, previous breast augmentation, chronic constipation and alcoholism, admitted with Cirrhosis of liver with ascites. Psychology consulted to assess depression and promote coping with stress. Patient endorsed mild-moderate anxiety symptoms. She described a strong commitment to alcohol cessation. Marriage problems are a major source of stress and have been for years. Social support is good. Patient plans to spend 3 weeks with family in [place] following discharge. Overall, patient appears to be coping within normal limits.    Plan: Psychology will continue to follow.    Recommendations  Follow up with established psychiatric NP at [facility] ([facility]).    Follow up with outpatient Psychology or Counseling.      Thank you for  the opportunity to participate in this patient's care.      Length of Service: 23 minutes    Bautista Ngo, Ph.D.  Dept. of Psychiatry  [facility]

## 2023-07-18 NOTE — ASSESSMENT & PLAN NOTE
Patient presenting with ascites and abdominal distension. Likely secondary to cirrhosis secondary to ethanol use given history.   Cirrhosis history: First imaging on CTAP 23  Alcohol history: 30+ years of social drinking, mostly mixed drinks that became heavier over the last decade. Patient stopped drinking 2 months ago.  Tylenol / Medication history: Labs negative for elevated acetaminophen.   CTAP Imagin. Morphologic changes of hepatic cirrhosis and findings of portal hypertension including moderate abdominopelvic ascites and upper abdominal varices including paraesophageal varices.   2. Ill-defined hypodensity in the right hepatic lobe highly concerning for hepatocellular carcinoma.  Further evaluation with nonemergent MRI of the abdomen with and without contrast is recommended.   3. Small left pleural effusion.   4. Cholelithiasis without acute cholecystitis.   5. Mild body wall anasarca.     MELD 3.0: 18 at 2023  5:48 AM  MELD-Na: 9 at 2023  5:48 AM  Calculated from:  Serum Creatinine: 0.6 mg/dL (Using min of 1 mg/dL) at 2023  5:48 AM  Serum Sodium: 130 mmol/L at 2023  5:48 AM  Total Bilirubin: 1.2 mg/dL at 2023  5:48 AM  Serum Albumin: 2.2 g/dL at 2023  5:48 AM  INR(ratio): 1.2 at 2023  5:48 AM  Age at listing (hypothetical): 57 years  Sex: Female at 2023  5:48 AM     -- Lasix 20 BID, Spironolactone 50 daily  -- Viral serolgy negative, ferritin elevated but not extremely high, ceruloplasmin normal, alpha 1 antitrypsin high  -- Paracentesis fluid analysis: Ascites wbc 77, glucose 124, albumin 0.6. SAAG 1.9. Portal hypertension without SBP  -- Trend CMP LFTs / INR daily  -- Daily weight and strict I/Os  -- 2nd Paracentesis with IR on  drained 2.8 L from LLQ  -- Patient to receive outpatient MRI w/without outpatient    Hepatology Consult:  - Would recommend obtaining MRI with/without prior to discharge  - Has outpatient follow up with Dr. Esquivel  scheduled for 7/19  - Would transition to Lasix 20 mg QD and Aldactone 50 mg QD; when able to discharge, would send on this regimen  - Agree with serologic workup initiated by primary team, follow up serologic workup- pending ASMA, AMA and RAMÓN

## 2023-07-19 ENCOUNTER — TELEPHONE (OUTPATIENT)
Dept: PRIMARY CARE CLINIC | Facility: CLINIC | Age: 58
End: 2023-07-19
Payer: COMMERCIAL

## 2023-07-19 ENCOUNTER — TELEPHONE (OUTPATIENT)
Dept: HEPATOLOGY | Facility: CLINIC | Age: 58
End: 2023-07-19

## 2023-07-19 ENCOUNTER — PATIENT MESSAGE (OUTPATIENT)
Dept: ADMINISTRATIVE | Facility: CLINIC | Age: 58
End: 2023-07-19
Payer: COMMERCIAL

## 2023-07-19 ENCOUNTER — PATIENT OUTREACH (OUTPATIENT)
Dept: ADMINISTRATIVE | Facility: CLINIC | Age: 58
End: 2023-07-19
Payer: COMMERCIAL

## 2023-07-19 ENCOUNTER — OFFICE VISIT (OUTPATIENT)
Dept: HEPATOLOGY | Facility: CLINIC | Age: 58
End: 2023-07-19
Payer: COMMERCIAL

## 2023-07-19 DIAGNOSIS — K70.31 ASCITES DUE TO ALCOHOLIC CIRRHOSIS: Primary | ICD-10-CM

## 2023-07-19 DIAGNOSIS — R93.89 ABNORMAL FINDING ON CT SCAN: ICD-10-CM

## 2023-07-19 DIAGNOSIS — R79.89 ELEVATED LFTS: ICD-10-CM

## 2023-07-19 LAB
BACTERIA SPEC AEROBE CULT: NO GROWTH
FINAL PATHOLOGIC DIAGNOSIS: NORMAL
Lab: NORMAL

## 2023-07-19 PROCEDURE — 99215 PR OFFICE/OUTPT VISIT, EST, LEVL V, 40-54 MIN: ICD-10-PCS | Mod: 95,,, | Performed by: INTERNAL MEDICINE

## 2023-07-19 PROCEDURE — 99215 OFFICE O/P EST HI 40 MIN: CPT | Mod: 95,,, | Performed by: INTERNAL MEDICINE

## 2023-07-19 NOTE — PROGRESS NOTES
Subjective:       Patient ID: Claire Casper is a 57 y.o. female.    Chief Complaint: No chief complaint on file.    HPI  I saw this 57 y.o. lady who was discharged from hospital last night.    Admitted with a history of decompensated alcohol related cirrhosis- anasarca and ascites for 2 months.    - Imaging showed ?HCC    CT abdo: 7/14/2023  1. Morphologic changes of hepatic cirrhosis and findings of portal hypertension including moderate abdominopelvic ascites and upper abdominal varices including paraesophageal varices.  2. Ill-defined hypodensity in the right hepatic lobe highly concerning for hepatocellular carcinoma.  Further evaluation with nonemergent MRI of the abdomen with and without contrast is recommended.  3. Small left pleural effusion.  4. Cholelithiasis without acute cholecystitis.  5. Mild body wall anasarca.    AFP 5.3  Peth on 7/14/23= 93    On diuretics= furosemide 20 mg daily + spironoloctanone 50 mg daily  Last para on 7/17/2023      MELD 3.0: 18 at 7/18/2023  5:48 AM  MELD-Na: 9 at 7/18/2023  5:48 AM  Calculated from:  Serum Creatinine: 0.6 mg/dL (Using min of 1 mg/dL) at 7/18/2023  5:48 AM  Serum Sodium: 130 mmol/L at 7/18/2023  5:48 AM  Total Bilirubin: 1.2 mg/dL at 7/18/2023  5:48 AM  Serum Albumin: 2.2 g/dL at 7/18/2023  5:48 AM  INR(ratio): 1.2 at 7/18/2023  5:48 AM  Age at listing (hypothetical): 57 years  Sex: Female at 7/18/2023  5:48 AM      PMH:  ADHD  Alcohol related liver disease      Review of Systems   Constitutional:  Negative for activity change, appetite change, chills, fatigue, fever and unexpected weight change.   HENT:  Negative for ear pain, hearing loss, nosebleeds, sore throat and trouble swallowing.    Eyes:  Negative for redness and visual disturbance.   Respiratory:  Negative for cough, chest tightness, shortness of breath and wheezing.    Cardiovascular:  Negative for chest pain and palpitations.   Gastrointestinal:  Negative for abdominal distention,  abdominal pain, blood in stool, constipation, diarrhea, nausea and vomiting.   Genitourinary:  Negative for difficulty urinating, dysuria, frequency, hematuria and urgency.   Musculoskeletal:  Negative for arthralgias, back pain, gait problem, joint swelling and myalgias.   Skin:  Negative for rash.   Neurological:  Negative for tremors, seizures, speech difficulty, weakness and headaches.   Hematological:  Negative for adenopathy.   Psychiatric/Behavioral:  Negative for confusion, decreased concentration and sleep disturbance. The patient is not nervous/anxious.          Lab Results   Component Value Date    ALT 29 07/18/2023    AST 82 (H) 07/18/2023    ALKPHOS 172 (H) 07/18/2023    BILITOT 1.2 (H) 07/18/2023     Past Medical History:   Diagnosis Date    ADHD 10/5/2020    COVID-19 virus infection     DJD (degenerative joint disease) of cervical spine     Hx of psychiatric care     Sleep difficulties     Therapy      Past Surgical History:   Procedure Laterality Date    AUGMENTATION OF BREAST      SINUS SURGERY       Current Outpatient Medications   Medication Sig    bisacodyL (DULCOLAX) 5 mg EC tablet Take 5 mg by mouth daily as needed for Constipation.    cefpodoxime (VANTIN) 200 MG tablet Take 1 tablet (200 mg total) by mouth every 12 (twelve) hours. for 2 days    cloNIDine (CATAPRES) 0.1 MG tablet Take 1 tab by mouth each evening as needed for sleep    dextroamphetamine-amphetamine 30 mg Tab Take 1 tablet (30 mg total) by mouth Daily.    dicyclomine (BENTYL) 20 mg tablet Take 1 tablet (20 mg total) by mouth 4 (four) times daily as needed (abdominal). (Patient not taking: Reported on 7/15/2023)    folic acid (FOLVITE) 1 MG tablet Take 1 tablet (1 mg total) by mouth once daily.    furosemide (LASIX) 20 MG tablet Take 1 tablet (20 mg total) by mouth once daily.    hydrOXYzine HCL (ATARAX) 25 MG tablet Take 1 tablet (25 mg total) by mouth daily as needed for Anxiety.    loratadine (CLARITIN) 10 mg tablet Take 10 mg  by mouth daily as needed for Allergies.    magnesium oxide (MAG-OX) 400 mg (241.3 mg magnesium) tablet Take 1 tablet (400 mg total) by mouth 2 (two) times daily.    potassium chloride (MICRO-K) 10 MEQ CpSR Take 1 capsule (10 mEq total) by mouth once daily.    spironolactone (ALDACTONE) 50 MG tablet Take 1 tablet (50 mg total) by mouth once daily.     No current facility-administered medications for this visit.       Objective:      Physical Exam    NOT DONE-VIDEO VISIT  Assessment:       1. Elevated LFTs    2. Abnormal finding on CT scan        Plan:   She has decompensated cirrhosis due to alcohol and tells me that she stopped drinking completely in May 2023. However, her recent Peth test was positive.    She is on very low dose diuretics and I am not sure how helpful these doses will be for her fluid retention. These doses are limited by her hyponatremia.    She needs close observation and management for her flui retention and further investigation by MRI for her liver mass.    She plans on leaving for [place] this weekend and would like to have a paracentesis and her MRI there. In addition, I would like her to get weekly labs starting on 7/24/23    She met with her psychologist yesterday and claims that she is now committed to lifelong abstinence.    Clinic in 4 weeks

## 2023-07-19 NOTE — TELEPHONE ENCOUNTER
----- Message from Sanjeev Esquivel MD sent at 7/19/2023  9:09 AM CDT -----  Can you set up labs once a week in [facility]  Clinic in 4 weeks- virtual is okay- Aug 17 at 2 pm

## 2023-07-19 NOTE — PROGRESS NOTES
C3 nurse spoke with Claire Casper for a TCC post hospital discharge follow up call. The patient has a scheduled HOSFU appointment with Gogo Ramirez MD on 7/24/23 @ 1030.

## 2023-07-19 NOTE — TELEPHONE ENCOUNTER
Patient completed a Virtual Visit with Dr Esquivel this am. New Orders to follow.    The patient will need Weekly Paracentesis starting on Mon 7/24/23 in [place].      Call placed to Matt with [facility] [phone] for scheduling.  Information relayed from Dr Esquivel.  Matt stated 5L will be the max removed.   notified and agreed.    Matt will reach out to the patient to arrange the appointment.

## 2023-07-19 NOTE — TELEPHONE ENCOUNTER
I sw pt.  typically prescribed the Clonidine. She will contact the pharmacy to see if they will transfer the rx. If not she will contact 's office to send to [facility]. Pt was informed that stool softeners are OTC and will [place] when she picks up her prescription.

## 2023-07-19 NOTE — TELEPHONE ENCOUNTER
Pt was informed and expressed understanding. She cancelled the appt. She has an appt already scheduled in Aug and will keep that appt for now

## 2023-07-19 NOTE — TELEPHONE ENCOUNTER
Received TCC call from Rn following patients discharge requesting Clonidine be sent to [facility] instead of [facility] and a rx for stool softener.      I sw pt.  typically prescribed the Clonidine. She will contact the pharmacy to see if they will transfer the rx. If not she will contact 's office to send to [facility]. Pt was informed that stool softeners are OTC and will [place] when she picks up her prescription.     Dr.G- rader has a hosp f/u with you on 7/24 but states she will be in [place] for a couple months. She would like to know if you can do her hosp f/u virtually. Note: pt has not seen you since 3/24/21

## 2023-07-19 NOTE — Clinical Note
Can you set up labs once a week in [place] please Clinic in 4 weeks- virtual is okay- Aug 17 at 2 pm

## 2023-07-19 NOTE — PROGRESS NOTES
C3 nurse attempted to contact Claire Casper for a TCC post hospital discharge follow up call. No answer. Left voicemail with callback information. The patient has a scheduled HOSFU appointment with Gogo Ramirez MD on 7/24/23 @ 3950.

## 2023-07-19 NOTE — TELEPHONE ENCOUNTER
Spoke w pt she stated she will set up labs for the first two weeks at [facility] and give a call back to set up the rest     Can you set up labs once a week in [place] please

## 2023-07-20 ENCOUNTER — PATIENT MESSAGE (OUTPATIENT)
Dept: PSYCHIATRY | Facility: CLINIC | Age: 58
End: 2023-07-20
Payer: COMMERCIAL

## 2023-07-20 DIAGNOSIS — F90.9 ATTENTION DEFICIT HYPERACTIVITY DISORDER (ADHD), UNSPECIFIED ADHD TYPE: ICD-10-CM

## 2023-07-20 LAB — BACTERIA SPEC AEROBE CULT: NO GROWTH

## 2023-07-21 RX ORDER — DEXTROAMPHETAMINE SACCHARATE, AMPHETAMINE ASPARTATE, DEXTROAMPHETAMINE SULFATE AND AMPHETAMINE SULFATE 7.5; 7.5; 7.5; 7.5 MG/1; MG/1; MG/1; MG/1
30 TABLET ORAL DAILY
Qty: 30 TABLET | Refills: 0 | Status: SHIPPED | OUTPATIENT
Start: 2023-07-21 | End: 2023-08-24

## 2023-07-24 ENCOUNTER — HOSPITAL ENCOUNTER (OUTPATIENT)
Dept: RADIOLOGY | Facility: HOSPITAL | Age: 58
Discharge: HOME | End: 2023-07-24
Attending: INTERNAL MEDICINE
Payer: COMMERCIAL

## 2023-07-24 DIAGNOSIS — K70.31 ASCITES DUE TO ALCOHOLIC CIRRHOSIS: ICD-10-CM

## 2023-07-24 LAB
BACTERIA SPEC ANAEROBE CULT: NORMAL
BACTERIA SPEC ANAEROBE CULT: NORMAL

## 2023-07-24 PROCEDURE — 49083 ABD PARACENTESIS W/IMAGING: CPT

## 2023-07-24 RX ORDER — LIDOCAINE HYDROCHLORIDE 20 MG/ML
INJECTION, SOLUTION INFILTRATION; PERINEURAL
Status: DISPENSED
Start: 2023-07-24 | End: 2023-07-24

## 2023-07-26 ENCOUNTER — PATIENT MESSAGE (OUTPATIENT)
Dept: HEPATOLOGY | Facility: CLINIC | Age: 58
End: 2023-07-26
Payer: COMMERCIAL

## 2023-07-26 ENCOUNTER — TELEPHONE (OUTPATIENT)
Dept: HEPATOLOGY | Facility: CLINIC | Age: 58
End: 2023-07-26
Payer: COMMERCIAL

## 2023-07-26 ENCOUNTER — LAB VISIT (OUTPATIENT)
Dept: LAB | Facility: HOSPITAL | Age: 58
End: 2023-07-26
Attending: INTERNAL MEDICINE
Payer: COMMERCIAL

## 2023-07-26 DIAGNOSIS — K76.9 LIVER DISEASE, UNSPECIFIED: Primary | ICD-10-CM

## 2023-07-26 DIAGNOSIS — K70.31 ASCITES DUE TO ALCOHOLIC CIRRHOSIS: ICD-10-CM

## 2023-07-26 DIAGNOSIS — R93.89 ABNORMAL FINDING ON CT SCAN: ICD-10-CM

## 2023-07-26 LAB
ALBUMIN SERPL-MCNC: 2.7 G/DL (ref 3.5–5)
ALBUMIN/GLOB SERPL: 0.8 RATIO (ref 1.1–2)
ALP SERPL-CCNC: 140 UNIT/L (ref 40–150)
ALT SERPL-CCNC: 40 UNIT/L (ref 0–55)
AST SERPL-CCNC: 105 UNIT/L (ref 5–34)
BILIRUBIN DIRECT+TOT PNL SERPL-MCNC: 2 MG/DL
BUN SERPL-MCNC: 9.3 MG/DL (ref 9.8–20.1)
CALCIUM SERPL-MCNC: 9.1 MG/DL (ref 8.4–10.2)
CHLORIDE SERPL-SCNC: 99 MMOL/L (ref 98–107)
CO2 SERPL-SCNC: 22 MMOL/L (ref 22–29)
CREAT SERPL-MCNC: 0.82 MG/DL (ref 0.55–1.02)
ERYTHROCYTE [DISTWIDTH] IN BLOOD BY AUTOMATED COUNT: 15.3 % (ref 11.5–17)
GFR SERPLBLD CREATININE-BSD FMLA CKD-EPI: >60 MLS/MIN/1.73/M2
GLOBULIN SER-MCNC: 3.6 GM/DL (ref 2.4–3.5)
GLUCOSE SERPL-MCNC: 105 MG/DL (ref 74–100)
HCT VFR BLD AUTO: 30.3 % (ref 37–47)
HGB BLD-MCNC: 10.4 G/DL (ref 12–16)
INR PPP: 1.3
MCH RBC QN AUTO: 35.6 PG (ref 27–31)
MCHC RBC AUTO-ENTMCNC: 34.3 G/DL (ref 33–36)
MCV RBC AUTO: 103.8 FL (ref 80–94)
NRBC BLD AUTO-RTO: 0 %
PLATELET # BLD AUTO: 383 X10(3)/MCL (ref 130–400)
PMV BLD AUTO: 9.4 FL (ref 7.4–10.4)
POTASSIUM SERPL-SCNC: 4 MMOL/L (ref 3.5–5.1)
PROT SERPL-MCNC: 6.3 GM/DL (ref 6.4–8.3)
PROTHROMBIN TIME: 15.7 SECONDS (ref 12.5–14.5)
RBC # BLD AUTO: 2.92 X10(6)/MCL (ref 4.2–5.4)
SODIUM SERPL-SCNC: 132 MMOL/L (ref 136–145)
WBC # SPEC AUTO: 16.52 X10(3)/MCL (ref 4.5–11.5)

## 2023-07-26 PROCEDURE — 85610 PROTHROMBIN TIME: CPT

## 2023-07-26 PROCEDURE — 36415 COLL VENOUS BLD VENIPUNCTURE: CPT

## 2023-07-26 PROCEDURE — 80053 COMPREHEN METABOLIC PANEL: CPT

## 2023-07-26 PROCEDURE — 85027 COMPLETE CBC AUTOMATED: CPT

## 2023-07-28 ENCOUNTER — TELEPHONE (OUTPATIENT)
Dept: HEPATOLOGY | Facility: CLINIC | Age: 58
End: 2023-07-28
Payer: COMMERCIAL

## 2023-07-28 NOTE — TELEPHONE ENCOUNTER
----- Message from Alexandrea Suárez sent at 7/28/2023  3:35 PM CDT -----  Regarding: Speak to staff/ lab results  Contact: Claire  Regarding: Speak to staff        Name Of Caller: Claire        Contact Preference: [phone] (home)              Nature of call:  pt is calling to speak to staff regarding scheduling another drain mon or tues, not sure if she could. Also would like test results that were taking on07/26/2023. Please advise. Requesting a call back

## 2023-07-28 NOTE — TELEPHONE ENCOUNTER
----- Message from Alexandrea Suárez sent at 7/28/2023  3:35 PM CDT -----  Regarding: Speak to staff/ lab results  Contact: Claire  Regarding: Speak to staff        Name Of Caller: Claire        Contact Preference: [phone] (home)              Nature of call:  pt is calling to speak to staff regarding scheduling another drain mon or tues, not sure if she could. Also would like test results that were taking on07/26/2023. Please advise. Requesting a call back

## 2023-07-31 ENCOUNTER — TELEPHONE (OUTPATIENT)
Dept: HEPATOLOGY | Facility: CLINIC | Age: 58
End: 2023-07-31
Payer: COMMERCIAL

## 2023-07-31 NOTE — TELEPHONE ENCOUNTER
Spoke with IR department at [facility].  They will call patient to set up appt for paracentesis.

## 2023-07-31 NOTE — TELEPHONE ENCOUNTER
----- Message from Rosie Langston MA sent at 7/31/2023  3:38 PM CDT -----  Regarding: FW: Patient advice  Contact: Pt [phone]    ----- Message -----  From: Yuila Howe  Sent: 7/31/2023  10:07 AM CDT  To: [facility] Staff  Subject: Patient advice                                         Name of Caller:  Claire Rios     Contact Preference:   [phone]    Nature of Call: Patient states she has done everything suggested and nothing is helping with the pain and burning feelings in hand states it is getting worst would like to discuss other options

## 2023-08-01 ENCOUNTER — HOSPITAL ENCOUNTER (OUTPATIENT)
Dept: RADIOLOGY | Facility: HOSPITAL | Age: 58
Discharge: HOME | End: 2023-08-01
Attending: INTERNAL MEDICINE
Payer: COMMERCIAL

## 2023-08-01 ENCOUNTER — TELEPHONE (OUTPATIENT)
Dept: HEPATOLOGY | Facility: CLINIC | Age: 58
End: 2023-08-01
Payer: COMMERCIAL

## 2023-08-01 DIAGNOSIS — K70.31 ASCITES DUE TO ALCOHOLIC CIRRHOSIS: ICD-10-CM

## 2023-08-01 PROCEDURE — 49083 ABD PARACENTESIS W/IMAGING: CPT

## 2023-08-01 NOTE — TELEPHONE ENCOUNTER
----- Message from Lata James MA sent at 8/1/2023  9:53 AM CDT -----  Pls return patient's call. She said that she is looking for a referral from Dr. Esquivel to a pain doctor. Pt can be reached at [phone]

## 2023-08-01 NOTE — TELEPHONE ENCOUNTER
Patient would like a pain management referral for hand pain.  Patient instructed to call family MD.  She agreed .

## 2023-08-02 ENCOUNTER — LAB VISIT (OUTPATIENT)
Dept: LAB | Facility: HOSPITAL | Age: 58
End: 2023-08-02
Attending: INTERNAL MEDICINE
Payer: COMMERCIAL

## 2023-08-02 DIAGNOSIS — K70.31 ASCITES DUE TO ALCOHOLIC CIRRHOSIS: ICD-10-CM

## 2023-08-02 LAB
ALBUMIN SERPL-MCNC: 2.5 G/DL (ref 3.5–5)
ALBUMIN/GLOB SERPL: 0.7 RATIO (ref 1.1–2)
ALP SERPL-CCNC: 158 UNIT/L (ref 40–150)
ALT SERPL-CCNC: 32 UNIT/L (ref 0–55)
AST SERPL-CCNC: 81 UNIT/L (ref 5–34)
BILIRUBIN DIRECT+TOT PNL SERPL-MCNC: 0.9 MG/DL
BUN SERPL-MCNC: 15.8 MG/DL (ref 9.8–20.1)
CALCIUM SERPL-MCNC: 9.1 MG/DL (ref 8.4–10.2)
CHLORIDE SERPL-SCNC: 100 MMOL/L (ref 98–107)
CO2 SERPL-SCNC: 24 MMOL/L (ref 22–29)
CREAT SERPL-MCNC: 0.65 MG/DL (ref 0.55–1.02)
ERYTHROCYTE [DISTWIDTH] IN BLOOD BY AUTOMATED COUNT: 14.2 % (ref 11.5–17)
GFR SERPLBLD CREATININE-BSD FMLA CKD-EPI: >60 MLS/MIN/1.73/M2
GLOBULIN SER-MCNC: 3.5 GM/DL (ref 2.4–3.5)
GLUCOSE SERPL-MCNC: 101 MG/DL (ref 74–100)
HCT VFR BLD AUTO: 28.4 % (ref 37–47)
HGB BLD-MCNC: 10 G/DL (ref 12–16)
INR PPP: 1.2
MCH RBC QN AUTO: 34.5 PG (ref 27–31)
MCHC RBC AUTO-ENTMCNC: 35.2 G/DL (ref 33–36)
MCV RBC AUTO: 97.9 FL (ref 80–94)
NRBC BLD AUTO-RTO: 0 %
PLATELET # BLD AUTO: 328 X10(3)/MCL (ref 130–400)
PMV BLD AUTO: 9.5 FL (ref 7.4–10.4)
POTASSIUM SERPL-SCNC: 4.6 MMOL/L (ref 3.5–5.1)
PROT SERPL-MCNC: 6 GM/DL (ref 6.4–8.3)
PROTHROMBIN TIME: 14.9 SECONDS (ref 12.5–14.5)
RBC # BLD AUTO: 2.9 X10(6)/MCL (ref 4.2–5.4)
SODIUM SERPL-SCNC: 130 MMOL/L (ref 136–145)
WBC # SPEC AUTO: 18.19 X10(3)/MCL (ref 4.5–11.5)

## 2023-08-02 PROCEDURE — 85610 PROTHROMBIN TIME: CPT

## 2023-08-02 PROCEDURE — 85027 COMPLETE CBC AUTOMATED: CPT

## 2023-08-02 PROCEDURE — 36415 COLL VENOUS BLD VENIPUNCTURE: CPT

## 2023-08-02 PROCEDURE — 80053 COMPREHEN METABOLIC PANEL: CPT

## 2023-08-07 ENCOUNTER — HOSPITAL ENCOUNTER (OUTPATIENT)
Dept: RADIOLOGY | Facility: HOSPITAL | Age: 58
Discharge: HOME | End: 2023-08-07
Attending: INTERNAL MEDICINE
Payer: MEDICARE

## 2023-08-07 DIAGNOSIS — R93.89 ABNORMAL FINDING ON CT SCAN: ICD-10-CM

## 2023-08-07 DIAGNOSIS — K76.9 LIVER DISEASE, UNSPECIFIED: ICD-10-CM

## 2023-08-07 DIAGNOSIS — K70.31 ASCITES DUE TO ALCOHOLIC CIRRHOSIS: ICD-10-CM

## 2023-08-07 PROCEDURE — 25500020 PHARM REV CODE 255: Performed by: INTERNAL MEDICINE

## 2023-08-07 PROCEDURE — A9577 INJ MULTIHANCE: HCPCS | Performed by: INTERNAL MEDICINE

## 2023-08-07 PROCEDURE — 74183 MRI ABD W/O CNTR FLWD CNTR: CPT | Mod: TC

## 2023-08-07 RX ADMIN — GADOBENATE DIMEGLUMINE 10 ML: 529 INJECTION, SOLUTION INTRAVENOUS at 12:08

## 2023-08-08 ENCOUNTER — TELEPHONE (OUTPATIENT)
Dept: HEPATOLOGY | Facility: CLINIC | Age: 58
End: 2023-08-08
Payer: COMMERCIAL

## 2023-08-08 ENCOUNTER — HOSPITAL ENCOUNTER (OUTPATIENT)
Dept: RADIOLOGY | Facility: HOSPITAL | Age: 58
Discharge: HOME | End: 2023-08-08
Attending: INTERNAL MEDICINE
Payer: COMMERCIAL

## 2023-08-08 DIAGNOSIS — K70.31 ASCITES DUE TO ALCOHOLIC CIRRHOSIS: ICD-10-CM

## 2023-08-08 PROCEDURE — 49083 ABD PARACENTESIS W/IMAGING: CPT

## 2023-08-08 RX ORDER — LIDOCAINE HYDROCHLORIDE 10 MG/ML
INJECTION INFILTRATION; PERINEURAL
Status: DISCONTINUED
Start: 2023-08-08 | End: 2023-08-09 | Stop reason: HOSPADM

## 2023-08-08 NOTE — TELEPHONE ENCOUNTER
----- Message from Farooq Chen sent at 8/8/2023  2:52 PM CDT -----  Regarding: Consult/Advisory  Contact: Claire Casper  Consult/Advisory    Name Of Caller: Claire Casper      Contact Preference: [phone] (home)      Nature of call: Patient calling to speak to staff regarding a getting scheduled for labs this week. Patient states she can go tomorrow morning or Friday to draw labs. Patient has MRI done on Monday and paracentesis today.

## 2023-08-09 DIAGNOSIS — Z12.31 OTHER SCREENING MAMMOGRAM: ICD-10-CM

## 2023-08-10 ENCOUNTER — TELEPHONE (OUTPATIENT)
Dept: PRIMARY CARE CLINIC | Facility: CLINIC | Age: 58
End: 2023-08-10

## 2023-08-10 ENCOUNTER — OFFICE VISIT (OUTPATIENT)
Dept: PRIMARY CARE CLINIC | Facility: CLINIC | Age: 58
End: 2023-08-10
Payer: COMMERCIAL

## 2023-08-10 VITALS
OXYGEN SATURATION: 97 % | DIASTOLIC BLOOD PRESSURE: 68 MMHG | SYSTOLIC BLOOD PRESSURE: 112 MMHG | WEIGHT: 121 LBS | BODY MASS INDEX: 20.66 KG/M2 | HEART RATE: 115 BPM | RESPIRATION RATE: 18 BRPM | HEIGHT: 64 IN | TEMPERATURE: 98 F

## 2023-08-10 DIAGNOSIS — K70.31 ASCITES DUE TO ALCOHOLIC CIRRHOSIS: ICD-10-CM

## 2023-08-10 DIAGNOSIS — K70.31 ALCOHOLIC CIRRHOSIS OF LIVER WITH ASCITES: ICD-10-CM

## 2023-08-10 DIAGNOSIS — G62.1 ALCOHOLIC PERIPHERAL NEUROPATHY: Primary | ICD-10-CM

## 2023-08-10 PROBLEM — F41.9 ANXIETY: Status: ACTIVE | Noted: 2018-11-05

## 2023-08-10 PROBLEM — J31.0 CHRONIC RHINITIS: Status: ACTIVE | Noted: 2018-11-21

## 2023-08-10 PROBLEM — R04.0 EPISTAXIS: Status: ACTIVE | Noted: 2018-11-21

## 2023-08-10 PROCEDURE — 1160F RVW MEDS BY RX/DR IN RCRD: CPT | Mod: CPTII,,, | Performed by: STUDENT IN AN ORGANIZED HEALTH CARE EDUCATION/TRAINING PROGRAM

## 2023-08-10 PROCEDURE — 3008F BODY MASS INDEX DOCD: CPT | Mod: CPTII,,, | Performed by: STUDENT IN AN ORGANIZED HEALTH CARE EDUCATION/TRAINING PROGRAM

## 2023-08-10 PROCEDURE — 3078F PR MOST RECENT DIASTOLIC BLOOD PRESSURE < 80 MM HG: ICD-10-PCS | Mod: CPTII,,, | Performed by: STUDENT IN AN ORGANIZED HEALTH CARE EDUCATION/TRAINING PROGRAM

## 2023-08-10 PROCEDURE — 99204 PR OFFICE/OUTPT VISIT, NEW, LEVL IV, 45-59 MIN: ICD-10-PCS | Mod: ,,, | Performed by: STUDENT IN AN ORGANIZED HEALTH CARE EDUCATION/TRAINING PROGRAM

## 2023-08-10 PROCEDURE — 1111F DSCHRG MED/CURRENT MED MERGE: CPT | Mod: CPTII,,, | Performed by: STUDENT IN AN ORGANIZED HEALTH CARE EDUCATION/TRAINING PROGRAM

## 2023-08-10 PROCEDURE — 3008F PR BODY MASS INDEX (BMI) DOCUMENTED: ICD-10-PCS | Mod: CPTII,,, | Performed by: STUDENT IN AN ORGANIZED HEALTH CARE EDUCATION/TRAINING PROGRAM

## 2023-08-10 PROCEDURE — 1160F PR REVIEW ALL MEDS BY PRESCRIBER/CLIN PHARMACIST DOCUMENTED: ICD-10-PCS | Mod: CPTII,,, | Performed by: STUDENT IN AN ORGANIZED HEALTH CARE EDUCATION/TRAINING PROGRAM

## 2023-08-10 PROCEDURE — 1159F MED LIST DOCD IN RCRD: CPT | Mod: CPTII,,, | Performed by: STUDENT IN AN ORGANIZED HEALTH CARE EDUCATION/TRAINING PROGRAM

## 2023-08-10 PROCEDURE — 3078F DIAST BP <80 MM HG: CPT | Mod: CPTII,,, | Performed by: STUDENT IN AN ORGANIZED HEALTH CARE EDUCATION/TRAINING PROGRAM

## 2023-08-10 PROCEDURE — 1111F PR DISCHARGE MEDS RECONCILED W/ CURRENT OUTPATIENT MED LIST: ICD-10-PCS | Mod: CPTII,,, | Performed by: STUDENT IN AN ORGANIZED HEALTH CARE EDUCATION/TRAINING PROGRAM

## 2023-08-10 PROCEDURE — 3074F PR MOST RECENT SYSTOLIC BLOOD PRESSURE < 130 MM HG: ICD-10-PCS | Mod: CPTII,,, | Performed by: STUDENT IN AN ORGANIZED HEALTH CARE EDUCATION/TRAINING PROGRAM

## 2023-08-10 PROCEDURE — 99204 OFFICE O/P NEW MOD 45 MIN: CPT | Mod: ,,, | Performed by: STUDENT IN AN ORGANIZED HEALTH CARE EDUCATION/TRAINING PROGRAM

## 2023-08-10 PROCEDURE — 3074F SYST BP LT 130 MM HG: CPT | Mod: CPTII,,, | Performed by: STUDENT IN AN ORGANIZED HEALTH CARE EDUCATION/TRAINING PROGRAM

## 2023-08-10 PROCEDURE — 1159F PR MEDICATION LIST DOCUMENTED IN MEDICAL RECORD: ICD-10-PCS | Mod: CPTII,,, | Performed by: STUDENT IN AN ORGANIZED HEALTH CARE EDUCATION/TRAINING PROGRAM

## 2023-08-10 RX ORDER — GABAPENTIN 600 MG/1
600 TABLET ORAL 3 TIMES DAILY
Qty: 90 TABLET | Refills: 0 | Status: SHIPPED | OUTPATIENT
Start: 2023-08-10 | End: 2023-09-07

## 2023-08-10 RX ORDER — TRAMADOL HYDROCHLORIDE 50 MG/1
50 TABLET ORAL
Qty: 30 TABLET | Refills: 0 | Status: SHIPPED | OUTPATIENT
Start: 2023-08-10 | End: 2023-09-11 | Stop reason: SDUPTHER

## 2023-08-10 RX ORDER — GABAPENTIN 300 MG/1
CAPSULE ORAL
COMMUNITY
Start: 2023-08-01 | End: 2023-08-10

## 2023-08-10 NOTE — PROGRESS NOTES
Chief Complaint  Chief Complaint   Patient presents with    Establish Care    Hand Pain     Hand        HPI  Claire Casper is a 57 y.o. female with medical diagnoses as listed in the medical history who presents with her sister to clinic to establish care.   Histories reviewed.   - Patient states that she used to be a heavy alcohol drinker due to bad choices in a bad relationship and was recently diagnosed with alcoholic cirrhosis since 07/2023. Since then she has had several paracentesis done and currently is on a regular schedule now. She was having blood work done weekly per liver specialist till last week. Next follow up is 8./17/2023   - Her main complaint today is significant neuropathic pain of both hands and arms. This has happened for about 6 weeks and pain is worsening now. She is now unable to make a fist or hold any object in hands. Unable to care for herself leading to the move back to [place] to be near her sister and mother. She was diagnosed with neuropathy and prescribed gabapentin 300mg three times daily with minimal relief. She also takes 1000mg Tylenol at times before bed to get some relief. She describes the pain as burning like putting over hot stove; it is tender to light touch. Also reports both lower extremity numbness from the calves down to her feet. Patient was taking vit B12 prior to the move but stopped since. Denies neck or back pain at this time.      Health Maintenance         Date Due Completion Date    Pneumococcal Vaccines (Age 0-64) (1 - PCV) Never done ---    TETANUS VACCINE Never done ---    Colorectal Cancer Screening Never done ---    Shingles Vaccine (1 of 2) Never done ---    Mammogram 12/07/2021 12/7/2020    COVID-19 Vaccine (4 - Pfizer series) 02/16/2022 12/22/2021    Influenza Vaccine (1) 09/01/2023 10/28/2022    Cervical Cancer Screening 12/07/2025 12/7/2020    Lipid Panel 02/11/2026 2/11/2021            ALLERGIES AND MEDICATIONS: updated and  reviewed.  Review of patient's allergies indicates:   Allergen Reactions    Hydrocodone-acetaminophen Nausea And Vomiting and Other (See Comments)     No reaction noted in other system       Current Outpatient Medications   Medication Sig Dispense Refill    bisacodyL (DULCOLAX) 5 mg EC tablet Take 5 mg by mouth daily as needed for Constipation.      cloNIDine (CATAPRES) 0.1 MG tablet Take 1 tab by mouth each evening as needed for sleep 90 tablet 3    dextroamphetamine-amphetamine 30 mg Tab Take 1 tablet (30 mg total) by mouth Daily. 30 tablet 0    folic acid (FOLVITE) 1 MG tablet Take 1 tablet (1 mg total) by mouth once daily. 90 tablet 3    furosemide (LASIX) 20 MG tablet Take 1 tablet (20 mg total) by mouth once daily. 90 tablet 3    hydrOXYzine HCL (ATARAX) 25 MG tablet Take 1 tablet (25 mg total) by mouth daily as needed for Anxiety. 30 tablet 0    magnesium oxide (MAG-OX) 400 mg (241.3 mg magnesium) tablet Take 1 tablet (400 mg total) by mouth 2 (two) times daily. 60 tablet 0    gabapentin (NEURONTIN) 600 MG tablet Take 1 tablet (600 mg total) by mouth 3 (three) times daily. 90 tablet 0    loratadine (CLARITIN) 10 mg tablet Take 10 mg by mouth daily as needed for Allergies.      potassium chloride (MICRO-K) 10 MEQ CpSR Take 1 capsule (10 mEq total) by mouth once daily. 30 capsule 0    spironolactone (ALDACTONE) 50 MG tablet Take 1 tablet (50 mg total) by mouth once daily. 90 tablet 3    traMADoL (ULTRAM) 50 mg tablet Take 1 tablet (50 mg total) by mouth every 24 hours as needed for Pain. 30 tablet 0     No current facility-administered medications for this visit.       Histories are reviewed and updated as appropriate     Review of Systems  Comprehensive review of system performed- negative except noted in HPI       Objective:   Vitals:    08/10/23 0823   BP: 112/68   BP Location: Left arm   Patient Position: Sitting   Pulse: (!) 115   Resp: 18   Temp: 98.3 °F (36.8 °C)   TempSrc: Oral   SpO2: 97%   Weight:  "54.9 kg (121 lb)   Height: 5' 4" (1.626 m)    Body mass index is 20.77 kg/m².  Physical Exam  Vitals and nursing note reviewed.   Constitutional:       Appearance: Normal appearance. She is ill-appearing.   HENT:      Head: Normocephalic and atraumatic.      Mouth/Throat:      Mouth: Mucous membranes are moist.      Pharynx: Oropharynx is clear.   Eyes:      Extraocular Movements: Extraocular movements intact.      Conjunctiva/sclera: Conjunctivae normal.   Cardiovascular:      Rate and Rhythm: Regular rhythm. Tachycardia present.      Pulses: Normal pulses.      Heart sounds: Normal heart sounds. No murmur heard.     No friction rub.   Pulmonary:      Effort: Pulmonary effort is normal. No respiratory distress.      Breath sounds: Normal breath sounds. No wheezing or rales.   Abdominal:      General: There is distension.      Palpations: Abdomen is soft.      Tenderness: There is no abdominal tenderness.   Musculoskeletal:         General: No swelling (+1 non pitting edema of LEs). Normal range of motion.      Cervical back: Normal range of motion. No rigidity.      Comments: Both hands are retracted and unable to make a fist due to pain. No significant deformities.    Skin:     General: Skin is warm and dry.      Capillary Refill: Capillary refill takes less than 2 seconds.   Neurological:      General: No focal deficit present.      Mental Status: She is alert and oriented to person, place, and time. Mental status is at baseline.      Cranial Nerves: No cranial nerve deficit.      Gait: Gait abnormal (unsteady).   Psychiatric:         Mood and Affect: Mood normal.         Behavior: Behavior normal.         Thought Content: Thought content normal.         Judgment: Judgment normal.           Assessment & Plan  1. Alcoholic peripheral neuropathy  -     traMADoL (ULTRAM) 50 mg tablet; Take 1 tablet (50 mg total) by mouth every 24 hours as needed for Pain.  Dispense: 30 tablet; Refill: 0  -     gabapentin (NEURONTIN) " 600 MG tablet; Take 1 tablet (600 mg total) by mouth 3 (three) times daily.  Dispense: 90 tablet; Refill: 0  -     Ambulatory referral/consult to Neurology; Future; Expected date: 08/17/2023        -     Advise patient to resume B complex vitamin. Side effects discussed with patient.         -    Patient requests referral to neurology- I have explained to patient that this is likely due to complication of alcohol abuse.     2. Alcoholic cirrhosis of liver with ascites  Overview:  Patient follows a liver specialist in [facility] appt 8/17/2023  Avoid Tylenol as much as possible due to cirrhosis   Avoid NSAID due to concerning for esophageal varicoses, etc     3. Ascites due to alcoholic cirrhosis  Overview:  Patient is on a schedule for weekly paracentesis with [facility]   Patient is afebrile today with no complaining of abdominal pain.     Follow up in about 4 weeks (around 9/7/2023) for Chronic conditions.

## 2023-08-11 ENCOUNTER — NURSE TRIAGE (OUTPATIENT)
Dept: ADMINISTRATIVE | Facility: CLINIC | Age: 58
End: 2023-08-11
Payer: COMMERCIAL

## 2023-08-11 PROBLEM — F10.90 ALCOHOL USE DISORDER: Status: RESOLVED | Noted: 2023-07-18 | Resolved: 2023-08-11

## 2023-08-11 PROBLEM — N39.0 UTI (URINARY TRACT INFECTION): Status: RESOLVED | Noted: 2023-07-14 | Resolved: 2023-08-11

## 2023-08-11 PROBLEM — R07.9 LEFT-SIDED CHEST PAIN: Status: RESOLVED | Noted: 2021-03-24 | Resolved: 2023-08-11

## 2023-08-11 PROBLEM — K70.31 ALCOHOLIC CIRRHOSIS OF LIVER WITH ASCITES: Status: ACTIVE | Noted: 2023-07-14

## 2023-08-11 PROBLEM — Z12.31 SCREENING MAMMOGRAM, ENCOUNTER FOR: Status: RESOLVED | Noted: 2020-10-05 | Resolved: 2023-08-11

## 2023-08-11 PROBLEM — R25.2 LEG CRAMPS: Status: RESOLVED | Noted: 2021-02-05 | Resolved: 2023-08-11

## 2023-08-11 NOTE — TELEPHONE ENCOUNTER
Patient states she was prescribed Gabapentin 600 mg , oral, 3 times/day and Tramadol 50 mg once daily. Patient called for assistance with her dosage scheduling. Patient advised to take her Gabapentin every 8 hours for 3 times/day and to take her Tramadol once a day between the evening and bedtime dose or an hour before bedtime. Patient states understanding of care advice and cites no additional concerns at this time.                 Reason for Disposition   General information question, no triage required and triager able to answer question    Additional Information   Negative: New-onset or worsening symptoms, see that protocol (e.g., diarrhea, runny nose, sore throat)   Negative: Medicine question not related to refill or renewal   Negative: Requesting a renewal or refill of a medicine patient is currently taking   Negative: Questions or concerns about high blood pressure   Negative: Nursing judgment   Negative: Nursing judgment   Negative: Nursing judgment   Negative: Requesting lab results and adult stable (no new symptoms, not worsening)   Negative: Requesting referral to a specialist   Negative: Questions about durable medical equipment ordered and triager unable to answer   Negative: Requesting regular office appointment and adult stable (no new symptoms, not worsening)   Negative: Health information question, no triage required and triager able to answer question    Protocols used: Information Only Call - No Triage-A-[place]

## 2023-08-16 ENCOUNTER — HOSPITAL ENCOUNTER (OUTPATIENT)
Dept: RADIOLOGY | Facility: HOSPITAL | Age: 58
Discharge: HOME | End: 2023-08-16
Attending: INTERNAL MEDICINE
Payer: COMMERCIAL

## 2023-08-16 DIAGNOSIS — K70.31 ASCITES DUE TO ALCOHOLIC CIRRHOSIS: ICD-10-CM

## 2023-08-16 PROCEDURE — 49083 ABD PARACENTESIS W/IMAGING: CPT

## 2023-08-16 RX ORDER — LIDOCAINE HYDROCHLORIDE 10 MG/ML
INJECTION INFILTRATION; PERINEURAL
Status: DISPENSED
Start: 2023-08-16 | End: 2023-08-16

## 2023-08-17 ENCOUNTER — TELEPHONE (OUTPATIENT)
Dept: HEPATOLOGY | Facility: CLINIC | Age: 58
End: 2023-08-17

## 2023-08-17 ENCOUNTER — OFFICE VISIT (OUTPATIENT)
Dept: HEPATOLOGY | Facility: CLINIC | Age: 58
End: 2023-08-17
Payer: COMMERCIAL

## 2023-08-17 DIAGNOSIS — F90.9 ATTENTION DEFICIT HYPERACTIVITY DISORDER (ADHD), UNSPECIFIED ADHD TYPE: ICD-10-CM

## 2023-08-17 DIAGNOSIS — K70.31 ALCOHOLIC CIRRHOSIS OF LIVER WITH ASCITES: Primary | ICD-10-CM

## 2023-08-17 PROCEDURE — 99215 PR OFFICE/OUTPT VISIT, EST, LEVL V, 40-54 MIN: ICD-10-PCS | Mod: 95,,, | Performed by: INTERNAL MEDICINE

## 2023-08-17 PROCEDURE — 99215 OFFICE O/P EST HI 40 MIN: CPT | Mod: 95,,, | Performed by: INTERNAL MEDICINE

## 2023-08-17 NOTE — TELEPHONE ENCOUNTER
Patient: Claire Casper       MRN: [id number]      : 1965     Age: 57 y.o.  [address] [address]      Providers: Sanjeev Esquivel MD    Priority of review: Cancer    Patient Transplant Status: Hepatology    Reason for presentation: Indeterminate lesion    Clinical Summary: 57 year old with alcohol related liver disease- stopped drinking 2 months ago  AFP normal    Liver lesions ?HCC    Imaging to be reviewed: CT + MRI    HCC Treatment History: none    Platelets:   Lab Results   Component Value Date/Time     2023 10:43 AM     2023 05:48 AM     Creatinine:   Lab Results   Component Value Date/Time    CREATININE 0.65 2023 10:43 AM    CREATININE 0.6 2023 05:48 AM     Bilirubin:   Lab Results   Component Value Date/Time    BILITOT 0.9 2023 10:43 AM    BILITOT 1.2 (H) 2023 05:48 AM     AFP Last 3 each if available:   Lab Results   Component Value Date/Time    AFP 5.3 2023 04:53 AM       MELD: MELD 3.0: 17 at 2023 10:43 AM  MELD-Na: 8 at 2023 10:43 AM  Calculated from:  Serum Creatinine: 0.65 mg/dL (Using min of 1 mg/dL) at 2023 10:43 AM  Serum Sodium: 130 mmol/L at 2023 10:43 AM  Total Bilirubin: 0.9 mg/dL (Using min of 1 mg/dL) at 2023 10:43 AM  Serum Albumin: 2.5 g/dL at 2023 10:43 AM  INR(ratio): 1.2 at 2023 10:43 AM  Age at listing (hypothetical): 57 years  Sex: Female at 2023 10:43 AM      Plan:     Follow-up Provider:

## 2023-08-17 NOTE — PROGRESS NOTES
Subjective:       Patient ID: Claire Casper is a 57 y.o. female.    Chief Complaint: No chief complaint on file.  The patient location is: Home  The chief complaint leading to consultation is: Cirrhosis    Visit type: audiovisual    Face to Face time with patient: 20 minutes of total time spent on the encounter, which includes face to face time and non-face to face time preparing to see the patient (eg, review of tests), Obtaining and/or reviewing separately obtained history, Documenting clinical information in the electronic or other health record, Independently interpreting results (not separately reported) and communicating results to the patient/family/caregiver, or Care coordination (not separately reported).       Each patient to whom he or she provides medical services by telemedicine is:  (1) informed of the relationship between the physician and patient and the respective role of any other health care provider with respect to management of the patient; and (2) notified that he or she may decline to receive medical services by telemedicine and may withdraw from such care at any time.    Notes:    HPI  I saw this 57 y.o. lady for a follow up visit.  She was in hospital in mid July 2023 with decompensated alcohol related cirrhosis- anasarca and ascites for 2 months.    - Imaging showed ?HCC    CT abdo: 7/14/2023  1. Morphologic changes of hepatic cirrhosis and findings of portal hypertension including moderate abdominopelvic ascites and upper abdominal varices including paraesophageal varices.  2. Ill-defined hypodensity in the right hepatic lobe highly concerning for hepatocellular carcinoma.  Further evaluation with nonemergent MRI of the abdomen with and without contrast is recommended.  3. Small left pleural effusion.  4. Cholelithiasis without acute cholecystitis.  5. Mild body wall anasarca.    MRI abdo: 8/7/2023  1. Cirrhosis with sequela of portal hypertension including paraesophageal  varices.  2. Two liver lesions which are favored to represent hemangiomas but can be followed on surveillance scans.  No arterial hyperenhancing lesions suspicious for HCC.  3. Moderate ascites.    AFP 5.3  Peth on 7/14/23= 93    On diuretics= furosemide 20 mg daily + spironoloctanone 50 mg zane      Walking better  Eating well  No alcohol since last appointment    - Paracentesis every week but decreasing amounts- last week it was only 1 liter    MELD 3.0: 17 at 8/2/2023 10:43 AM  MELD-Na: 8 at 8/2/2023 10:43 AM  Calculated from:  Serum Creatinine: 0.65 mg/dL (Using min of 1 mg/dL) at 8/2/2023 10:43 AM  Serum Sodium: 130 mmol/L at 8/2/2023 10:43 AM  Total Bilirubin: 0.9 mg/dL (Using min of 1 mg/dL) at 8/2/2023 10:43 AM  Serum Albumin: 2.5 g/dL at 8/2/2023 10:43 AM  INR(ratio): 1.2 at 8/2/2023 10:43 AM  Age at listing (hypothetical): 57 years  Sex: Female at 8/2/2023 10:43 AM      PMH:  ADHD  Alcohol related liver disease      Review of Systems   Constitutional:  Negative for activity change, appetite change, chills, fatigue, fever and unexpected weight change.   HENT:  Negative for ear pain, hearing loss, nosebleeds, sore throat and trouble swallowing.    Eyes:  Negative for redness and visual disturbance.   Respiratory:  Negative for cough, chest tightness, shortness of breath and wheezing.    Cardiovascular:  Negative for chest pain and palpitations.   Gastrointestinal:  Negative for abdominal distention, abdominal pain, blood in stool, constipation, diarrhea, nausea and vomiting.   Genitourinary:  Negative for difficulty urinating, dysuria, frequency, hematuria and urgency.   Musculoskeletal:  Negative for arthralgias, back pain, gait problem, joint swelling and myalgias.   Skin:  Negative for rash.   Neurological:  Negative for tremors, seizures, speech difficulty, weakness and headaches.   Hematological:  Negative for adenopathy.   Psychiatric/Behavioral:  Negative for confusion, decreased concentration and  sleep disturbance. The patient is not nervous/anxious.            Lab Results   Component Value Date    ALT 32 08/02/2023    AST 81 (H) 08/02/2023    ALKPHOS 158 (H) 08/02/2023    BILITOT 0.9 08/02/2023     Past Medical History:   Diagnosis Date    Sleep difficulties     Therapy      Past Surgical History:   Procedure Laterality Date    AUGMENTATION OF BREAST      SINUS SURGERY       Current Outpatient Medications   Medication Sig    bisacodyL (DULCOLAX) 5 mg EC tablet Take 5 mg by mouth daily as needed for Constipation.    cloNIDine (CATAPRES) 0.1 MG tablet Take 1 tab by mouth each evening as needed for sleep    dextroamphetamine-amphetamine 30 mg Tab Take 1 tablet (30 mg total) by mouth Daily.    folic acid (FOLVITE) 1 MG tablet Take 1 tablet (1 mg total) by mouth once daily.    furosemide (LASIX) 20 MG tablet Take 1 tablet (20 mg total) by mouth once daily.    gabapentin (NEURONTIN) 600 MG tablet Take 1 tablet (600 mg total) by mouth 3 (three) times daily.    hydrOXYzine HCL (ATARAX) 25 MG tablet Take 1 tablet (25 mg total) by mouth daily as needed for Anxiety.    loratadine (CLARITIN) 10 mg tablet Take 10 mg by mouth daily as needed for Allergies.    magnesium oxide (MAG-OX) 400 mg (241.3 mg magnesium) tablet Take 1 tablet (400 mg total) by mouth 2 (two) times daily.    potassium chloride (MICRO-K) 10 MEQ CpSR Take 1 capsule (10 mEq total) by mouth once daily.    spironolactone (ALDACTONE) 50 MG tablet Take 1 tablet (50 mg total) by mouth once daily.    traMADoL (ULTRAM) 50 mg tablet Take 1 tablet (50 mg total) by mouth every 24 hours as needed for Pain.     No current facility-administered medications for this visit.       Objective:      Physical Exam    NOT DONE-VIDEO VISIT  Assessment:       1. Alcoholic cirrhosis of liver with ascites    2. Attention deficit hyperactivity disorder (ADHD), unspecified ADHD type          Plan:   She has decompensated cirrhosis due to alcohol and tells me that she stopped  drinking completely in May 2023.     She is on very low dose diuretics because of hyponatremia and she has been having weekly paracentesis with reduced volumes drained every week.  She apears to be improving.  She is now committed to lifelong abstinence but has not yet had any formal rehab.    - MRI ressuring- no obvious HCC    1) Labs every 2 weeks  2) Repeat MRI in 3 months.      Clinic in 3 months.

## 2023-08-18 ENCOUNTER — PATIENT MESSAGE (OUTPATIENT)
Dept: HEPATOLOGY | Facility: CLINIC | Age: 58
End: 2023-08-18
Payer: COMMERCIAL

## 2023-08-23 ENCOUNTER — LAB VISIT (OUTPATIENT)
Dept: LAB | Facility: HOSPITAL | Age: 58
End: 2023-08-23
Attending: INTERNAL MEDICINE
Payer: COMMERCIAL

## 2023-08-23 DIAGNOSIS — K70.31 ALCOHOLIC CIRRHOSIS OF LIVER WITH ASCITES: ICD-10-CM

## 2023-08-23 LAB
ALBUMIN SERPL-MCNC: 2.8 G/DL (ref 3.5–5)
ALBUMIN/GLOB SERPL: 0.8 RATIO (ref 1.1–2)
ALP SERPL-CCNC: 138 UNIT/L (ref 40–150)
ALT SERPL-CCNC: 27 UNIT/L (ref 0–55)
AST SERPL-CCNC: 55 UNIT/L (ref 5–34)
BASOPHILS # BLD AUTO: 0.18 X10(3)/MCL
BASOPHILS NFR BLD AUTO: 1.3 %
BILIRUB SERPL-MCNC: 0.5 MG/DL
BUN SERPL-MCNC: 11 MG/DL (ref 9.8–20.1)
CALCIUM SERPL-MCNC: 9.9 MG/DL (ref 8.4–10.2)
CHLORIDE SERPL-SCNC: 107 MMOL/L (ref 98–107)
CO2 SERPL-SCNC: 22 MMOL/L (ref 22–29)
CREAT SERPL-MCNC: 0.71 MG/DL (ref 0.55–1.02)
EOSINOPHIL # BLD AUTO: 1.43 X10(3)/MCL (ref 0–0.9)
EOSINOPHIL NFR BLD AUTO: 10.7 %
ERYTHROCYTE [DISTWIDTH] IN BLOOD BY AUTOMATED COUNT: 13.7 % (ref 11.5–17)
GFR SERPLBLD CREATININE-BSD FMLA CKD-EPI: >60 MLS/MIN/1.73/M2
GLOBULIN SER-MCNC: 3.7 GM/DL (ref 2.4–3.5)
GLUCOSE SERPL-MCNC: 112 MG/DL (ref 74–100)
HCT VFR BLD AUTO: 32.4 % (ref 37–47)
HGB BLD-MCNC: 10.9 G/DL (ref 12–16)
IMM GRANULOCYTES # BLD AUTO: 0.07 X10(3)/MCL (ref 0–0.04)
IMM GRANULOCYTES NFR BLD AUTO: 0.5 %
INR PPP: 1.1
LYMPHOCYTES # BLD AUTO: 2.6 X10(3)/MCL (ref 0.6–4.6)
LYMPHOCYTES NFR BLD AUTO: 19.5 %
MCH RBC QN AUTO: 34.2 PG (ref 27–31)
MCHC RBC AUTO-ENTMCNC: 33.6 G/DL (ref 33–36)
MCV RBC AUTO: 101.6 FL (ref 80–94)
MONOCYTES # BLD AUTO: 1.04 X10(3)/MCL (ref 0.1–1.3)
MONOCYTES NFR BLD AUTO: 7.8 %
NEUTROPHILS # BLD AUTO: 8.04 X10(3)/MCL (ref 2.1–9.2)
NEUTROPHILS NFR BLD AUTO: 60.2 %
NRBC BLD AUTO-RTO: 0 %
PLATELET # BLD AUTO: 321 X10(3)/MCL (ref 130–400)
PMV BLD AUTO: 9.2 FL (ref 7.4–10.4)
POTASSIUM SERPL-SCNC: 4.4 MMOL/L (ref 3.5–5.1)
PROT SERPL-MCNC: 6.5 GM/DL (ref 6.4–8.3)
PROTHROMBIN TIME: 14.3 SECONDS (ref 12.5–14.5)
RBC # BLD AUTO: 3.19 X10(6)/MCL (ref 4.2–5.4)
SODIUM SERPL-SCNC: 135 MMOL/L (ref 136–145)
WBC # SPEC AUTO: 13.36 X10(3)/MCL (ref 4.5–11.5)

## 2023-08-23 PROCEDURE — 85025 COMPLETE CBC W/AUTO DIFF WBC: CPT

## 2023-08-23 PROCEDURE — 36415 COLL VENOUS BLD VENIPUNCTURE: CPT

## 2023-08-23 PROCEDURE — 80053 COMPREHEN METABOLIC PANEL: CPT

## 2023-08-23 PROCEDURE — 85610 PROTHROMBIN TIME: CPT

## 2023-08-24 ENCOUNTER — OFFICE VISIT (OUTPATIENT)
Dept: PSYCHIATRY | Facility: CLINIC | Age: 58
End: 2023-08-24
Payer: COMMERCIAL

## 2023-08-24 ENCOUNTER — PATIENT MESSAGE (OUTPATIENT)
Dept: PSYCHIATRY | Facility: CLINIC | Age: 58
End: 2023-08-24

## 2023-08-24 DIAGNOSIS — G47.00 INSOMNIA, UNSPECIFIED TYPE: ICD-10-CM

## 2023-08-24 DIAGNOSIS — F90.9 ATTENTION DEFICIT HYPERACTIVITY DISORDER (ADHD), UNSPECIFIED ADHD TYPE: ICD-10-CM

## 2023-08-24 PROCEDURE — 99213 OFFICE O/P EST LOW 20 MIN: CPT | Mod: 95,,, | Performed by: NURSE PRACTITIONER

## 2023-08-24 PROCEDURE — 99213 PR OFFICE/OUTPT VISIT, EST, LEVL III, 20-29 MIN: ICD-10-PCS | Mod: 95,,, | Performed by: NURSE PRACTITIONER

## 2023-08-24 RX ORDER — DEXTROAMPHETAMINE SACCHARATE, AMPHETAMINE ASPARTATE, DEXTROAMPHETAMINE SULFATE AND AMPHETAMINE SULFATE 2.5; 2.5; 2.5; 2.5 MG/1; MG/1; MG/1; MG/1
10 TABLET ORAL 3 TIMES DAILY
Qty: 90 TABLET | Refills: 0 | Status: SHIPPED | OUTPATIENT
Start: 2023-10-23 | End: 2023-11-16

## 2023-08-24 RX ORDER — DEXTROAMPHETAMINE SACCHARATE, AMPHETAMINE ASPARTATE, DEXTROAMPHETAMINE SULFATE AND AMPHETAMINE SULFATE 2.5; 2.5; 2.5; 2.5 MG/1; MG/1; MG/1; MG/1
10 TABLET ORAL 3 TIMES DAILY
Qty: 90 TABLET | Refills: 0 | Status: SHIPPED | OUTPATIENT
Start: 2023-09-23 | End: 2023-09-29 | Stop reason: SDUPTHER

## 2023-08-24 RX ORDER — CLONIDINE HYDROCHLORIDE 0.2 MG/1
0.2 TABLET ORAL NIGHTLY
Qty: 90 TABLET | Refills: 3 | Status: SHIPPED | OUTPATIENT
Start: 2023-08-24 | End: 2024-08-23

## 2023-08-24 RX ORDER — DEXTROAMPHETAMINE SACCHARATE, AMPHETAMINE ASPARTATE, DEXTROAMPHETAMINE SULFATE AND AMPHETAMINE SULFATE 2.5; 2.5; 2.5; 2.5 MG/1; MG/1; MG/1; MG/1
10 TABLET ORAL 3 TIMES DAILY
Qty: 90 TABLET | Refills: 0 | Status: SHIPPED | OUTPATIENT
Start: 2023-08-24 | End: 2023-11-16

## 2023-08-24 NOTE — PROGRESS NOTES
Outpatient Psychiatry Follow-Up Visit (MD/NP)    8/24/2023    Clinical Status of Patient:  Outpatient (Ambulatory)    Chief Complaint:  Claire Casper is a 57 y.o. female who presents today for follow-up of attention problems.  Met with patient.      Last visit was:  1/06/23. Chart and  reviewed.   The patient location is: [facility]   The chief complaint leading to consultation is: attention problems    Visit type: audiovisual    Face to Face time with patient: 30 minutes  30 minutes of total time spent on the encounter, which includes face to face time and non-face to face time preparing to see the patient (eg, review of tests), Obtaining and/or reviewing separately obtained history, Documenting clinical information in the electronic or other health record, Independently interpreting results (not separately reported) and communicating results to the patient/family/caregiver, or Care coordination (not separately reported).     Each patient to whom he or she provides medical services by telemedicine is:  (1) informed of the relationship between the physician and patient and the respective role of any other health care provider with respect to management of the patient; and (2) notified that he or she may decline to receive medical services by telemedicine and may withdraw from such care at any time.    Interval History and Content of Current Session:  Current Psychiatric Medications/changes  DC Adderall XR (national shortage)  Start VYVANSE 40 mg daily  Continue Clonidine 0.1 mg po q hs for insomnia -     Virtual Visit:  Pt present bright affect and euthymic mood. Pt has been unable to get Adderall XR or Vyvanse due to national shortage. Will try IR Adderall..  Requested increase in Clonidine for insomnia  Denies SI/HI/AVH.     Psychotherapy:  Target symptoms: distractability  Why chosen therapy is appropriate versus another modality: relevant to diagnosis  Outcome monitoring methods:  self-report  Therapeutic intervention type: insight oriented psychotherapy  Topics discussed/themes: building skills sets for symptom management, symptom recognition  The patient's response to the intervention is accepting. The patient's progress toward treatment goals is good.   Duration of intervention: 12 minutes.    Review of Systems   PSYCHIATRIC: Pertinant items are noted in the narrative.  CONSTITUTIONAL: No weight gain or loss.   MUSCULOSKELETAL: No pain or stiffness of the joints.  NEUROLOGIC: No weakness, sensory changes, seizures, confusion, memory loss, tremor or other abnormal movements.  ENDOCRINE: No polydipsia or polyuria.  INTEGUMENTARY: No rashes or lacerations.  EYES: No exophthalmos, jaundice or blindness.  ENT: No dizziness, tinnitus or hearing loss.  RESPIRATORY: No shortness of breath.  CARDIOVASCULAR: No tachycardia or chest pain.  GASTROINTESTINAL: No nausea, vomiting, pain, constipation or diarrhea.  GENITOURINARY: No frequency, dysuria or sexual dysfunction.  HEMATOLOGIC/LYMPHATIC: No excessive bleeding, prolonged or excessive bleeding after dental extraction/injury.  ALLERGIC/IMMUNOLOGIC: No allergic response to materials, foods or animals at this time.    Past Medical, Family and Social History: The patient's past medical, family and social history have been reviewed and updated as appropriate within the electronic medical record - see encounter notes.    Compliance: no    Side effects: None    Risk Parameters:  Patient reports no suicidal ideation  Patient reports no homicidal ideation  Patient reports no self-injurious behavior  Patient reports no violent behavior    Exam (detailed: at least 9 elements; comprehensive: all 15 elements)   Constitutional  Vitals:  Most recent vital signs, dated greater than 90 days prior to this appointment, were reviewed.   There were no vitals filed for this visit.     General:  unremarkable, age appropriate     Musculoskeletal  Muscle Strength/Tone:   no tremor, no tic   Gait & Station:  non-ataxic     Psychiatric  Speech:  no latency; no press   Mood & Affect:  steady  congruent and appropriate   Thought Process:  normal and logical   Associations:  intact   Thought Content:  normal, no suicidality, no homicidality, delusions, or paranoia   Insight:  intact   Judgement: behavior is adequate to circumstances   Orientation:  grossly intact   Memory: intact for content of interview   Language: grossly intact   Attention Span & Concentration:  able to focus   Fund of Knowledge:  intact and appropriate to age and level of education     Assessment and Diagnosis   Status/Progress: Based on the examination today, the patient's problem(s) is/are improved and well controlled.  New problems have not been presented today.   Co-morbidities and Lack of compliance are not complicating management of the primary condition.  There are no active rule-out diagnoses for this patient at this time.     General Impression:       ICD-10-CM ICD-9-CM   1. Attention deficit hyperactivity disorder (ADHD), unspecified ADHD type  F90.9 314.01   2. Insomnia, unspecified type  G47.00 780.52     Intervention/Counseling/Treatment Plan   Medication Management: Continue current medications. The risks and benefits of medication were discussed with the patient.  DC Vyvanse  (national shortage)  Adderall 10 mg 3 x daily  Increase to Clonidine 0.2 mg po q hs for insomnia -     Return to Clinic: 3 months    Risks, benefits, side effects and alternative treatments discussed with patient. Patient agrees with the current plan as documented.  Encouraged Patient to keep future appointments.  Take medications as prescribed and abstain from substance abuse.  Pt to present to ED for thoughts to harm herself or others

## 2023-08-29 ENCOUNTER — HOSPITAL ENCOUNTER (OUTPATIENT)
Dept: RADIOLOGY | Facility: HOSPITAL | Age: 58
Discharge: HOME | End: 2023-08-29
Attending: INTERNAL MEDICINE
Payer: COMMERCIAL

## 2023-08-29 DIAGNOSIS — K70.31 ASCITES DUE TO ALCOHOLIC CIRRHOSIS: ICD-10-CM

## 2023-08-29 PROCEDURE — 76705 ECHO EXAM OF ABDOMEN: CPT | Mod: TC

## 2023-09-06 ENCOUNTER — PATIENT MESSAGE (OUTPATIENT)
Dept: PRIMARY CARE CLINIC | Facility: CLINIC | Age: 58
End: 2023-09-06
Payer: COMMERCIAL

## 2023-09-06 ENCOUNTER — PATIENT MESSAGE (OUTPATIENT)
Dept: HEPATOLOGY | Facility: CLINIC | Age: 58
End: 2023-09-06
Payer: COMMERCIAL

## 2023-09-06 DIAGNOSIS — G62.1 ALCOHOLIC PERIPHERAL NEUROPATHY: ICD-10-CM

## 2023-09-07 ENCOUNTER — LAB VISIT (OUTPATIENT)
Dept: LAB | Facility: HOSPITAL | Age: 58
End: 2023-09-07
Attending: INTERNAL MEDICINE
Payer: COMMERCIAL

## 2023-09-07 DIAGNOSIS — K70.31 ALCOHOLIC CIRRHOSIS OF LIVER WITH ASCITES: ICD-10-CM

## 2023-09-07 LAB
ALBUMIN SERPL-MCNC: 3.2 G/DL (ref 3.5–5)
ALBUMIN/GLOB SERPL: 0.8 RATIO (ref 1.1–2)
ALP SERPL-CCNC: 108 UNIT/L (ref 40–150)
ALT SERPL-CCNC: 23 UNIT/L (ref 0–55)
AST SERPL-CCNC: 42 UNIT/L (ref 5–34)
BASOPHILS # BLD AUTO: 0.1 X10(3)/MCL
BASOPHILS NFR BLD AUTO: 1.1 %
BILIRUB SERPL-MCNC: 0.6 MG/DL
BUN SERPL-MCNC: 12.6 MG/DL (ref 9.8–20.1)
CALCIUM SERPL-MCNC: 10.8 MG/DL (ref 8.4–10.2)
CHLORIDE SERPL-SCNC: 103 MMOL/L (ref 98–107)
CO2 SERPL-SCNC: 25 MMOL/L (ref 22–29)
CREAT SERPL-MCNC: 0.81 MG/DL (ref 0.55–1.02)
EOSINOPHIL # BLD AUTO: 0.58 X10(3)/MCL (ref 0–0.9)
EOSINOPHIL NFR BLD AUTO: 6.5 %
ERYTHROCYTE [DISTWIDTH] IN BLOOD BY AUTOMATED COUNT: 13.2 % (ref 11.5–17)
GFR SERPLBLD CREATININE-BSD FMLA CKD-EPI: >60 MLS/MIN/1.73/M2
GLOBULIN SER-MCNC: 4 GM/DL (ref 2.4–3.5)
GLUCOSE SERPL-MCNC: 97 MG/DL (ref 74–100)
HCT VFR BLD AUTO: 35 % (ref 37–47)
HGB BLD-MCNC: 11.2 G/DL (ref 12–16)
IMM GRANULOCYTES # BLD AUTO: 0.03 X10(3)/MCL (ref 0–0.04)
IMM GRANULOCYTES NFR BLD AUTO: 0.3 %
INR PPP: 1.2
LYMPHOCYTES # BLD AUTO: 2.68 X10(3)/MCL (ref 0.6–4.6)
LYMPHOCYTES NFR BLD AUTO: 29.9 %
MCH RBC QN AUTO: 32.1 PG (ref 27–31)
MCHC RBC AUTO-ENTMCNC: 32 G/DL (ref 33–36)
MCV RBC AUTO: 100.3 FL (ref 80–94)
MONOCYTES # BLD AUTO: 0.77 X10(3)/MCL (ref 0.1–1.3)
MONOCYTES NFR BLD AUTO: 8.6 %
NEUTROPHILS # BLD AUTO: 4.8 X10(3)/MCL (ref 2.1–9.2)
NEUTROPHILS NFR BLD AUTO: 53.6 %
NRBC BLD AUTO-RTO: 0 %
PLATELET # BLD AUTO: 251 X10(3)/MCL (ref 130–400)
PMV BLD AUTO: 10.3 FL (ref 7.4–10.4)
POTASSIUM SERPL-SCNC: 3.4 MMOL/L (ref 3.5–5.1)
PROT SERPL-MCNC: 7.2 GM/DL (ref 6.4–8.3)
PROTHROMBIN TIME: 15.2 SECONDS (ref 12.5–14.5)
RBC # BLD AUTO: 3.49 X10(6)/MCL (ref 4.2–5.4)
SODIUM SERPL-SCNC: 139 MMOL/L (ref 136–145)
WBC # SPEC AUTO: 8.96 X10(3)/MCL (ref 4.5–11.5)

## 2023-09-07 PROCEDURE — 80053 COMPREHEN METABOLIC PANEL: CPT

## 2023-09-07 PROCEDURE — 85610 PROTHROMBIN TIME: CPT

## 2023-09-07 PROCEDURE — 36415 COLL VENOUS BLD VENIPUNCTURE: CPT

## 2023-09-07 PROCEDURE — 85025 COMPLETE CBC W/AUTO DIFF WBC: CPT

## 2023-09-07 RX ORDER — GABAPENTIN 600 MG/1
600 TABLET ORAL 3 TIMES DAILY
Qty: 90 TABLET | Refills: 0 | Status: SHIPPED | OUTPATIENT
Start: 2023-09-07 | End: 2023-09-11 | Stop reason: SDUPTHER

## 2023-09-11 ENCOUNTER — OFFICE VISIT (OUTPATIENT)
Dept: PRIMARY CARE CLINIC | Facility: CLINIC | Age: 58
End: 2023-09-11
Payer: MEDICARE

## 2023-09-11 VITALS
DIASTOLIC BLOOD PRESSURE: 68 MMHG | HEIGHT: 64 IN | RESPIRATION RATE: 18 BRPM | BODY MASS INDEX: 19.46 KG/M2 | SYSTOLIC BLOOD PRESSURE: 108 MMHG | TEMPERATURE: 97 F | WEIGHT: 114 LBS

## 2023-09-11 DIAGNOSIS — K70.31 ALCOHOLIC CIRRHOSIS OF LIVER WITH ASCITES: Primary | ICD-10-CM

## 2023-09-11 DIAGNOSIS — G62.1 ALCOHOL-INDUCED POLYNEUROPATHY: ICD-10-CM

## 2023-09-11 DIAGNOSIS — G62.1 ALCOHOLIC PERIPHERAL NEUROPATHY: ICD-10-CM

## 2023-09-11 DIAGNOSIS — L70.9 ACNE, UNSPECIFIED ACNE TYPE: ICD-10-CM

## 2023-09-11 PROCEDURE — 99214 OFFICE O/P EST MOD 30 MIN: CPT | Mod: ,,, | Performed by: STUDENT IN AN ORGANIZED HEALTH CARE EDUCATION/TRAINING PROGRAM

## 2023-09-11 PROCEDURE — 99214 PR OFFICE/OUTPT VISIT, EST, LEVL IV, 30-39 MIN: ICD-10-PCS | Mod: ,,, | Performed by: STUDENT IN AN ORGANIZED HEALTH CARE EDUCATION/TRAINING PROGRAM

## 2023-09-11 RX ORDER — CLINDAMYCIN PHOSPHATE 10 UG/ML
LOTION TOPICAL 2 TIMES DAILY
Qty: 60 ML | Refills: 2 | Status: SHIPPED | OUTPATIENT
Start: 2023-09-11

## 2023-09-11 RX ORDER — TRAMADOL HYDROCHLORIDE 50 MG/1
50 TABLET ORAL
Qty: 30 TABLET | Refills: 2 | Status: SHIPPED | OUTPATIENT
Start: 2023-09-11 | End: 2023-10-09 | Stop reason: SDUPTHER

## 2023-09-11 RX ORDER — GABAPENTIN 600 MG/1
600 TABLET ORAL 3 TIMES DAILY
Qty: 90 TABLET | Refills: 2 | Status: SHIPPED | OUTPATIENT
Start: 2023-09-11 | End: 2023-12-06 | Stop reason: SDUPTHER

## 2023-09-11 NOTE — PROGRESS NOTES
Chief Complaint  Chief Complaint   Patient presents with    Alcoholic Neuropathy Follow Up     Acne     Facial breakout        HPI  Claire Casper is a 57 y.o. female with medical diagnoses as listed in the medical history and problem list that presents for neuropathy follow up. Patient has been taking tramadol and gabapentin for pain which helps significantly/ Patient also change her diet and monitor what she eats; she has not needed paracentesis x 2 weeks.   Today patient also complains of skin break out in her face. Patient denies history of acne. There are some pus pocket breaking out too.     Health Maintenance         Date Due Completion Date    Pneumococcal Vaccines (Age 0-64) (1 - PCV) Never done ---    TETANUS VACCINE Never done ---    Colorectal Cancer Screening Never done ---    Shingles Vaccine (1 of 2) Never done ---    Mammogram 12/07/2021 12/7/2020    COVID-19 Vaccine (4 - Pfizer series) 02/16/2022 12/22/2021    Influenza Vaccine (1) 09/01/2023 10/28/2022    Cervical Cancer Screening 12/07/2025 12/7/2020    Lipid Panel 02/11/2026 2/11/2021            ALLERGIES AND MEDICATIONS: updated and reviewed.  Review of patient's allergies indicates:   Allergen Reactions    Hydrocodone-acetaminophen Nausea And Vomiting and Other (See Comments)     No reaction noted in other system       Current Outpatient Medications   Medication Sig Dispense Refill    cloNIDine (CATAPRES) 0.1 MG tablet Take 1 tab by mouth each evening as needed for sleep 90 tablet 3    cloNIDine (CATAPRES) 0.2 MG tablet Take 1 tablet (0.2 mg total) by mouth nightly. 90 tablet 3    dextroamphetamine-amphetamine 10 mg Tab Take 1 tablet (10 mg total) by mouth 3 (three) times daily. 90 tablet 0    folic acid (FOLVITE) 1 MG tablet Take 1 tablet (1 mg total) by mouth once daily. 90 tablet 3    furosemide (LASIX) 20 MG tablet Take 1 tablet (20 mg total) by mouth once daily. 90 tablet 3    loratadine (CLARITIN) 10 mg tablet Take 10 mg by mouth  "daily as needed for Allergies.      spironolactone (ALDACTONE) 50 MG tablet Take 1 tablet (50 mg total) by mouth once daily. 90 tablet 3    bisacodyL (DULCOLAX) 5 mg EC tablet Take 5 mg by mouth daily as needed for Constipation.      clindamycin (CLEOCIN T) 1 % lotion Apply topically 2 (two) times daily. 60 mL 2    [START ON 9/23/2023] dextroamphetamine-amphetamine 10 mg Tab Take 1 tablet (10 mg total) by mouth 3 (three) times daily. (Patient not taking: Reported on 9/11/2023) 90 tablet 0    [START ON 10/23/2023] dextroamphetamine-amphetamine 10 mg Tab Take 1 tablet (10 mg total) by mouth 3 (three) times daily. (Patient not taking: Reported on 9/11/2023) 90 tablet 0    gabapentin (NEURONTIN) 600 MG tablet Take 1 tablet (600 mg total) by mouth 3 (three) times daily. 90 tablet 2    traMADoL (ULTRAM) 50 mg tablet Take 1 tablet (50 mg total) by mouth every 24 hours as needed for Pain. 30 tablet 2     No current facility-administered medications for this visit.       Histories are reviewed and updated as appropriate     Review of Systems  Comprehensive review of system performed- negative except noted in HPI       Objective:   Vitals:    09/11/23 1418   BP: 108/68   BP Location: Left arm   Patient Position: Sitting   BP Method: Large (Manual)   Resp: 18   Temp: 97.3 °F (36.3 °C)   TempSrc: Oral   Weight: 51.7 kg (114 lb)   Height: 5' 4" (1.626 m)    Body mass index is 19.57 kg/m².  Physical Exam  Vitals and nursing note reviewed.   Constitutional:       General: She is not in acute distress.     Appearance: Normal appearance.   HENT:      Head: Normocephalic and atraumatic.      Mouth/Throat:      Mouth: Mucous membranes are moist.   Eyes:      Extraocular Movements: Extraocular movements intact.      Conjunctiva/sclera: Conjunctivae normal.   Cardiovascular:      Rate and Rhythm: Normal rate.   Pulmonary:      Effort: Pulmonary effort is normal.   Musculoskeletal:         General: Normal range of motion.      Cervical " back: Normal range of motion.   Skin:     General: Skin is warm and dry.      Comments: Comedones acne lower face and chin    Neurological:      General: No focal deficit present.      Mental Status: She is alert and oriented to person, place, and time. Mental status is at baseline.      Sensory: Sensory deficit present.      Motor: Weakness present.      Coordination: Coordination abnormal.      Gait: Gait normal.   Psychiatric:         Mood and Affect: Mood normal.         Behavior: Behavior normal.         Thought Content: Thought content normal.         Judgment: Judgment normal.           Assessment & Plan  1. Alcoholic cirrhosis of liver with ascites  Overview:  Patient follows a liver specialist in [facility] appt 8/17/2023      2. Alcohol-induced polyneuropathy: stable with current regimen   -     traMADoL (ULTRAM) 50 mg tablet; Take 1 tablet (50 mg total) by mouth every 24 hours as needed for Pain.  Dispense: 30 tablet; Refill: 2  -     gabapentin (NEURONTIN) 600 MG tablet; Take 1 tablet (600 mg total) by mouth 3 (three) times daily.  Dispense: 90 tablet; Refill: 2    3. Acne, unspecified acne type  -     clindamycin (CLEOCIN T) 1 % lotion; Apply topically 2 (two) times daily.  Dispense: 60 mL; Refill: 2. To use as needed only       RTC in 3 months for routine follow up

## 2023-09-14 ENCOUNTER — TELEPHONE (OUTPATIENT)
Dept: PRIMARY CARE CLINIC | Facility: CLINIC | Age: 58
End: 2023-09-14
Payer: COMMERCIAL

## 2023-09-14 DIAGNOSIS — K70.31 ALCOHOLIC CIRRHOSIS OF LIVER WITH ASCITES: ICD-10-CM

## 2023-09-14 DIAGNOSIS — E87.6 HYPOKALEMIA: ICD-10-CM

## 2023-09-14 DIAGNOSIS — E83.42 HYPOMAGNESEMIA: Primary | ICD-10-CM

## 2023-09-14 RX ORDER — SPIRONOLACTONE 50 MG/1
50 TABLET, FILM COATED ORAL DAILY
Qty: 90 TABLET | Refills: 3 | Status: SHIPPED | OUTPATIENT
Start: 2023-09-14 | End: 2024-09-13

## 2023-09-14 RX ORDER — FOLIC ACID 1 MG/1
1 TABLET ORAL DAILY
Qty: 90 TABLET | Refills: 3 | Status: SHIPPED | OUTPATIENT
Start: 2023-09-14

## 2023-09-14 RX ORDER — POTASSIUM CHLORIDE 750 MG/1
10 CAPSULE, EXTENDED RELEASE ORAL DAILY
Qty: 90 CAPSULE | Refills: 3 | Status: SHIPPED | OUTPATIENT
Start: 2023-09-14 | End: 2024-09-13

## 2023-09-14 RX ORDER — CALCIUM CARBONATE 300MG(750)
1 TABLET,CHEWABLE ORAL DAILY
Qty: 90 TABLET | Refills: 3 | Status: SHIPPED | OUTPATIENT
Start: 2023-09-14

## 2023-09-14 RX ORDER — FUROSEMIDE 20 MG/1
20 TABLET ORAL DAILY
Qty: 90 TABLET | Refills: 3 | Status: SHIPPED | OUTPATIENT
Start: 2023-09-14 | End: 2024-09-13

## 2023-09-14 NOTE — TELEPHONE ENCOUNTER
----- Message from Alexandria Richard sent at 9/14/2023  3:19 PM CDT -----  Regarding: advice  Type:  Needs Medical Advice    Who Called: pt  Symptoms (please be specific):    How long has patient had these symptoms:    Ph[facility] and phone #:  [email or web address]  Would the patient rather a call back or a response via [facility]?   Best Call Back Number: [phone]  Additional Information: pt called about needing to give pcp names of medication that were prescribe in the hospital Please advise.   furosemide (LASIX) 20 MG tablet,  spironolactone (ALDACTONE) 50 MG tablet   magnesium oxide (MAG-OX) 400 mg (241.3 mg magnesium) tablet  folic acid (FOLVITE) 1 MG tablet  potassium chloride (MICRO-K) 10 MEQ CpSR

## 2023-09-19 ENCOUNTER — PATIENT MESSAGE (OUTPATIENT)
Dept: ADMINISTRATIVE | Facility: HOSPITAL | Age: 58
End: 2023-09-19
Payer: COMMERCIAL

## 2023-09-20 ENCOUNTER — LAB VISIT (OUTPATIENT)
Dept: LAB | Facility: HOSPITAL | Age: 58
End: 2023-09-20
Attending: INTERNAL MEDICINE
Payer: COMMERCIAL

## 2023-09-20 DIAGNOSIS — K70.31 ALCOHOLIC CIRRHOSIS OF LIVER WITH ASCITES: ICD-10-CM

## 2023-09-20 LAB
ALBUMIN SERPL-MCNC: 3.4 G/DL (ref 3.5–5)
ALBUMIN/GLOB SERPL: 0.9 RATIO (ref 1.1–2)
ALP SERPL-CCNC: 87 UNIT/L (ref 40–150)
ALT SERPL-CCNC: 21 UNIT/L (ref 0–55)
AST SERPL-CCNC: 37 UNIT/L (ref 5–34)
BASOPHILS # BLD AUTO: 0.09 X10(3)/MCL
BASOPHILS NFR BLD AUTO: 1.3 %
BILIRUB SERPL-MCNC: 0.7 MG/DL
BUN SERPL-MCNC: 16.8 MG/DL (ref 9.8–20.1)
CALCIUM SERPL-MCNC: 10.6 MG/DL (ref 8.4–10.2)
CHLORIDE SERPL-SCNC: 106 MMOL/L (ref 98–107)
CO2 SERPL-SCNC: 23 MMOL/L (ref 22–29)
CREAT SERPL-MCNC: 0.79 MG/DL (ref 0.55–1.02)
EOSINOPHIL # BLD AUTO: 0.54 X10(3)/MCL (ref 0–0.9)
EOSINOPHIL NFR BLD AUTO: 7.6 %
ERYTHROCYTE [DISTWIDTH] IN BLOOD BY AUTOMATED COUNT: 13 % (ref 11.5–17)
GFR SERPLBLD CREATININE-BSD FMLA CKD-EPI: >60 MLS/MIN/1.73/M2
GLOBULIN SER-MCNC: 3.7 GM/DL (ref 2.4–3.5)
GLUCOSE SERPL-MCNC: 90 MG/DL (ref 74–100)
HCT VFR BLD AUTO: 36.2 % (ref 37–47)
HGB BLD-MCNC: 11.7 G/DL (ref 12–16)
IMM GRANULOCYTES # BLD AUTO: 0.02 X10(3)/MCL (ref 0–0.04)
IMM GRANULOCYTES NFR BLD AUTO: 0.3 %
INR PPP: 1.2
LYMPHOCYTES # BLD AUTO: 2.43 X10(3)/MCL (ref 0.6–4.6)
LYMPHOCYTES NFR BLD AUTO: 34.4 %
MCH RBC QN AUTO: 31.6 PG (ref 27–31)
MCHC RBC AUTO-ENTMCNC: 32.3 G/DL (ref 33–36)
MCV RBC AUTO: 97.8 FL (ref 80–94)
MONOCYTES # BLD AUTO: 0.51 X10(3)/MCL (ref 0.1–1.3)
MONOCYTES NFR BLD AUTO: 7.2 %
NEUTROPHILS # BLD AUTO: 3.47 X10(3)/MCL (ref 2.1–9.2)
NEUTROPHILS NFR BLD AUTO: 49.2 %
NRBC BLD AUTO-RTO: 0 %
PLATELET # BLD AUTO: 214 X10(3)/MCL (ref 130–400)
PMV BLD AUTO: 10.3 FL (ref 7.4–10.4)
POTASSIUM SERPL-SCNC: 4.1 MMOL/L (ref 3.5–5.1)
PROT SERPL-MCNC: 7.1 GM/DL (ref 6.4–8.3)
PROTHROMBIN TIME: 14.9 SECONDS (ref 12.5–14.5)
RBC # BLD AUTO: 3.7 X10(6)/MCL (ref 4.2–5.4)
SODIUM SERPL-SCNC: 140 MMOL/L (ref 136–145)
WBC # SPEC AUTO: 7.06 X10(3)/MCL (ref 4.5–11.5)

## 2023-09-20 PROCEDURE — 80053 COMPREHEN METABOLIC PANEL: CPT

## 2023-09-20 PROCEDURE — 36415 COLL VENOUS BLD VENIPUNCTURE: CPT

## 2023-09-20 PROCEDURE — 85025 COMPLETE CBC W/AUTO DIFF WBC: CPT

## 2023-09-20 PROCEDURE — 85610 PROTHROMBIN TIME: CPT

## 2023-09-26 ENCOUNTER — PATIENT MESSAGE (OUTPATIENT)
Dept: HEPATOLOGY | Facility: CLINIC | Age: 58
End: 2023-09-26
Payer: MEDICARE

## 2023-09-26 ENCOUNTER — TELEPHONE (OUTPATIENT)
Dept: HEPATOLOGY | Facility: CLINIC | Age: 58
End: 2023-09-26
Payer: MEDICARE

## 2023-09-28 ENCOUNTER — PATIENT MESSAGE (OUTPATIENT)
Dept: PSYCHIATRY | Facility: CLINIC | Age: 58
End: 2023-09-28
Payer: MEDICARE

## 2023-09-29 ENCOUNTER — TELEPHONE (OUTPATIENT)
Dept: PSYCHIATRY | Facility: CLINIC | Age: 58
End: 2023-09-29
Payer: MEDICARE

## 2023-09-29 DIAGNOSIS — F90.9 ATTENTION DEFICIT HYPERACTIVITY DISORDER (ADHD), UNSPECIFIED ADHD TYPE: ICD-10-CM

## 2023-09-29 DIAGNOSIS — F90.9 ATTENTION DEFICIT HYPERACTIVITY DISORDER (ADHD), UNSPECIFIED ADHD TYPE: Primary | ICD-10-CM

## 2023-09-29 RX ORDER — DEXTROAMPHETAMINE SACCHARATE, AMPHETAMINE ASPARTATE, DEXTROAMPHETAMINE SULFATE AND AMPHETAMINE SULFATE 2.5; 2.5; 2.5; 2.5 MG/1; MG/1; MG/1; MG/1
10 TABLET ORAL 3 TIMES DAILY
Qty: 90 TABLET | Refills: 0 | Status: SHIPPED | OUTPATIENT
Start: 2023-09-29 | End: 2023-11-16

## 2023-09-29 RX ORDER — DEXTROAMPHETAMINE SACCHARATE, AMPHETAMINE ASPARTATE MONOHYDRATE, DEXTROAMPHETAMINE SULFATE AND AMPHETAMINE SULFATE 7.5; 7.5; 7.5; 7.5 MG/1; MG/1; MG/1; MG/1
30 CAPSULE, EXTENDED RELEASE ORAL EVERY MORNING
Qty: 30 CAPSULE | Refills: 0 | Status: SHIPPED | OUTPATIENT
Start: 2023-09-29 | End: 2023-10-30 | Stop reason: SDUPTHER

## 2023-10-05 ENCOUNTER — DOCUMENTATION ONLY (OUTPATIENT)
Dept: PRIMARY CARE CLINIC | Facility: CLINIC | Age: 58
End: 2023-10-05
Payer: MEDICARE

## 2023-10-06 ENCOUNTER — LAB VISIT (OUTPATIENT)
Dept: LAB | Facility: OTHER | Age: 58
End: 2023-10-06
Attending: INTERNAL MEDICINE
Payer: MEDICARE

## 2023-10-06 DIAGNOSIS — K70.31 ASCITES DUE TO ALCOHOLIC CIRRHOSIS: ICD-10-CM

## 2023-10-06 LAB
ALBUMIN SERPL BCP-MCNC: 3.7 G/DL (ref 3.5–5.2)
ALP SERPL-CCNC: 96 U/L (ref 55–135)
ALT SERPL W/O P-5'-P-CCNC: 23 U/L (ref 10–44)
ANION GAP SERPL CALC-SCNC: 10 MMOL/L (ref 8–16)
AST SERPL-CCNC: 39 U/L (ref 10–40)
BILIRUB SERPL-MCNC: 0.6 MG/DL (ref 0.1–1)
BUN SERPL-MCNC: 14 MG/DL (ref 6–20)
CALCIUM SERPL-MCNC: 10.8 MG/DL (ref 8.7–10.5)
CHLORIDE SERPL-SCNC: 101 MMOL/L (ref 95–110)
CO2 SERPL-SCNC: 24 MMOL/L (ref 23–29)
CREAT SERPL-MCNC: 0.8 MG/DL (ref 0.5–1.4)
ERYTHROCYTE [DISTWIDTH] IN BLOOD BY AUTOMATED COUNT: 12.9 % (ref 11.5–14.5)
EST. GFR  (NO RACE VARIABLE): >60 ML/MIN/1.73 M^2
GLUCOSE SERPL-MCNC: 98 MG/DL (ref 70–110)
HCT VFR BLD AUTO: 37.2 % (ref 37–48.5)
HGB BLD-MCNC: 12 G/DL (ref 12–16)
INR PPP: 1 (ref 0.8–1.2)
MCH RBC QN AUTO: 30.5 PG (ref 27–31)
MCHC RBC AUTO-ENTMCNC: 32.3 G/DL (ref 32–36)
MCV RBC AUTO: 94 FL (ref 82–98)
PLATELET # BLD AUTO: 222 K/UL (ref 150–450)
PMV BLD AUTO: 9.8 FL (ref 9.2–12.9)
POTASSIUM SERPL-SCNC: 4.3 MMOL/L (ref 3.5–5.1)
PROT SERPL-MCNC: 8.3 G/DL (ref 6–8.4)
PROTHROMBIN TIME: 11.2 SEC (ref 9–12.5)
RBC # BLD AUTO: 3.94 M/UL (ref 4–5.4)
SODIUM SERPL-SCNC: 135 MMOL/L (ref 136–145)
WBC # BLD AUTO: 6.92 K/UL (ref 3.9–12.7)

## 2023-10-06 PROCEDURE — 85027 COMPLETE CBC AUTOMATED: CPT | Performed by: INTERNAL MEDICINE

## 2023-10-06 PROCEDURE — 80053 COMPREHEN METABOLIC PANEL: CPT | Performed by: INTERNAL MEDICINE

## 2023-10-06 PROCEDURE — 85610 PROTHROMBIN TIME: CPT | Performed by: INTERNAL MEDICINE

## 2023-10-06 PROCEDURE — 36415 COLL VENOUS BLD VENIPUNCTURE: CPT | Performed by: INTERNAL MEDICINE

## 2023-10-09 ENCOUNTER — PATIENT MESSAGE (OUTPATIENT)
Dept: PRIMARY CARE CLINIC | Facility: CLINIC | Age: 58
End: 2023-10-09
Payer: MEDICARE

## 2023-10-09 DIAGNOSIS — G62.1 ALCOHOL-INDUCED POLYNEUROPATHY: ICD-10-CM

## 2023-10-09 DIAGNOSIS — Z12.31 BREAST CANCER SCREENING BY MAMMOGRAM: Primary | ICD-10-CM

## 2023-10-09 RX ORDER — TRAMADOL HYDROCHLORIDE 50 MG/1
50 TABLET ORAL
Qty: 30 TABLET | Refills: 2 | Status: CANCELLED | OUTPATIENT
Start: 2023-10-09

## 2023-10-09 RX ORDER — TRAMADOL HYDROCHLORIDE 50 MG/1
50 TABLET ORAL
Qty: 30 TABLET | Refills: 1 | Status: SHIPPED | OUTPATIENT
Start: 2023-10-09 | End: 2023-12-06 | Stop reason: SDUPTHER

## 2023-10-16 ENCOUNTER — PATIENT MESSAGE (OUTPATIENT)
Dept: PRIMARY CARE CLINIC | Facility: CLINIC | Age: 58
End: 2023-10-16
Payer: MEDICARE

## 2023-10-17 ENCOUNTER — HOSPITAL ENCOUNTER (OUTPATIENT)
Dept: RADIOLOGY | Facility: OTHER | Age: 58
Discharge: HOME | End: 2023-10-17
Attending: INTERNAL MEDICINE
Payer: COMMERCIAL

## 2023-10-17 DIAGNOSIS — K70.31 ASCITES DUE TO ALCOHOLIC CIRRHOSIS: ICD-10-CM

## 2023-10-17 PROCEDURE — 76705 ECHO EXAM OF ABDOMEN: CPT | Mod: 26,,, | Performed by: RADIOLOGY

## 2023-10-17 PROCEDURE — 76705 US ABDOMEN LIMITED_FOR ASCITES: ICD-10-PCS | Mod: 26,,, | Performed by: RADIOLOGY

## 2023-10-17 PROCEDURE — 76705 ECHO EXAM OF ABDOMEN: CPT | Mod: TC

## 2023-10-19 ENCOUNTER — LAB VISIT (OUTPATIENT)
Dept: LAB | Facility: OTHER | Age: 58
End: 2023-10-19
Attending: INTERNAL MEDICINE
Payer: MEDICARE

## 2023-10-19 DIAGNOSIS — K70.31 ASCITES DUE TO ALCOHOLIC CIRRHOSIS: ICD-10-CM

## 2023-10-19 LAB
ALBUMIN SERPL BCP-MCNC: 3.6 G/DL (ref 3.5–5.2)
ALP SERPL-CCNC: 104 U/L (ref 55–135)
ALT SERPL W/O P-5'-P-CCNC: 24 U/L (ref 10–44)
ANION GAP SERPL CALC-SCNC: 10 MMOL/L (ref 8–16)
AST SERPL-CCNC: 36 U/L (ref 10–40)
BILIRUB SERPL-MCNC: 0.8 MG/DL (ref 0.1–1)
BUN SERPL-MCNC: 12 MG/DL (ref 6–20)
CALCIUM SERPL-MCNC: 11.2 MG/DL (ref 8.7–10.5)
CHLORIDE SERPL-SCNC: 100 MMOL/L (ref 95–110)
CO2 SERPL-SCNC: 27 MMOL/L (ref 23–29)
CREAT SERPL-MCNC: 0.8 MG/DL (ref 0.5–1.4)
ERYTHROCYTE [DISTWIDTH] IN BLOOD BY AUTOMATED COUNT: 13.2 % (ref 11.5–14.5)
EST. GFR  (NO RACE VARIABLE): >60 ML/MIN/1.73 M^2
GLUCOSE SERPL-MCNC: 103 MG/DL (ref 70–110)
HCT VFR BLD AUTO: 36.1 % (ref 37–48.5)
HGB BLD-MCNC: 11.8 G/DL (ref 12–16)
INR PPP: 1.1 (ref 0.8–1.2)
MCH RBC QN AUTO: 30.6 PG (ref 27–31)
MCHC RBC AUTO-ENTMCNC: 32.7 G/DL (ref 32–36)
MCV RBC AUTO: 94 FL (ref 82–98)
PLATELET # BLD AUTO: 222 K/UL (ref 150–450)
PMV BLD AUTO: 10.2 FL (ref 9.2–12.9)
POTASSIUM SERPL-SCNC: 3.9 MMOL/L (ref 3.5–5.1)
PROT SERPL-MCNC: 7.8 G/DL (ref 6–8.4)
PROTHROMBIN TIME: 11.8 SEC (ref 9–12.5)
RBC # BLD AUTO: 3.85 M/UL (ref 4–5.4)
SODIUM SERPL-SCNC: 137 MMOL/L (ref 136–145)
WBC # BLD AUTO: 6.82 K/UL (ref 3.9–12.7)

## 2023-10-19 PROCEDURE — 80053 COMPREHEN METABOLIC PANEL: CPT | Performed by: INTERNAL MEDICINE

## 2023-10-19 PROCEDURE — 36415 COLL VENOUS BLD VENIPUNCTURE: CPT | Performed by: INTERNAL MEDICINE

## 2023-10-19 PROCEDURE — 85610 PROTHROMBIN TIME: CPT | Performed by: INTERNAL MEDICINE

## 2023-10-19 PROCEDURE — 85027 COMPLETE CBC AUTOMATED: CPT | Performed by: INTERNAL MEDICINE

## 2023-10-27 ENCOUNTER — PATIENT MESSAGE (OUTPATIENT)
Dept: PSYCHIATRY | Facility: CLINIC | Age: 58
End: 2023-10-27
Payer: MEDICARE

## 2023-10-30 ENCOUNTER — PATIENT MESSAGE (OUTPATIENT)
Dept: PSYCHIATRY | Facility: CLINIC | Age: 58
End: 2023-10-30
Payer: MEDICARE

## 2023-10-30 DIAGNOSIS — F90.9 ATTENTION DEFICIT HYPERACTIVITY DISORDER (ADHD), UNSPECIFIED ADHD TYPE: ICD-10-CM

## 2023-10-30 RX ORDER — DEXTROAMPHETAMINE SACCHARATE, AMPHETAMINE ASPARTATE MONOHYDRATE, DEXTROAMPHETAMINE SULFATE AND AMPHETAMINE SULFATE 7.5; 7.5; 7.5; 7.5 MG/1; MG/1; MG/1; MG/1
30 CAPSULE, EXTENDED RELEASE ORAL EVERY MORNING
Qty: 30 CAPSULE | Refills: 0 | Status: SHIPPED | OUTPATIENT
Start: 2023-10-30 | End: 2023-11-16

## 2023-10-31 ENCOUNTER — HOSPITAL ENCOUNTER (OUTPATIENT)
Dept: RADIOLOGY | Facility: OTHER | Age: 58
Discharge: HOME | End: 2023-10-31
Attending: STUDENT IN AN ORGANIZED HEALTH CARE EDUCATION/TRAINING PROGRAM
Payer: COMMERCIAL

## 2023-10-31 DIAGNOSIS — Z12.31 BREAST CANCER SCREENING BY MAMMOGRAM: ICD-10-CM

## 2023-10-31 PROCEDURE — 77063 MAMMO DIGITAL SCREENING BILAT WITH TOMO: ICD-10-PCS | Mod: 26,,, | Performed by: RADIOLOGY

## 2023-10-31 PROCEDURE — 77067 SCR MAMMO BI INCL CAD: CPT | Mod: TC

## 2023-10-31 PROCEDURE — 77067 MAMMO DIGITAL SCREENING BILAT WITH TOMO: ICD-10-PCS | Mod: 26,,, | Performed by: RADIOLOGY

## 2023-10-31 PROCEDURE — 77063 BREAST TOMOSYNTHESIS BI: CPT | Mod: 26,,, | Performed by: RADIOLOGY

## 2023-10-31 PROCEDURE — 77067 SCR MAMMO BI INCL CAD: CPT | Mod: 26,,, | Performed by: RADIOLOGY

## 2023-11-06 ENCOUNTER — PATIENT MESSAGE (OUTPATIENT)
Dept: PRIMARY CARE CLINIC | Facility: CLINIC | Age: 58
End: 2023-11-06
Payer: MEDICARE

## 2023-11-09 ENCOUNTER — PATIENT MESSAGE (OUTPATIENT)
Dept: NEUROLOGY | Facility: CLINIC | Age: 58
End: 2023-11-09
Payer: MEDICARE

## 2023-11-09 ENCOUNTER — PATIENT MESSAGE (OUTPATIENT)
Dept: INTERNAL MEDICINE | Facility: CLINIC | Age: 58
End: 2023-11-09
Payer: MEDICARE

## 2023-11-16 ENCOUNTER — OFFICE VISIT (OUTPATIENT)
Dept: PSYCHIATRY | Facility: CLINIC | Age: 58
End: 2023-11-16
Payer: COMMERCIAL

## 2023-11-16 ENCOUNTER — PATIENT MESSAGE (OUTPATIENT)
Dept: PRIMARY CARE CLINIC | Facility: CLINIC | Age: 58
End: 2023-11-16
Payer: MEDICARE

## 2023-11-16 DIAGNOSIS — L40.9 PSORIASIS: Primary | ICD-10-CM

## 2023-11-16 DIAGNOSIS — F90.9 ATTENTION DEFICIT HYPERACTIVITY DISORDER (ADHD), UNSPECIFIED ADHD TYPE: Primary | ICD-10-CM

## 2023-11-16 DIAGNOSIS — G47.00 INSOMNIA, UNSPECIFIED TYPE: ICD-10-CM

## 2023-11-16 PROCEDURE — 99214 OFFICE O/P EST MOD 30 MIN: CPT | Mod: 95,,, | Performed by: NURSE PRACTITIONER

## 2023-11-16 PROCEDURE — 1160F PR REVIEW ALL MEDS BY PRESCRIBER/CLIN PHARMACIST DOCUMENTED: ICD-10-PCS | Mod: CPTII,95,, | Performed by: NURSE PRACTITIONER

## 2023-11-16 PROCEDURE — 1159F MED LIST DOCD IN RCRD: CPT | Mod: CPTII,95,, | Performed by: NURSE PRACTITIONER

## 2023-11-16 PROCEDURE — 1159F PR MEDICATION LIST DOCUMENTED IN MEDICAL RECORD: ICD-10-PCS | Mod: CPTII,95,, | Performed by: NURSE PRACTITIONER

## 2023-11-16 PROCEDURE — 1160F RVW MEDS BY RX/DR IN RCRD: CPT | Mod: CPTII,95,, | Performed by: NURSE PRACTITIONER

## 2023-11-16 PROCEDURE — 99214 PR OFFICE/OUTPT VISIT, EST, LEVL IV, 30-39 MIN: ICD-10-PCS | Mod: 95,,, | Performed by: NURSE PRACTITIONER

## 2023-11-16 RX ORDER — TRAZODONE HYDROCHLORIDE 100 MG/1
100 TABLET ORAL NIGHTLY PRN
Qty: 30 TABLET | Refills: 11 | Status: SHIPPED | OUTPATIENT
Start: 2023-11-16 | End: 2024-01-24 | Stop reason: SDUPTHER

## 2023-11-16 RX ORDER — DEXTROAMPHETAMINE SACCHARATE, AMPHETAMINE ASPARTATE MONOHYDRATE, DEXTROAMPHETAMINE SULFATE AND AMPHETAMINE SULFATE 7.5; 7.5; 7.5; 7.5 MG/1; MG/1; MG/1; MG/1
30 CAPSULE, EXTENDED RELEASE ORAL EVERY MORNING
Qty: 30 CAPSULE | Refills: 0 | Status: SHIPPED | OUTPATIENT
Start: 2023-12-29 | End: 2024-01-24

## 2023-11-16 RX ORDER — MOMETASONE FUROATE 1 MG/G
CREAM TOPICAL DAILY
Qty: 45 G | Refills: 1 | Status: SHIPPED | OUTPATIENT
Start: 2023-11-16

## 2023-11-16 RX ORDER — DEXTROAMPHETAMINE SACCHARATE, AMPHETAMINE ASPARTATE MONOHYDRATE, DEXTROAMPHETAMINE SULFATE AND AMPHETAMINE SULFATE 7.5; 7.5; 7.5; 7.5 MG/1; MG/1; MG/1; MG/1
30 CAPSULE, EXTENDED RELEASE ORAL EVERY MORNING
Qty: 30 CAPSULE | Refills: 0 | Status: SHIPPED | OUTPATIENT
Start: 2023-11-29 | End: 2024-01-24

## 2023-11-16 RX ORDER — DEXTROAMPHETAMINE SACCHARATE, AMPHETAMINE ASPARTATE MONOHYDRATE, DEXTROAMPHETAMINE SULFATE AND AMPHETAMINE SULFATE 7.5; 7.5; 7.5; 7.5 MG/1; MG/1; MG/1; MG/1
30 CAPSULE, EXTENDED RELEASE ORAL EVERY MORNING
Qty: 30 CAPSULE | Refills: 0 | Status: SHIPPED | OUTPATIENT
Start: 2024-01-28 | End: 2024-01-24

## 2023-11-16 NOTE — PROGRESS NOTES
Outpatient Psychiatry Follow-Up Visit (MD/NP)    11/16/2023    Clinical Status of Patient:  Outpatient (Ambulatory)    Chief Complaint:  Claire Casper is a 57 y.o. female who presents today for follow-up of attention problems.  Met with patient.      Last visit was:  8/24/23. Chart and  reviewed.   The patient location is: [facility]   The chief complaint leading to consultation is: attention problems    Visit type: audiovisual    Face to Face time with patient: 30 minutes  35 minutes of total time spent on the encounter, which includes face to face time and non-face to face time preparing to see the patient (eg, review of tests), Obtaining and/or reviewing separately obtained history, Documenting clinical information in the electronic or other health record, Independently interpreting results (not separately reported) and communicating results to the patient/family/caregiver, or Care coordination (not separately reported).     Each patient to whom he or she provides medical services by telemedicine is:  (1) informed of the relationship between the physician and patient and the respective role of any other health care provider with respect to management of the patient; and (2) notified that he or she may decline to receive medical services by telemedicine and may withdraw from such care at any time.    Interval History and Content of Current Session:  Current Psychiatric Medications/changes  DC Vyvanse  (national shortage)  Adderall 10 mg 3 x daily  Increase to Clonidine 0.2 mg po q hs for insomnia -     Virtual Visit:  Pt present bright affect and euthymic mood. Pt reports good response to Adderall XR for ADHD sx. Reports increase in clonidine has not been effective for insomnia. Will try Trazodone.  Denies SI/HI/AVH.     Psychotherapy:  Target symptoms: distractability  Why chosen therapy is appropriate versus another modality: relevant to diagnosis  Outcome monitoring methods:  self-report  Therapeutic intervention type: insight oriented psychotherapy  Topics discussed/themes: building skills sets for symptom management, symptom recognition  The patient's response to the intervention is accepting. The patient's progress toward treatment goals is good.   Duration of intervention: 12 minutes.    Review of Systems   PSYCHIATRIC: Pertinant items are noted in the narrative.  CONSTITUTIONAL: No weight gain or loss.   MUSCULOSKELETAL: No pain or stiffness of the joints.  NEUROLOGIC: No weakness, sensory changes, seizures, confusion, memory loss, tremor or other abnormal movements.  ENDOCRINE: No polydipsia or polyuria.  INTEGUMENTARY: No rashes or lacerations.  EYES: No exophthalmos, jaundice or blindness.  ENT: No dizziness, tinnitus or hearing loss.  RESPIRATORY: No shortness of breath.  CARDIOVASCULAR: No tachycardia or chest pain.  GASTROINTESTINAL: No nausea, vomiting, pain, constipation or diarrhea.  GENITOURINARY: No frequency, dysuria or sexual dysfunction.  HEMATOLOGIC/LYMPHATIC: No excessive bleeding, prolonged or excessive bleeding after dental extraction/injury.  ALLERGIC/IMMUNOLOGIC: No allergic response to materials, foods or animals at this time.    Past Medical, Family and Social History: The patient's past medical, family and social history have been reviewed and updated as appropriate within the electronic medical record - see encounter notes.    Compliance: no    Side effects: None    Risk Parameters:  Patient reports no suicidal ideation  Patient reports no homicidal ideation  Patient reports no self-injurious behavior  Patient reports no violent behavior    Exam (detailed: at least 9 elements; comprehensive: all 15 elements)   Constitutional  Vitals:  Most recent vital signs, dated greater than 90 days prior to this appointment, were reviewed.   There were no vitals filed for this visit.     General:  unremarkable, age appropriate     Musculoskeletal  Muscle Strength/Tone:   no tremor, no tic   Gait & Station:  non-ataxic     Psychiatric  Speech:  no latency; no press   Mood & Affect:  steady  congruent and appropriate   Thought Process:  normal and logical   Associations:  intact   Thought Content:  normal, no suicidality, no homicidality, delusions, or paranoia   Insight:  intact   Judgement: behavior is adequate to circumstances   Orientation:  grossly intact   Memory: intact for content of interview   Language: grossly intact   Attention Span & Concentration:  able to focus   Fund of Knowledge:  intact and appropriate to age and level of education     Assessment and Diagnosis   Status/Progress: Based on the examination today, the patient's problem(s) is/are improved and well controlled.  New problems have not been presented today.   Co-morbidities and Lack of compliance are not complicating management of the primary condition.  There are no active rule-out diagnoses for this patient at this time.     General Impression:       ICD-10-CM ICD-9-CM   1. Attention deficit hyperactivity disorder (ADHD), unspecified ADHD type  F90.9 314.01   2. Insomnia, unspecified type  G47.00 780.52       Intervention/Counseling/Treatment Plan   Medication Management: Continue current medications. The risks and benefits of medication were discussed with the patient.  Adderall XR 30 mg daily   Trazodone 100 mg before bed as needed for insomnia  Hold Clonidine 0.2 mg po q hs for insomnia -     Return to Clinic: 3 months    Risks, benefits, side effects and alternative treatments discussed with patient. Patient agrees with the current plan as documented.  Encouraged Patient to keep future appointments.  Take medications as prescribed and abstain from substance abuse.  Pt to present to ED for thoughts to harm herself or others

## 2023-11-20 ENCOUNTER — TELEPHONE (OUTPATIENT)
Dept: PRIMARY CARE CLINIC | Facility: CLINIC | Age: 58
End: 2023-11-20
Payer: MEDICARE

## 2023-11-20 ENCOUNTER — HOSPITAL ENCOUNTER (OUTPATIENT)
Dept: RADIOLOGY | Facility: OTHER | Age: 58
Discharge: HOME | End: 2023-11-20
Attending: INTERNAL MEDICINE
Payer: COMMERCIAL

## 2023-11-20 DIAGNOSIS — K70.31 ALCOHOLIC CIRRHOSIS OF LIVER WITH ASCITES: ICD-10-CM

## 2023-11-20 PROCEDURE — 74183 MRI ABD W/O CNTR FLWD CNTR: CPT | Mod: TC

## 2023-11-20 PROCEDURE — 74183 MRI ABDOMEN W WO CONTRAST: ICD-10-PCS | Mod: 26,,, | Performed by: RADIOLOGY

## 2023-11-20 PROCEDURE — 25500020 PHARM REV CODE 255: Performed by: INTERNAL MEDICINE

## 2023-11-20 PROCEDURE — A9585 GADOBUTROL INJECTION: HCPCS | Performed by: INTERNAL MEDICINE

## 2023-11-20 PROCEDURE — 74183 MRI ABD W/O CNTR FLWD CNTR: CPT | Mod: 26,,, | Performed by: RADIOLOGY

## 2023-11-20 RX ORDER — GADOBUTROL 604.72 MG/ML
5 INJECTION INTRAVENOUS
Status: COMPLETED | OUTPATIENT
Start: 2023-11-20 | End: 2023-11-20

## 2023-11-20 RX ADMIN — GADOBUTROL 5 ML: 604.72 INJECTION INTRAVENOUS at 08:11

## 2023-11-20 NOTE — TELEPHONE ENCOUNTER
----- Message from Amy Peralta sent at 11/17/2023  2:55 PM CST -----  Regarding: pharmacy  .Type:  Pharmacy Calling to Clarify an RX    Name of Caller:pharmacy  Pharmacy Name:[facility]/pharmacy #[id number]  Prescription Name:mometasone 0.1% (ELOCON) 0.1 % cream  What do they need to clarify?:pt needs there solution not cream   Best Call Back Number:[phone]  Additional Information: pharmacy called stating the pt needs the solution

## 2023-11-27 ENCOUNTER — PATIENT MESSAGE (OUTPATIENT)
Dept: PRIMARY CARE CLINIC | Facility: CLINIC | Age: 58
End: 2023-11-27
Payer: MEDICARE

## 2023-11-27 ENCOUNTER — TELEPHONE (OUTPATIENT)
Dept: HEPATOLOGY | Facility: CLINIC | Age: 58
End: 2023-11-27

## 2023-11-27 ENCOUNTER — OFFICE VISIT (OUTPATIENT)
Dept: HEPATOLOGY | Facility: CLINIC | Age: 58
End: 2023-11-27
Payer: COMMERCIAL

## 2023-11-27 DIAGNOSIS — L40.9 PSORIASIS: ICD-10-CM

## 2023-11-27 DIAGNOSIS — K70.31 ALCOHOLIC CIRRHOSIS OF LIVER WITH ASCITES: Primary | ICD-10-CM

## 2023-11-27 PROCEDURE — 99215 OFFICE O/P EST HI 40 MIN: CPT | Mod: 95,,, | Performed by: INTERNAL MEDICINE

## 2023-11-27 PROCEDURE — 99215 PR OFFICE/OUTPT VISIT, EST, LEVL V, 40-54 MIN: ICD-10-PCS | Mod: 95,,, | Performed by: INTERNAL MEDICINE

## 2023-11-27 NOTE — TELEPHONE ENCOUNTER
Patient contacted to schedule Labs, MRI and 6 Months follow up, as per provider.  An appointment has been scheduled on Wednesday, May 8, 2024 at 11AM ([facility]).  A recall for 6 months follow-up has been submitted.

## 2023-11-27 NOTE — PROGRESS NOTES
Subjective:       Patient ID: Claire Casper is a 58 y.o. female.    Chief Complaint: No chief complaint on file.  The patient location is: Home  The chief complaint leading to consultation is: Cirrhosis    Visit type: audiovisual    Face to Face time with patient: 20 minutes of total time spent on the encounter, which includes face to face time and non-face to face time preparing to see the patient (eg, review of tests), Obtaining and/or reviewing separately obtained history, Documenting clinical information in the electronic or other health record, Independently interpreting results (not separately reported) and communicating results to the patient/family/caregiver, or Care coordination (not separately reported).       Each patient to whom he or she provides medical services by telemedicine is:  (1) informed of the relationship between the physician and patient and the respective role of any other health care provider with respect to management of the patient; and (2) notified that he or she may decline to receive medical services by telemedicine and may withdraw from such care at any time.    Notes:    HPI  I saw this 58 y.o. lady for a follow up visit.  She was in hospital in mid July 2023 with decompensated alcohol related cirrhosis- anasarca and ascites for 2 months.  Last seen in Aug 2023.    - Imaging showed ?HCC    CT abdo: 7/14/2023  1. Morphologic changes of hepatic cirrhosis and findings of portal hypertension including moderate abdominopelvic ascites and upper abdominal varices including paraesophageal varices.  2. Ill-defined hypodensity in the right hepatic lobe highly concerning for hepatocellular carcinoma.  Further evaluation with nonemergent MRI of the abdomen with and without contrast is recommended.  3. Small left pleural effusion.  4. Cholelithiasis without acute cholecystitis.  5. Mild body wall anasarca.    MRI abdo: 8/7/2023  1. Cirrhosis with sequela of portal hypertension including  paraesophageal varices.  2. Two liver lesions which are favored to represent hemangiomas but can be followed on surveillance scans.  No arterial hyperenhancing lesions suspicious for HCC.  3. Moderate ascites.    MRI abdo: 11/20/23  Overall, stable exam compared to prior.  Liver lesions are stable with imaging features typical of hemangiomas.  No suspicious arterial phase enhancing lesion.  Findings consistent with cirrhosis and portal hypertension including portosystemic varices.    AFP 5.3  Peth on 7/14/23= 93    On diuretics= furosemide 20 mg daily + spironoloctanone 50 mg daily  Some ankle edema      Walking better  Eating well  No alcohol since last appointment    - Paracentesis every week but decreasing amounts- last week it was only 1 liter    MELD 3.0: 8 at 10/31/2023  3:20 PM  MELD-Na: 7 at 10/31/2023  3:20 PM  Calculated from:  Serum Creatinine: 0.9 mg/dL (Using min of 1 mg/dL) at 10/31/2023  3:20 PM  Serum Sodium: 138 mmol/L (Using max of 137 mmol/L) at 10/31/2023  3:20 PM  Total Bilirubin: 0.8 mg/dL (Using min of 1 mg/dL) at 10/31/2023  3:20 PM  Serum Albumin: 3.7 g/dL (Using max of 3.5 g/dL) at 10/31/2023  3:20 PM  INR(ratio): 1.1 at 10/31/2023  3:20 PM  Age at listing (hypothetical): 57 years  Sex: Female at 10/31/2023  3:20 PM      PMH:  ADHD  Alcohol related liver disease      Review of Systems   Constitutional:  Negative for activity change, appetite change, chills, fatigue, fever and unexpected weight change.   HENT:  Negative for ear pain, hearing loss, nosebleeds, sore throat and trouble swallowing.    Eyes:  Negative for redness and visual disturbance.   Respiratory:  Negative for cough, chest tightness, shortness of breath and wheezing.    Cardiovascular:  Negative for chest pain and palpitations.   Gastrointestinal:  Negative for abdominal distention, abdominal pain, blood in stool, constipation, diarrhea, nausea and vomiting.   Genitourinary:  Negative for difficulty urinating, dysuria,  frequency, hematuria and urgency.   Musculoskeletal:  Negative for arthralgias, back pain, gait problem, joint swelling and myalgias.   Skin:  Negative for rash.   Neurological:  Negative for tremors, seizures, speech difficulty, weakness and headaches.   Hematological:  Negative for adenopathy.   Psychiatric/Behavioral:  Negative for confusion, decreased concentration and sleep disturbance. The patient is not nervous/anxious.            Lab Results   Component Value Date    ALT 21 10/31/2023    AST 33 10/31/2023    ALKPHOS 109 10/31/2023    BILITOT 0.8 10/31/2023     Past Medical History:   Diagnosis Date    ADHD (attention deficit hyperactivity disorder)     Alcoholism      Past Surgical History:   Procedure Laterality Date    AUGMENTATION OF BREAST      SINUS SURGERY       Current Outpatient Medications   Medication Sig    clindamycin (CLEOCIN T) 1 % lotion Apply topically 2 (two) times daily.    cloNIDine (CATAPRES) 0.1 MG tablet Take 1 tab by mouth each evening as needed for sleep    cloNIDine (CATAPRES) 0.2 MG tablet Take 1 tablet (0.2 mg total) by mouth nightly.    [START ON 11/29/2023] dextroamphetamine-amphetamine (ADDERALL XR) 30 MG 24 hr capsule Take 1 capsule (30 mg total) by mouth every morning.    [START ON 12/29/2023] dextroamphetamine-amphetamine (ADDERALL XR) 30 MG 24 hr capsule Take 1 capsule (30 mg total) by mouth every morning.    [START ON 1/28/2024] dextroamphetamine-amphetamine (ADDERALL XR) 30 MG 24 hr capsule Take 1 capsule (30 mg total) by mouth every morning.    folic acid (FOLVITE) 1 MG tablet Take 1 tablet (1 mg total) by mouth once daily.    furosemide (LASIX) 20 MG tablet Take 1 tablet (20 mg total) by mouth once daily.    gabapentin (NEURONTIN) 600 MG tablet Take 1 tablet (600 mg total) by mouth 3 (three) times daily.    loratadine (CLARITIN) 10 mg tablet Take 10 mg by mouth daily as needed for Allergies.    magnesium oxide 400 mg magnesium Tab Take 1 tablet by mouth Daily.     mometasone 0.1% (ELOCON) 0.1 % cream Apply topically once daily.    potassium chloride (MICRO-K) 10 MEQ CpSR Take 1 capsule (10 mEq total) by mouth once daily. for 365 doses    spironolactone (ALDACTONE) 50 MG tablet Take 1 tablet (50 mg total) by mouth once daily.    traMADoL (ULTRAM) 50 mg tablet Take 1 tablet (50 mg total) by mouth every 24 hours as needed for Pain.    traZODone (DESYREL) 100 MG tablet Take 1 tablet (100 mg total) by mouth nightly as needed for Insomnia.    UNABLE TO FIND medication name: Mometasone Furoate Solution apply a small amount to scalp twice a day as needed     No current facility-administered medications for this visit.       Objective:      Physical Exam    NOT DONE-VIDEO VISIT  Assessment:       1. Alcoholic cirrhosis of liver with ascites          Plan:   She has decompensated cirrhosis due to alcohol and tells me that she stopped drinking completely in May 2023.     She is on very low dose diuretics and has not required paracentesis since Aug 2023.  She reports some very mild ankle edema and is still on low dose diuretics.    She apears to be improving.  She is now committed to lifelong abstinence but has not yet had any formal rehab.    - MRI ressuring- no obvious HCC    1) Labs in 6 months incl AFP  2) Repeat MRI in 6 months.  3) EGD ordered to check for varices  4) Reiterated the importance of not eating raw oysters  5) advised HAV/HBV vaccination    Clinic in 6 months.

## 2023-12-06 ENCOUNTER — PATIENT MESSAGE (OUTPATIENT)
Dept: PRIMARY CARE CLINIC | Facility: CLINIC | Age: 58
End: 2023-12-06
Payer: MEDICARE

## 2023-12-06 DIAGNOSIS — G62.1 ALCOHOL-INDUCED POLYNEUROPATHY: ICD-10-CM

## 2023-12-06 DIAGNOSIS — G62.1 ALCOHOLIC PERIPHERAL NEUROPATHY: Primary | ICD-10-CM

## 2023-12-06 DIAGNOSIS — R20.2 PARESTHESIAS: ICD-10-CM

## 2023-12-06 RX ORDER — GABAPENTIN 600 MG/1
600 TABLET ORAL 3 TIMES DAILY
Qty: 90 TABLET | Refills: 2 | Status: SHIPPED | OUTPATIENT
Start: 2023-12-06

## 2023-12-06 RX ORDER — TRAMADOL HYDROCHLORIDE 50 MG/1
50 TABLET ORAL
Qty: 30 TABLET | Refills: 1 | Status: SHIPPED | OUTPATIENT
Start: 2023-12-06

## 2023-12-12 ENCOUNTER — TELEPHONE (OUTPATIENT)
Dept: ENDOSCOPY | Facility: HOSPITAL | Age: 58
End: 2023-12-12
Payer: MEDICARE

## 2023-12-13 ENCOUNTER — TELEPHONE (OUTPATIENT)
Dept: ENDOSCOPY | Facility: HOSPITAL | Age: 58
End: 2023-12-13
Payer: MEDICARE

## 2023-12-15 ENCOUNTER — TELEPHONE (OUTPATIENT)
Dept: NEUROLOGY | Facility: CLINIC | Age: 58
End: 2023-12-15
Payer: MEDICARE

## 2023-12-15 NOTE — TELEPHONE ENCOUNTER
1st attempt to contact the patient to offer an appointment with a neurologists per. referral, no answer, left vm

## 2024-01-11 ENCOUNTER — PATIENT MESSAGE (OUTPATIENT)
Dept: HEPATOLOGY | Facility: CLINIC | Age: 59
End: 2024-01-11
Payer: COMMERCIAL

## 2024-01-12 ENCOUNTER — PATIENT MESSAGE (OUTPATIENT)
Dept: PSYCHIATRY | Facility: CLINIC | Age: 59
End: 2024-01-12
Payer: COMMERCIAL

## 2024-01-24 ENCOUNTER — PATIENT MESSAGE (OUTPATIENT)
Dept: PSYCHIATRY | Facility: CLINIC | Age: 59
End: 2024-01-24

## 2024-01-24 ENCOUNTER — OFFICE VISIT (OUTPATIENT)
Dept: PSYCHIATRY | Facility: CLINIC | Age: 59
End: 2024-01-24
Payer: COMMERCIAL

## 2024-01-24 DIAGNOSIS — F90.9 ATTENTION DEFICIT HYPERACTIVITY DISORDER (ADHD), UNSPECIFIED ADHD TYPE: Primary | ICD-10-CM

## 2024-01-24 DIAGNOSIS — G47.00 INSOMNIA, UNSPECIFIED TYPE: ICD-10-CM

## 2024-01-24 PROCEDURE — 99214 OFFICE O/P EST MOD 30 MIN: CPT | Mod: 95,,, | Performed by: NURSE PRACTITIONER

## 2024-01-24 RX ORDER — TRAZODONE HYDROCHLORIDE 100 MG/1
100 TABLET ORAL NIGHTLY PRN
Qty: 30 TABLET | Refills: 11 | Status: SHIPPED | OUTPATIENT
Start: 2024-01-24 | End: 2024-05-23

## 2024-01-24 RX ORDER — DEXTROAMPHETAMINE SACCHARATE, AMPHETAMINE ASPARTATE MONOHYDRATE, DEXTROAMPHETAMINE SULFATE AND AMPHETAMINE SULFATE 5; 5; 5; 5 MG/1; MG/1; MG/1; MG/1
40 CAPSULE, EXTENDED RELEASE ORAL EVERY MORNING
Qty: 60 CAPSULE | Refills: 0 | Status: SHIPPED | OUTPATIENT
Start: 2024-01-24 | End: 2024-03-12 | Stop reason: SDUPTHER

## 2024-01-24 RX ORDER — DEXTROAMPHETAMINE SACCHARATE, AMPHETAMINE ASPARTATE MONOHYDRATE, DEXTROAMPHETAMINE SULFATE AND AMPHETAMINE SULFATE 5; 5; 5; 5 MG/1; MG/1; MG/1; MG/1
40 CAPSULE, EXTENDED RELEASE ORAL EVERY MORNING
Qty: 60 CAPSULE | Refills: 0 | Status: SHIPPED | OUTPATIENT
Start: 2024-02-23 | End: 2024-05-23 | Stop reason: SDUPTHER

## 2024-01-24 RX ORDER — DEXTROAMPHETAMINE SACCHARATE, AMPHETAMINE ASPARTATE MONOHYDRATE, DEXTROAMPHETAMINE SULFATE AND AMPHETAMINE SULFATE 5; 5; 5; 5 MG/1; MG/1; MG/1; MG/1
40 CAPSULE, EXTENDED RELEASE ORAL EVERY MORNING
Qty: 60 CAPSULE | Refills: 0 | Status: SHIPPED | OUTPATIENT
Start: 2024-03-22 | End: 2024-04-25 | Stop reason: SDUPTHER

## 2024-01-24 NOTE — PROGRESS NOTES
Outpatient Psychiatry Follow-Up Visit (MD/NP)    1/24/2024    Clinical Status of Patient:  Outpatient (Ambulatory)    Chief Complaint:  Claire Casper is a 58 y.o. female who presents today for follow-up of attention problems.  Met with patient.      Last visit was:  11/23. Chart and  reviewed.   The patient location is: [facility]   The chief complaint leading to consultation is: attention problems    Visit type: audiovisual    Face to Face time with patient: 30 minutes  35 minutes of total time spent on the encounter, which includes face to face time and non-face to face time preparing to see the patient (eg, review of tests), Obtaining and/or reviewing separately obtained history, Documenting clinical information in the electronic or other health record, Independently interpreting results (not separately reported) and communicating results to the patient/family/caregiver, or Care coordination (not separately reported).     Each patient to whom he or she provides medical services by telemedicine is:  (1) informed of the relationship between the physician and patient and the respective role of any other health care provider with respect to management of the patient; and (2) notified that he or she may decline to receive medical services by telemedicine and may withdraw from such care at any time.    Interval History and Content of Current Session:  Current Psychiatric Medications/changes  DC Vyvanse  (national shortage)  Adderall 10 mg 3 x daily  Increase to Clonidine 0.2 mg po q hs for insomnia -       Virtual Visit:  Pt present bright affect and euthymic mood. Pt reports good response to Adderall XR for ADHD sx for less effective is afternoon. Will increase to 40 mg.  Reports Trazodone has given her nightmares but will alternate with Clonidine.  Denies SI/HI/AVH.     Psychotherapy:  Target symptoms: distractability  Why chosen therapy is appropriate versus another modality: relevant to  diagnosis  Outcome monitoring methods: self-report  Therapeutic intervention type: insight oriented psychotherapy  Topics discussed/themes: building skills sets for symptom management, symptom recognition  The patient's response to the intervention is accepting. The patient's progress toward treatment goals is good.   Duration of intervention: 12 minutes.    Review of Systems   PSYCHIATRIC: Pertinant items are noted in the narrative.  CONSTITUTIONAL: No weight gain or loss.   MUSCULOSKELETAL: No pain or stiffness of the joints.  NEUROLOGIC: No weakness, sensory changes, seizures, confusion, memory loss, tremor or other abnormal movements.  ENDOCRINE: No polydipsia or polyuria.  INTEGUMENTARY: No rashes or lacerations.  EYES: No exophthalmos, jaundice or blindness.  ENT: No dizziness, tinnitus or hearing loss.  RESPIRATORY: No shortness of breath.  CARDIOVASCULAR: No tachycardia or chest pain.  GASTROINTESTINAL: No nausea, vomiting, pain, constipation or diarrhea.  GENITOURINARY: No frequency, dysuria or sexual dysfunction.  HEMATOLOGIC/LYMPHATIC: No excessive bleeding, prolonged or excessive bleeding after dental extraction/injury.  ALLERGIC/IMMUNOLOGIC: No allergic response to materials, foods or animals at this time.    Past Medical, Family and Social History: The patient's past medical, family and social history have been reviewed and updated as appropriate within the electronic medical record - see encounter notes.    Compliance: no    Side effects: None    Risk Parameters:  Patient reports no suicidal ideation  Patient reports no homicidal ideation  Patient reports no self-injurious behavior  Patient reports no violent behavior    Exam (detailed: at least 9 elements; comprehensive: all 15 elements)   Constitutional  Vitals:  Most recent vital signs, dated greater than 90 days prior to this appointment, were reviewed.   There were no vitals filed for this visit.     General:  unremarkable, age appropriate      Musculoskeletal  Muscle Strength/Tone:  no tremor, no tic   Gait & Station:  non-ataxic     Psychiatric  Speech:  no latency; no press   Mood & Affect:  steady  congruent and appropriate   Thought Process:  normal and logical   Associations:  intact   Thought Content:  normal, no suicidality, no homicidality, delusions, or paranoia   Insight:  intact   Judgement: behavior is adequate to circumstances   Orientation:  grossly intact   Memory: intact for content of interview   Language: grossly intact   Attention Span & Concentration:  able to focus   Fund of Knowledge:  intact and appropriate to age and level of education     Assessment and Diagnosis   Status/Progress: Based on the examination today, the patient's problem(s) is/are improved and well controlled.  New problems have not been presented today.   Co-morbidities and Lack of compliance are not complicating management of the primary condition.  There are no active rule-out diagnoses for this patient at this time.     General Impression:       ICD-10-CM ICD-9-CM   1. Attention deficit hyperactivity disorder (ADHD), unspecified ADHD type  F90.9 314.01   2. Insomnia, unspecified type  G47.00 780.52       Intervention/Counseling/Treatment Plan   Medication Management: Continue current medications. The risks and benefits of medication were discussed with the patient.  Increase to Adderall XR 40 mg daily (20 mg x 2)  Trazodone 100 mg before bed as needed for insomnia  Hold Clonidine 0.2 mg po q hs for insomnia -     Return to Clinic: 3 months    Risks, benefits, side effects and alternative treatments discussed with patient. Patient agrees with the current plan as documented.  Encouraged Patient to keep future appointments.  Take medications as prescribed and abstain from substance abuse.  Pt to present to ED for thoughts to harm herself or others

## 2024-02-16 ENCOUNTER — TELEPHONE (OUTPATIENT)
Dept: ENDOSCOPY | Facility: HOSPITAL | Age: 59
End: 2024-02-16
Payer: COMMERCIAL

## 2024-02-20 ENCOUNTER — TELEPHONE (OUTPATIENT)
Dept: ENDOSCOPY | Facility: HOSPITAL | Age: 59
End: 2024-02-20
Payer: COMMERCIAL

## 2024-02-20 NOTE — TELEPHONE ENCOUNTER
Dear Referring Provider and Staff,    The referral to Endoscopy for patient with MRN [id number] has  will be canceled. Endoscopy Scheduling Department made several attempts to reach the patient for scheduling . If the referring provider would like for the patient to receive endoscopic care, please confirm with the patient whether they'd like to proceed and submit a new referral to Endoscopy if they are.        Thank you,      Endoscopy Scheduling Department

## 2024-02-20 NOTE — TELEPHONE ENCOUNTER
Endoscopy Scheduling Department  [facility] [address] [place] [address]  Take the [facility] Elevators to 4th Floor Endoscopy Lab      Date: 2/20/24      Medical Record # [id number]      Dear  kylee atkins    An order for the following procedure(s) Upper Endoscopy (EGD) was placed for you by   Sanjeev gaona.    Since multiple attempts have been made to get in touch with you, this is the last notification. Please call the scheduling nurse to schedule this procedure as soon as possible.    If you have already scheduled this appointment, please disregard this letter. If you would like to cancel this request, please call the number listed below.     Sincerely,        Endoscopy Scheduling Department [phone]        Comments: Office hours are Monday through Friday 8-430p.

## 2024-03-12 DIAGNOSIS — F90.9 ATTENTION DEFICIT HYPERACTIVITY DISORDER (ADHD), UNSPECIFIED ADHD TYPE: ICD-10-CM

## 2024-03-12 RX ORDER — DEXTROAMPHETAMINE SACCHARATE, AMPHETAMINE ASPARTATE MONOHYDRATE, DEXTROAMPHETAMINE SULFATE AND AMPHETAMINE SULFATE 5; 5; 5; 5 MG/1; MG/1; MG/1; MG/1
40 CAPSULE, EXTENDED RELEASE ORAL EVERY MORNING
Qty: 60 CAPSULE | Refills: 0 | Status: SHIPPED | OUTPATIENT
Start: 2024-03-12 | End: 2024-05-23 | Stop reason: SDUPTHER

## 2024-04-25 DIAGNOSIS — E87.6 HYPOKALEMIA: ICD-10-CM

## 2024-04-25 DIAGNOSIS — F90.9 ATTENTION DEFICIT HYPERACTIVITY DISORDER (ADHD), UNSPECIFIED ADHD TYPE: ICD-10-CM

## 2024-04-25 DIAGNOSIS — E83.42 HYPOMAGNESEMIA: ICD-10-CM

## 2024-04-25 DIAGNOSIS — K70.31 ALCOHOLIC CIRRHOSIS OF LIVER WITH ASCITES: ICD-10-CM

## 2024-04-25 RX ORDER — POTASSIUM CHLORIDE 750 MG/1
10 CAPSULE, EXTENDED RELEASE ORAL DAILY
Qty: 90 CAPSULE | Refills: 3 | Status: SHIPPED | OUTPATIENT
Start: 2024-04-25 | End: 2025-04-25

## 2024-04-25 RX ORDER — CALCIUM CARBONATE 300MG(750)
1 TABLET,CHEWABLE ORAL DAILY
Qty: 90 TABLET | Refills: 3 | Status: SHIPPED | OUTPATIENT
Start: 2024-04-25

## 2024-04-25 RX ORDER — FOLIC ACID 1 MG/1
1 TABLET ORAL DAILY
Qty: 90 TABLET | Refills: 3 | Status: SHIPPED | OUTPATIENT
Start: 2024-04-25

## 2024-04-26 RX ORDER — DEXTROAMPHETAMINE SACCHARATE, AMPHETAMINE ASPARTATE MONOHYDRATE, DEXTROAMPHETAMINE SULFATE AND AMPHETAMINE SULFATE 5; 5; 5; 5 MG/1; MG/1; MG/1; MG/1
40 CAPSULE, EXTENDED RELEASE ORAL EVERY MORNING
Qty: 60 CAPSULE | Refills: 0 | Status: SHIPPED | OUTPATIENT
Start: 2024-04-26 | End: 2024-05-23 | Stop reason: SDUPTHER

## 2024-04-30 DIAGNOSIS — K70.31 ALCOHOLIC CIRRHOSIS OF LIVER WITH ASCITES: ICD-10-CM

## 2024-04-30 RX ORDER — FUROSEMIDE 20 MG/1
20 TABLET ORAL DAILY
Qty: 90 TABLET | Refills: 3 | Status: SHIPPED | OUTPATIENT
Start: 2024-04-30 | End: 2025-04-30

## 2024-05-01 ENCOUNTER — PATIENT MESSAGE (OUTPATIENT)
Dept: HEPATOLOGY | Facility: CLINIC | Age: 59
End: 2024-05-01
Payer: COMMERCIAL

## 2024-05-22 ENCOUNTER — PATIENT MESSAGE (OUTPATIENT)
Dept: PSYCHIATRY | Facility: CLINIC | Age: 59
End: 2024-05-22
Payer: COMMERCIAL

## 2024-05-23 ENCOUNTER — OFFICE VISIT (OUTPATIENT)
Dept: PSYCHIATRY | Facility: CLINIC | Age: 59
End: 2024-05-23
Payer: COMMERCIAL

## 2024-05-23 DIAGNOSIS — G47.00 INSOMNIA, UNSPECIFIED TYPE: ICD-10-CM

## 2024-05-23 DIAGNOSIS — F90.9 ATTENTION DEFICIT HYPERACTIVITY DISORDER (ADHD), UNSPECIFIED ADHD TYPE: Primary | ICD-10-CM

## 2024-05-23 PROCEDURE — 99214 OFFICE O/P EST MOD 30 MIN: CPT | Mod: 95,,, | Performed by: NURSE PRACTITIONER

## 2024-05-23 RX ORDER — DEXTROAMPHETAMINE SACCHARATE, AMPHETAMINE ASPARTATE MONOHYDRATE, DEXTROAMPHETAMINE SULFATE AND AMPHETAMINE SULFATE 5; 5; 5; 5 MG/1; MG/1; MG/1; MG/1
40 CAPSULE, EXTENDED RELEASE ORAL EVERY MORNING
Qty: 60 CAPSULE | Refills: 0 | Status: SHIPPED | OUTPATIENT
Start: 2024-05-23 | End: 2024-06-12 | Stop reason: SDUPTHER

## 2024-05-23 RX ORDER — DEXTROAMPHETAMINE SACCHARATE, AMPHETAMINE ASPARTATE MONOHYDRATE, DEXTROAMPHETAMINE SULFATE AND AMPHETAMINE SULFATE 5; 5; 5; 5 MG/1; MG/1; MG/1; MG/1
40 CAPSULE, EXTENDED RELEASE ORAL EVERY MORNING
Qty: 60 CAPSULE | Refills: 0 | Status: SHIPPED | OUTPATIENT
Start: 2024-07-21 | End: 2024-06-12 | Stop reason: SDUPTHER

## 2024-05-23 RX ORDER — CLONIDINE HYDROCHLORIDE 0.2 MG/1
0.2 TABLET ORAL NIGHTLY
Qty: 90 TABLET | Refills: 3 | Status: SHIPPED | OUTPATIENT
Start: 2024-05-23 | End: 2024-06-12 | Stop reason: SDUPTHER

## 2024-05-23 RX ORDER — DEXTROAMPHETAMINE SACCHARATE, AMPHETAMINE ASPARTATE MONOHYDRATE, DEXTROAMPHETAMINE SULFATE AND AMPHETAMINE SULFATE 5; 5; 5; 5 MG/1; MG/1; MG/1; MG/1
40 CAPSULE, EXTENDED RELEASE ORAL EVERY MORNING
Qty: 60 CAPSULE | Refills: 0 | Status: SHIPPED | OUTPATIENT
Start: 2024-06-22 | End: 2024-06-12 | Stop reason: SDUPTHER

## 2024-05-23 NOTE — PROGRESS NOTES
Outpatient Psychiatry Follow-Up Visit (MD/NP)    5/23/2024    Clinical Status of Patient:  Outpatient (Ambulatory)    Chief Complaint:  Claire Casper is a 58 y.o. female who presents today for follow-up of attention problems.  Met with patient.      Last visit was:  11/23. Chart and  reviewed.   The patient location is: [facility]   The chief complaint leading to consultation is: attention problems    Visit type: audiovisual    Face to Face time with patient: 30 minutes  35 minutes of total time spent on the encounter, which includes face to face time and non-face to face time preparing to see the patient (eg, review of tests), Obtaining and/or reviewing separately obtained history, Documenting clinical information in the electronic or other health record, Independently interpreting results (not separately reported) and communicating results to the patient/family/caregiver, or Care coordination (not separately reported).     Each patient to whom he or she provides medical services by telemedicine is:  (1) informed of the relationship between the physician and patient and the respective role of any other health care provider with respect to management of the patient; and (2) notified that he or she may decline to receive medical services by telemedicine and may withdraw from such care at any time.    Interval History and Content of Current Session:  Current Psychiatric Medications/changes  Increase to Adderall XR 40 mg daily (20 mg x 2)  Trazodone 100 mg before bed as needed for insomnia  Hold Clonidine 0.2 mg po q hs for insomnia -     Virtual Visit:  Pt present bright affect and euthymic mood. Pt reports good response to Adderall XR for ADHD sx for less effective is afternoon. Will increase to 40 mg.  Reports Trazodone has given her nightmares but will alternate with Clonidine.  Denies SI/HI/AVH.     Psychotherapy:  Target symptoms: distractability  Why chosen therapy is appropriate versus  another modality: relevant to diagnosis  Outcome monitoring methods: self-report  Therapeutic intervention type: insight oriented psychotherapy  Topics discussed/themes: building skills sets for symptom management, symptom recognition  The patient's response to the intervention is accepting. The patient's progress toward treatment goals is good.   Duration of intervention: 12 minutes.    Review of Systems   PSYCHIATRIC: Pertinant items are noted in the narrative.  CONSTITUTIONAL: No weight gain or loss.   MUSCULOSKELETAL: No pain or stiffness of the joints.  NEUROLOGIC: No weakness, sensory changes, seizures, confusion, memory loss, tremor or other abnormal movements.  ENDOCRINE: No polydipsia or polyuria.  INTEGUMENTARY: No rashes or lacerations.  EYES: No exophthalmos, jaundice or blindness.  ENT: No dizziness, tinnitus or hearing loss.  RESPIRATORY: No shortness of breath.  CARDIOVASCULAR: No tachycardia or chest pain.  GASTROINTESTINAL: No nausea, vomiting, pain, constipation or diarrhea.  GENITOURINARY: No frequency, dysuria or sexual dysfunction.  HEMATOLOGIC/LYMPHATIC: No excessive bleeding, prolonged or excessive bleeding after dental extraction/injury.  ALLERGIC/IMMUNOLOGIC: No allergic response to materials, foods or animals at this time.    Past Medical, Family and Social History: The patient's past medical, family and social history have been reviewed and updated as appropriate within the electronic medical record - see encounter notes.    Compliance: no    Side effects: None    Risk Parameters:  Patient reports no suicidal ideation  Patient reports no homicidal ideation  Patient reports no self-injurious behavior  Patient reports no violent behavior    Exam (detailed: at least 9 elements; comprehensive: all 15 elements)   Constitutional  Vitals:  Most recent vital signs, dated greater than 90 days prior to this appointment, were reviewed.   There were no vitals filed for this visit.     General:   unremarkable, age appropriate     Musculoskeletal  Muscle Strength/Tone:  no tremor, no tic   Gait & Station:  non-ataxic     Psychiatric  Speech:  no latency; no press   Mood & Affect:  steady  congruent and appropriate   Thought Process:  normal and logical   Associations:  intact   Thought Content:  normal, no suicidality, no homicidality, delusions, or paranoia   Insight:  intact   Judgement: behavior is adequate to circumstances   Orientation:  grossly intact   Memory: intact for content of interview   Language: grossly intact   Attention Span & Concentration:  able to focus   Fund of Knowledge:  intact and appropriate to age and level of education     Assessment and Diagnosis   Status/Progress: Based on the examination today, the patient's problem(s) is/are improved and well controlled.  New problems have not been presented today.   Co-morbidities and Lack of compliance are not complicating management of the primary condition.  There are no active rule-out diagnoses for this patient at this time.     General Impression:       ICD-10-CM ICD-9-CM   1. Attention deficit hyperactivity disorder (ADHD), unspecified ADHD type  F90.9 314.01   2. Insomnia, unspecified type  G47.00 780.52         Intervention/Counseling/Treatment Plan   Medication Management: Continue current medications. The risks and benefits of medication were discussed with the patient.  Adderall XR 40 mg daily (20 mg x 2)  Trazodone 100 mg before bed as needed for insomnia   Clonidine 0.2 mg po q hs for insomnia -     Return to Clinic: 3 months    Risks, benefits, side effects and alternative treatments discussed with patient. Patient agrees with the current plan as documented.  Encouraged Patient to keep future appointments.  Take medications as prescribed and abstain from substance abuse.  Pt to present to ED for thoughts to harm herself or others

## 2024-06-12 ENCOUNTER — PATIENT MESSAGE (OUTPATIENT)
Dept: PSYCHIATRY | Facility: CLINIC | Age: 59
End: 2024-06-12
Payer: COMMERCIAL

## 2024-06-12 DIAGNOSIS — G47.00 INSOMNIA, UNSPECIFIED TYPE: ICD-10-CM

## 2024-06-12 DIAGNOSIS — F90.9 ATTENTION DEFICIT HYPERACTIVITY DISORDER (ADHD), UNSPECIFIED ADHD TYPE: ICD-10-CM

## 2024-06-12 RX ORDER — DEXTROAMPHETAMINE SACCHARATE, AMPHETAMINE ASPARTATE MONOHYDRATE, DEXTROAMPHETAMINE SULFATE AND AMPHETAMINE SULFATE 5; 5; 5; 5 MG/1; MG/1; MG/1; MG/1
40 CAPSULE, EXTENDED RELEASE ORAL EVERY MORNING
Qty: 60 CAPSULE | Refills: 0 | Status: SHIPPED | OUTPATIENT
Start: 2024-08-10

## 2024-06-12 RX ORDER — DEXTROAMPHETAMINE SACCHARATE, AMPHETAMINE ASPARTATE MONOHYDRATE, DEXTROAMPHETAMINE SULFATE AND AMPHETAMINE SULFATE 5; 5; 5; 5 MG/1; MG/1; MG/1; MG/1
40 CAPSULE, EXTENDED RELEASE ORAL EVERY MORNING
Qty: 60 CAPSULE | Refills: 0 | Status: SHIPPED | OUTPATIENT
Start: 2024-06-12

## 2024-06-12 RX ORDER — CLONIDINE HYDROCHLORIDE 0.2 MG/1
0.2 TABLET ORAL NIGHTLY
Qty: 90 TABLET | Refills: 3 | Status: SHIPPED | OUTPATIENT
Start: 2024-06-12 | End: 2025-06-12

## 2024-06-12 RX ORDER — DEXTROAMPHETAMINE SACCHARATE, AMPHETAMINE ASPARTATE MONOHYDRATE, DEXTROAMPHETAMINE SULFATE AND AMPHETAMINE SULFATE 5; 5; 5; 5 MG/1; MG/1; MG/1; MG/1
40 CAPSULE, EXTENDED RELEASE ORAL EVERY MORNING
Qty: 60 CAPSULE | Refills: 0 | Status: SHIPPED | OUTPATIENT
Start: 2024-07-11

## 2024-06-18 NOTE — TELEPHONE ENCOUNTER
----- Message from Mercy Correa sent at 7/5/2023 11:18 AM CDT -----  Type:  Needs Medical Advice    Who Called: pt    Would the patient rather a call back or a response via [facility]? call  Best Call Back Number: [phone]  Additional Information: pt requesting a call back to find out if your procedure can be moved to [place] would like approval from doctor before changing        English

## 2024-10-19 DIAGNOSIS — F90.9 ATTENTION DEFICIT HYPERACTIVITY DISORDER (ADHD), UNSPECIFIED ADHD TYPE: ICD-10-CM

## 2024-10-20 RX ORDER — DEXTROAMPHETAMINE SACCHARATE, AMPHETAMINE ASPARTATE MONOHYDRATE, DEXTROAMPHETAMINE SULFATE AND AMPHETAMINE SULFATE 5; 5; 5; 5 MG/1; MG/1; MG/1; MG/1
40 CAPSULE, EXTENDED RELEASE ORAL EVERY MORNING
Qty: 60 CAPSULE | Refills: 0 | Status: SHIPPED | OUTPATIENT
Start: 2024-10-20

## 2024-12-02 DIAGNOSIS — F90.9 ATTENTION DEFICIT HYPERACTIVITY DISORDER (ADHD), UNSPECIFIED ADHD TYPE: ICD-10-CM

## 2024-12-02 RX ORDER — DEXTROAMPHETAMINE SACCHARATE, AMPHETAMINE ASPARTATE MONOHYDRATE, DEXTROAMPHETAMINE SULFATE AND AMPHETAMINE SULFATE 5; 5; 5; 5 MG/1; MG/1; MG/1; MG/1
40 CAPSULE, EXTENDED RELEASE ORAL EVERY MORNING
Qty: 60 CAPSULE | Refills: 0 | Status: SHIPPED | OUTPATIENT
Start: 2024-12-02 | End: 2024-12-03 | Stop reason: SDUPTHER

## 2024-12-03 ENCOUNTER — OFFICE VISIT (OUTPATIENT)
Dept: PSYCHIATRY | Facility: CLINIC | Age: 59
End: 2024-12-03
Payer: COMMERCIAL

## 2024-12-03 DIAGNOSIS — F90.9 ATTENTION DEFICIT HYPERACTIVITY DISORDER (ADHD), UNSPECIFIED ADHD TYPE: ICD-10-CM

## 2024-12-03 DIAGNOSIS — G47.00 INSOMNIA, UNSPECIFIED TYPE: ICD-10-CM

## 2024-12-03 PROCEDURE — 1159F MED LIST DOCD IN RCRD: CPT | Mod: CPTII,95,, | Performed by: NURSE PRACTITIONER

## 2024-12-03 PROCEDURE — 99214 OFFICE O/P EST MOD 30 MIN: CPT | Mod: 95,,, | Performed by: NURSE PRACTITIONER

## 2024-12-03 PROCEDURE — 1160F RVW MEDS BY RX/DR IN RCRD: CPT | Mod: CPTII,95,, | Performed by: NURSE PRACTITIONER

## 2024-12-03 RX ORDER — DEXTROAMPHETAMINE SACCHARATE, AMPHETAMINE ASPARTATE MONOHYDRATE, DEXTROAMPHETAMINE SULFATE AND AMPHETAMINE SULFATE 5; 5; 5; 5 MG/1; MG/1; MG/1; MG/1
40 CAPSULE, EXTENDED RELEASE ORAL EVERY MORNING
Qty: 60 CAPSULE | Refills: 0 | Status: SHIPPED | OUTPATIENT
Start: 2025-02-01

## 2024-12-03 RX ORDER — DEXTROAMPHETAMINE SACCHARATE, AMPHETAMINE ASPARTATE MONOHYDRATE, DEXTROAMPHETAMINE SULFATE AND AMPHETAMINE SULFATE 5; 5; 5; 5 MG/1; MG/1; MG/1; MG/1
40 CAPSULE, EXTENDED RELEASE ORAL EVERY MORNING
Qty: 60 CAPSULE | Refills: 0 | Status: SHIPPED | OUTPATIENT
Start: 2025-01-02

## 2024-12-03 RX ORDER — CLONIDINE HYDROCHLORIDE 0.2 MG/1
0.2 TABLET ORAL NIGHTLY
Qty: 90 TABLET | Refills: 3 | Status: SHIPPED | OUTPATIENT
Start: 2024-12-03 | End: 2025-12-03

## 2024-12-03 RX ORDER — DEXTROAMPHETAMINE SACCHARATE, AMPHETAMINE ASPARTATE MONOHYDRATE, DEXTROAMPHETAMINE SULFATE AND AMPHETAMINE SULFATE 5; 5; 5; 5 MG/1; MG/1; MG/1; MG/1
40 CAPSULE, EXTENDED RELEASE ORAL EVERY MORNING
Qty: 60 CAPSULE | Refills: 0 | Status: SHIPPED | OUTPATIENT
Start: 2024-12-03

## 2024-12-03 NOTE — PROGRESS NOTES
Outpatient Psychiatry Follow-Up Visit (MD/NP)    12/3/2024    Clinical Status of Patient:  Outpatient (Ambulatory)    Chief Complaint:  Claire Casper is a 59 y.o. female who presents today for follow-up of attention problems.  Met with patient.      Last visit was:  11/23. Chart and  reviewed.   The patient location is: [facility]   The chief complaint leading to consultation is: attention problems    Visit type: audiovisual    Face to Face time with patient: 30 minutes  35 minutes of total time spent on the encounter, which includes face to face time and non-face to face time preparing to see the patient (eg, review of tests), Obtaining and/or reviewing separately obtained history, Documenting clinical information in the electronic or other health record, Independently interpreting results (not separately reported) and communicating results to the patient/family/caregiver, or Care coordination (not separately reported).     Each patient to whom he or she provides medical services by telemedicine is:  (1) informed of the relationship between the physician and patient and the respective role of any other health care provider with respect to management of the patient; and (2) notified that he or she may decline to receive medical services by telemedicine and may withdraw from such care at any time.    Interval History and Content of Current Session:  Current Psychiatric Medications/changes  Increase to Adderall XR 40 mg daily (20 mg x 2)  Trazodone 100 mg before bed as needed for insomnia  Hold Clonidine 0.2 mg po q hs for insomnia -     Virtual Visit:  Pt present bright affect and euthymic mood. Pt reports good response to increase in Adderall XR for ADHD sx.Reports Sleep regular; takes Clonidine.  Denies SI/HI/AVH.     Psychotherapy:  Target symptoms: distractability  Why chosen therapy is appropriate versus another modality: relevant to diagnosis  Outcome monitoring methods:  self-report  Therapeutic intervention type: insight oriented psychotherapy  Topics discussed/themes: building skills sets for symptom management, symptom recognition  The patient's response to the intervention is accepting. The patient's progress toward treatment goals is good.   Duration of intervention: 12 minutes.    Review of Systems   PSYCHIATRIC: Pertinant items are noted in the narrative.  CONSTITUTIONAL: No weight gain or loss.   MUSCULOSKELETAL: No pain or stiffness of the joints.  NEUROLOGIC: No weakness, sensory changes, seizures, confusion, memory loss, tremor or other abnormal movements.  ENDOCRINE: No polydipsia or polyuria.  INTEGUMENTARY: No rashes or lacerations.  EYES: No exophthalmos, jaundice or blindness.  ENT: No dizziness, tinnitus or hearing loss.  RESPIRATORY: No shortness of breath.  CARDIOVASCULAR: No tachycardia or chest pain.  GASTROINTESTINAL: No nausea, vomiting, pain, constipation or diarrhea.  GENITOURINARY: No frequency, dysuria or sexual dysfunction.  HEMATOLOGIC/LYMPHATIC: No excessive bleeding, prolonged or excessive bleeding after dental extraction/injury.  ALLERGIC/IMMUNOLOGIC: No allergic response to materials, foods or animals at this time.    Past Medical, Family and Social History: The patient's past medical, family and social history have been reviewed and updated as appropriate within the electronic medical record - see encounter notes.    Compliance: no    Side effects: None    Risk Parameters:  Patient reports no suicidal ideation  Patient reports no homicidal ideation  Patient reports no self-injurious behavior  Patient reports no violent behavior    Exam (detailed: at least 9 elements; comprehensive: all 15 elements)   Constitutional  Vitals:  Most recent vital signs, dated greater than 90 days prior to this appointment, were reviewed.   There were no vitals filed for this visit.     General:  unremarkable, age appropriate     Musculoskeletal  Muscle Strength/Tone:   no tremor, no tic   Gait & Station:  non-ataxic     Psychiatric  Speech:  no latency; no press   Mood & Affect:  steady  congruent and appropriate   Thought Process:  normal and logical   Associations:  intact   Thought Content:  normal, no suicidality, no homicidality, delusions, or paranoia   Insight:  intact   Judgement: behavior is adequate to circumstances   Orientation:  grossly intact   Memory: intact for content of interview   Language: grossly intact   Attention Span & Concentration:  able to focus   Fund of Knowledge:  intact and appropriate to age and level of education     Assessment and Diagnosis   Status/Progress: Based on the examination today, the patient's problem(s) is/are improved and well controlled.  New problems have not been presented today.   Co-morbidities and Lack of compliance are not complicating management of the primary condition.  There are no active rule-out diagnoses for this patient at this time.     General Impression:       ICD-10-CM ICD-9-CM   1. Attention deficit hyperactivity disorder (ADHD), unspecified ADHD type  F90.9 314.01   2. Insomnia, unspecified type  G47.00 780.52       Intervention/Counseling/Treatment Plan   Medication Management: Continue current medications. The risks and benefits of medication were discussed with the patient.  Adderall XR 40 mg daily (20 mg x 2)   Clonidine 0.2 mg po q hs for insomnia -     Return to Clinic: 3 months    Risks, benefits, side effects and alternative treatments discussed with patient. Patient agrees with the current plan as documented.  Encouraged Patient to keep future appointments.  Take medications as prescribed and abstain from substance abuse.  Pt to present to ED for thoughts to harm herself or others

## 2024-12-13 ENCOUNTER — HOSPITAL ENCOUNTER (OUTPATIENT)
Dept: RADIOLOGY | Facility: OTHER | Age: 59
Discharge: HOME | End: 2024-12-13
Attending: STUDENT IN AN ORGANIZED HEALTH CARE EDUCATION/TRAINING PROGRAM
Payer: COMMERCIAL

## 2024-12-13 DIAGNOSIS — Z12.31 ENCOUNTER FOR SCREENING MAMMOGRAM FOR BREAST CANCER: ICD-10-CM

## 2024-12-13 PROCEDURE — 77067 SCR MAMMO BI INCL CAD: CPT | Mod: 26,,, | Performed by: RADIOLOGY

## 2024-12-13 PROCEDURE — 77063 BREAST TOMOSYNTHESIS BI: CPT | Mod: 26,,, | Performed by: RADIOLOGY

## 2024-12-13 PROCEDURE — 77063 BREAST TOMOSYNTHESIS BI: CPT | Mod: TC

## 2025-02-04 DIAGNOSIS — F90.9 ATTENTION DEFICIT HYPERACTIVITY DISORDER (ADHD), UNSPECIFIED ADHD TYPE: ICD-10-CM

## 2025-02-04 RX ORDER — DEXTROAMPHETAMINE SACCHARATE, AMPHETAMINE ASPARTATE MONOHYDRATE, DEXTROAMPHETAMINE SULFATE AND AMPHETAMINE SULFATE 5; 5; 5; 5 MG/1; MG/1; MG/1; MG/1
40 CAPSULE, EXTENDED RELEASE ORAL EVERY MORNING
Qty: 60 CAPSULE | Refills: 0 | Status: SHIPPED | OUTPATIENT
Start: 2025-02-04

## 2025-04-27 DIAGNOSIS — F90.9 ATTENTION DEFICIT HYPERACTIVITY DISORDER (ADHD), UNSPECIFIED ADHD TYPE: ICD-10-CM

## 2025-04-27 RX ORDER — DEXTROAMPHETAMINE SACCHARATE, AMPHETAMINE ASPARTATE MONOHYDRATE, DEXTROAMPHETAMINE SULFATE AND AMPHETAMINE SULFATE 5; 5; 5; 5 MG/1; MG/1; MG/1; MG/1
40 CAPSULE, EXTENDED RELEASE ORAL EVERY MORNING
Qty: 60 CAPSULE | Refills: 0 | Status: SHIPPED | OUTPATIENT
Start: 2025-04-27

## 2025-07-01 DIAGNOSIS — G47.00 INSOMNIA, UNSPECIFIED TYPE: ICD-10-CM

## 2025-07-01 RX ORDER — CLONIDINE HYDROCHLORIDE 0.2 MG/1
0.2 TABLET ORAL NIGHTLY
Qty: 90 TABLET | Refills: 3 | Status: SHIPPED | OUTPATIENT
Start: 2025-07-01 | End: 2026-07-01

## 2025-07-07 DIAGNOSIS — F90.9 ATTENTION DEFICIT HYPERACTIVITY DISORDER (ADHD), UNSPECIFIED ADHD TYPE: ICD-10-CM

## 2025-07-07 RX ORDER — DEXTROAMPHETAMINE SACCHARATE, AMPHETAMINE ASPARTATE MONOHYDRATE, DEXTROAMPHETAMINE SULFATE AND AMPHETAMINE SULFATE 5; 5; 5; 5 MG/1; MG/1; MG/1; MG/1
40 CAPSULE, EXTENDED RELEASE ORAL EVERY MORNING
Qty: 60 CAPSULE | Refills: 0 | Status: SHIPPED | OUTPATIENT
Start: 2025-07-07

## 2025-08-14 ENCOUNTER — OFFICE VISIT (OUTPATIENT)
Dept: PSYCHIATRY | Facility: CLINIC | Age: 60
End: 2025-08-14
Payer: COMMERCIAL

## 2025-08-14 DIAGNOSIS — G47.00 INSOMNIA, UNSPECIFIED TYPE: ICD-10-CM

## 2025-08-14 DIAGNOSIS — F90.9 ATTENTION DEFICIT HYPERACTIVITY DISORDER (ADHD), UNSPECIFIED ADHD TYPE: ICD-10-CM

## 2025-08-14 PROCEDURE — 98006 SYNCH AUDIO-VIDEO EST MOD 30: CPT | Mod: 95,,, | Performed by: NURSE PRACTITIONER

## 2025-08-14 RX ORDER — DEXTROAMPHETAMINE SACCHARATE, AMPHETAMINE ASPARTATE MONOHYDRATE, DEXTROAMPHETAMINE SULFATE AND AMPHETAMINE SULFATE 5; 5; 5; 5 MG/1; MG/1; MG/1; MG/1
40 CAPSULE, EXTENDED RELEASE ORAL EVERY MORNING
Qty: 60 CAPSULE | Refills: 0 | Status: SHIPPED | OUTPATIENT
Start: 2025-10-11

## 2025-08-14 RX ORDER — CLONIDINE HYDROCHLORIDE 0.2 MG/1
0.2 TABLET ORAL NIGHTLY
Qty: 90 TABLET | Refills: 3 | Status: SHIPPED | OUTPATIENT
Start: 2025-08-14 | End: 2026-08-14

## 2025-08-14 RX ORDER — DEXTROAMPHETAMINE SACCHARATE, AMPHETAMINE ASPARTATE MONOHYDRATE, DEXTROAMPHETAMINE SULFATE AND AMPHETAMINE SULFATE 5; 5; 5; 5 MG/1; MG/1; MG/1; MG/1
40 CAPSULE, EXTENDED RELEASE ORAL EVERY MORNING
Qty: 60 CAPSULE | Refills: 0 | Status: SHIPPED | OUTPATIENT
Start: 2025-08-14

## 2025-08-14 RX ORDER — DEXTROAMPHETAMINE SACCHARATE, AMPHETAMINE ASPARTATE MONOHYDRATE, DEXTROAMPHETAMINE SULFATE AND AMPHETAMINE SULFATE 5; 5; 5; 5 MG/1; MG/1; MG/1; MG/1
40 CAPSULE, EXTENDED RELEASE ORAL EVERY MORNING
Qty: 60 CAPSULE | Refills: 0 | Status: SHIPPED | OUTPATIENT
Start: 2025-09-12